# Patient Record
Sex: FEMALE | Race: WHITE | NOT HISPANIC OR LATINO | Employment: UNEMPLOYED | URBAN - METROPOLITAN AREA
[De-identification: names, ages, dates, MRNs, and addresses within clinical notes are randomized per-mention and may not be internally consistent; named-entity substitution may affect disease eponyms.]

---

## 2017-05-22 ENCOUNTER — APPOINTMENT (OUTPATIENT)
Dept: MEDICAL GROUP | Facility: PHYSICIAN GROUP | Age: 52
End: 2017-05-22
Payer: COMMERCIAL

## 2017-08-14 ENCOUNTER — HOSPITAL ENCOUNTER (OUTPATIENT)
Dept: LAB | Facility: MEDICAL CENTER | Age: 52
End: 2017-08-14
Attending: INTERNAL MEDICINE
Payer: COMMERCIAL

## 2017-08-14 DIAGNOSIS — E78.5 DYSLIPIDEMIA: ICD-10-CM

## 2017-08-14 PROCEDURE — 83695 ASSAY OF LIPOPROTEIN(A): CPT

## 2017-08-14 PROCEDURE — 36415 COLL VENOUS BLD VENIPUNCTURE: CPT

## 2017-08-15 LAB — LPA SERPL-MCNC: 78 MG/DL

## 2017-08-16 ENCOUNTER — OFFICE VISIT (OUTPATIENT)
Dept: MEDICAL GROUP | Facility: PHYSICIAN GROUP | Age: 52
End: 2017-08-16
Payer: COMMERCIAL

## 2017-08-16 VITALS
TEMPERATURE: 97.5 F | WEIGHT: 289 LBS | RESPIRATION RATE: 18 BRPM | HEART RATE: 101 BPM | SYSTOLIC BLOOD PRESSURE: 136 MMHG | BODY MASS INDEX: 49.34 KG/M2 | HEIGHT: 64 IN | DIASTOLIC BLOOD PRESSURE: 90 MMHG | OXYGEN SATURATION: 96 %

## 2017-08-16 DIAGNOSIS — E78.5 DYSLIPIDEMIA: ICD-10-CM

## 2017-08-16 DIAGNOSIS — E66.01 MORBID OBESITY WITH BMI OF 45.0-49.9, ADULT (HCC): ICD-10-CM

## 2017-08-16 DIAGNOSIS — E03.9 HYPOTHYROIDISM, UNSPECIFIED TYPE: ICD-10-CM

## 2017-08-16 DIAGNOSIS — E55.9 VITAMIN D DEFICIENCY: ICD-10-CM

## 2017-08-16 PROCEDURE — 99214 OFFICE O/P EST MOD 30 MIN: CPT | Performed by: INTERNAL MEDICINE

## 2017-08-16 NOTE — PROGRESS NOTES
"Subjective:  Antonella is a 52 y.o. female with the following   Past Medical History   Diagnosis Date   • Hypothyroidism    • Increased BMI    • Arthritis      FEET & knees   • Bronchitis 12-15-14     on antibiotic   • Pain 12-22-14     right leg, feet, 4/10      Family History   Problem Relation Age of Onset   • Cancer Mother      renal ca/ thyroid ca   • Heart Disease Father      The patient is on the following medications:   Current outpatient prescriptions:   •  levothyroxine (SYNTHROID) 175 MCG Tab, TAKE 1 TABLET BY MOUTH EVERY DAY, Disp: 90 Tab, Rfl: 3  •  Cholecalciferol (VITAMIN D3) 2000 UNIT CAPS, Take 4,000 Units by mouth every day., Disp: , Rfl:   •  cetirizine (ZYRTEC) 10 MG TABS, Take 10 mg by mouth every day., Disp: , Rfl:   •  Evening Primrose OIL, Take 1 Tab by mouth every morning., Disp: , Rfl:     HPI; patient is here today for follow-up visit, currently on levothyroxine for hypothyroidism denies having cold intolerance or constipation, on vitamin D supplements for vitamin D deficiency denies having back pain. Patient has history of morbid obesity, noncompliant with diet and exercise denies having chest pain or shortness of breath.  ROS:  See HPI    Blood pressure 136/90, pulse 101, temperature 36.4 °C (97.5 °F), resp. rate 18, height 1.626 m (5' 4.02\"), weight 131.09 kg (289 lb), SpO2 96 %.on RA  Objective:  Patient is well appearing and in no acute distress.  Pharynx is clear.  Neck is soft and supple with no cervical or supraclavicular lymphadenopathy, thyromegaly or masses, no JVD.  Lungs clear to auscultation bilaterally with normal respiratory effort. Abdomen soft, non-tender on palpation,not distended. Heart regular rate and rhythm without murmur. Extremities without any clubbing, cyanosis, or edema.    Assessment and Plan:  1. Hypothyroidism; status post thyroidectomy, on levothyroxine 175 mcg q. Daily.       2. increased BMI ; noncompliant with diet and exercise.      3. vitamin D deficiency ; " currently on vitamin D 3 -4000 units supplements.            4. Foot osteoarthritis; per patient responds to OTC nonsteroidal anti-inflammatory as needed.        5. Dyslipidemia;    Ref. Range 2/1/2014 07:57 10/7/2016 11:20 8/14/2017 08:06   Cholesterol,Tot Latest Ref Range: 100-199 mg/dL 125     Triglycerides Latest Ref Range: 0-149 mg/dL 106     HDL Latest Ref Range: >=40 mg/dL 32 (A)     LDL Latest Ref Range: <100 mg/dL 72     Lipoprotein A Latest Ref Range: <=29 mg/dL  88 (H) 78 (H)         Patient is advised on preventive and supportive care, diet and lifestyle modification, daily walking ,exercise and weight loss, alternative therapies for elevated lipoprotein A, monitor labs.                                  Please note that this dictation was created using voice recognition software. I have worked with consultants from the vendor as well as technical experts from [facility] [employer] to optimize the interface. I have made every reasonable attempt to correct obvious errors, but I expect that there are errors of grammar and possibly content that I did not discover before finalizing the note.

## 2017-08-16 NOTE — MR AVS SNAPSHOT
"        Antonella Frausto   2017 9:20 AM   Office Visit   MRN: [id number]    Department:  [facility]   Dept Phone:  [phone]    Description:  Female : 1965   Provider:  Alexa Torres M.D.           Reason for Visit     Follow-Up           Allergies as of 2017     Allergen Noted Reactions    Asa [Aspirin] 2010   Vomiting, Nausea    Codeine 2010   Vomiting, Nausea    Latex 2014       \"irritates my skin\"      You were diagnosed with     Morbid obesity with BMI of 45.0-49.9, adult (CMS-HCC)   [828827]       Hypothyroidism, unspecified type   [1143937]       Dyslipidemia   [068239]       Vitamin D deficiency   [0251167]         Vital Signs     Blood Pressure Pulse Temperature Respirations Height Weight    136/90 mmHg 101 36.4 °C (97.5 °F) 18 1.626 m (5' 4.02\") 131.09 kg (289 lb)    Body Mass Index Oxygen Saturation Smoking Status             49.58 kg/m2 96% Never Smoker          Basic Information     Date Of Birth Sex Race Ethnicity Preferred Language    1965 Female White Non- English      Problem List              ICD-10-CM Priority Class Noted - Resolved    Hypothyroidism E03.9   2012 - Present    Vitamin d deficiency    2012 - Present    Increased BMI R63.8   2012 - Present    Low HDL (under 40) E78.6   2012 - Present    Osteoarthritis of foot M19.079   7/10/2013 - Present    Other and unspecified derangement of medial meniscus M23.305   2014 - Present    Morbid obesity with BMI of 45.0-49.9, adult ([facility]) E66.01, Z68.42   2017 - Present    Vitamin D deficiency E55.9   2017 - Present      Health Maintenance        Date Due Completion Dates    IMM DTaP/Tdap/Td Vaccine (1 - Tdap) 1982 ---    COLONOSCOPY 2013 ---    MAMMOGRAM 2015, 9/10/2012, 2010, 2010    PAP SMEAR 2017    IMM INFLUENZA (1) 2017 ---            Current Immunizations     No immunizations on file.      Below and/or " attached are the medications your provider expects you to take. Review all of your home medications and newly ordered medications with your provider and/or pharmacist. Follow medication instructions as directed by your provider and/or pharmacist. Please keep your medication list with you and share with your provider. Update the information when medications are discontinued, doses are changed, or new medications (including over-the-counter products) are added; and carry medication information at all times in the event of emergency situations     Allergies:  ASA - Vomiting,Nausea     CODEINE - Vomiting,Nausea     LATEX - (reactions not documented)               Medications  Valid as of: August 16, 2017 -  9:54 AM    Generic Name Brand Name Tablet Size Instructions for use    Cetirizine HCl (Tab) ZYRTEC 10 MG Take 10 mg by mouth every day.        Cholecalciferol (Cap) Vitamin D3 2000 UNIT Take 4,000 Units by mouth every day.        Evening Primrose (Oil) Evening Primrose  Take 1 Tab by mouth every morning.        Levothyroxine Sodium (Tab) SYNTHROID 175 MCG TAKE 1 TABLET BY MOUTH EVERY DAY        .                 Medicines prescribed today were sent to:     [facility] [address] [place] [address] [address] AT [facility] [employer] On license of [facility]. & [address]    [address] [place] [address]    Phone: [phone] Fax: [phone]    Open 24 Hours?: No      Medication refill instructions:       If your prescription bottle indicates you have medication refills left, it is not necessary to call your provider’s office. Please contact your pharmacy and they will refill your medication.    If your prescription bottle indicates you do not have any refills left, you may request refills at any time through one of the following ways: The online hi5 system (except Urgent Care), by calling your provider’s office, or by asking your pharmacy to contact your provider’s office with a refill request. Medication refills are  processed only during regular business hours and may not be available until the next business day. Your provider may request additional information or to have a follow-up visit with you prior to refilling your medication.   *Please Note: Medication refills are assigned a new Rx number when refilled electronically. Your pharmacy may indicate that no refills were authorized even though a new prescription for the same medication is available at the pharmacy. Please request the medicine by name with the pharmacy before contacting your provider for a refill.        Your To Do List     Future Labs/Procedures Complete By Expires    LIPOPROTEIN A  As directed 8/16/2018    VITAMIN D,25 HYDROXY  As directed 8/16/2018         [employer] Access Code: Activation code not generated  Current [employer] Status: Active

## 2017-10-16 DIAGNOSIS — E03.9 HYPOTHYROIDISM: ICD-10-CM

## 2017-10-16 RX ORDER — LEVOTHYROXINE SODIUM 175 UG/1
TABLET ORAL
Qty: 90 TAB | Refills: 0 | Status: SHIPPED | OUTPATIENT
Start: 2017-10-16 | End: 2018-02-16

## 2017-10-16 NOTE — TELEPHONE ENCOUNTER
Was the patient seen in the last year in this department? Yes     Does patient have an active prescription for medications requested? NO     Received Request Via: Pharmacy

## 2018-02-12 ENCOUNTER — APPOINTMENT (OUTPATIENT)
Dept: MEDICAL GROUP | Facility: PHYSICIAN GROUP | Age: 53
End: 2018-02-12
Payer: COMMERCIAL

## 2018-02-16 ENCOUNTER — OFFICE VISIT (OUTPATIENT)
Dept: MEDICAL GROUP | Facility: PHYSICIAN GROUP | Age: 53
End: 2018-02-16
Payer: COMMERCIAL

## 2018-02-16 VITALS
HEIGHT: 64 IN | WEIGHT: 293 LBS | DIASTOLIC BLOOD PRESSURE: 90 MMHG | SYSTOLIC BLOOD PRESSURE: 116 MMHG | HEART RATE: 82 BPM | BODY MASS INDEX: 50.02 KG/M2 | RESPIRATION RATE: 16 BRPM | OXYGEN SATURATION: 96 % | TEMPERATURE: 97.7 F

## 2018-02-16 DIAGNOSIS — E66.01 MORBID OBESITY WITH BMI OF 50.0-59.9, ADULT (HCC): ICD-10-CM

## 2018-02-16 DIAGNOSIS — E89.0 POSTOPERATIVE HYPOTHYROIDISM: ICD-10-CM

## 2018-02-16 DIAGNOSIS — E55.9 VITAMIN D DEFICIENCY: ICD-10-CM

## 2018-02-16 PROCEDURE — 99214 OFFICE O/P EST MOD 30 MIN: CPT | Performed by: NURSE PRACTITIONER

## 2018-02-16 RX ORDER — LEVOTHYROXINE SODIUM 175 UG/1
175 TABLET ORAL
Qty: 90 TAB | Refills: 0 | Status: SHIPPED | OUTPATIENT
Start: 2018-02-16 | End: 2018-04-23 | Stop reason: SDUPTHER

## 2018-02-16 ASSESSMENT — PAIN SCALES - GENERAL: PAINLEVEL: NO PAIN

## 2018-02-16 ASSESSMENT — PATIENT HEALTH QUESTIONNAIRE - PHQ9: CLINICAL INTERPRETATION OF PHQ2 SCORE: 0

## 2018-02-16 NOTE — ASSESSMENT & PLAN NOTE
"Chronic in nature. Stable per patient. Patient states that her last TSH was \"recently\". Last recorded TSH was completed in 2016 repeat lab is ordered for patient at this time. Labwork due. Denies palpitations, skin changes, temperature intolerance, changes in bowel habits.    "

## 2018-02-16 NOTE — ASSESSMENT & PLAN NOTE
Chronic in nature. Stable. Discussed with patient that she should talk to her new provider regarding this issue, continue supplementation at this time.

## 2018-02-26 ENCOUNTER — HOSPITAL ENCOUNTER (OUTPATIENT)
Dept: RADIOLOGY | Facility: MEDICAL CENTER | Age: 53
End: 2018-02-26
Attending: FAMILY MEDICINE
Payer: COMMERCIAL

## 2018-02-26 DIAGNOSIS — Z12.31 VISIT FOR SCREENING MAMMOGRAM: ICD-10-CM

## 2018-02-26 PROCEDURE — 77067 SCR MAMMO BI INCL CAD: CPT

## 2018-04-19 ENCOUNTER — HOSPITAL ENCOUNTER (OUTPATIENT)
Dept: LAB | Facility: MEDICAL CENTER | Age: 53
End: 2018-04-19
Attending: NURSE PRACTITIONER
Payer: COMMERCIAL

## 2018-04-19 LAB
T4 FREE SERPL-MCNC: 0.97 NG/DL (ref 0.53–1.43)
TSH SERPL DL<=0.005 MIU/L-ACNC: 3.03 UIU/ML (ref 0.38–5.33)

## 2018-04-19 PROCEDURE — 84439 ASSAY OF FREE THYROXINE: CPT

## 2018-04-19 PROCEDURE — 36415 COLL VENOUS BLD VENIPUNCTURE: CPT

## 2018-04-19 PROCEDURE — 84443 ASSAY THYROID STIM HORMONE: CPT

## 2018-04-23 ENCOUNTER — OFFICE VISIT (OUTPATIENT)
Dept: MEDICAL GROUP | Facility: PHYSICIAN GROUP | Age: 53
End: 2018-04-23
Payer: COMMERCIAL

## 2018-04-23 VITALS
BODY MASS INDEX: 50.02 KG/M2 | OXYGEN SATURATION: 97 % | WEIGHT: 293 LBS | HEIGHT: 64 IN | TEMPERATURE: 97.5 F | DIASTOLIC BLOOD PRESSURE: 84 MMHG | HEART RATE: 85 BPM | SYSTOLIC BLOOD PRESSURE: 124 MMHG

## 2018-04-23 DIAGNOSIS — Z00.00 HEALTHCARE MAINTENANCE: ICD-10-CM

## 2018-04-23 DIAGNOSIS — E55.9 VITAMIN D DEFICIENCY: ICD-10-CM

## 2018-04-23 DIAGNOSIS — Z12.11 SCREEN FOR COLON CANCER: ICD-10-CM

## 2018-04-23 DIAGNOSIS — E89.0 POSTOPERATIVE HYPOTHYROIDISM: ICD-10-CM

## 2018-04-23 DIAGNOSIS — J30.89 CHRONIC NON-SEASONAL ALLERGIC RHINITIS, UNSPECIFIED TRIGGER: ICD-10-CM

## 2018-04-23 PROBLEM — J30.9 CHRONIC ALLERGIC RHINITIS: Status: ACTIVE | Noted: 2018-04-23

## 2018-04-23 PROCEDURE — 99214 OFFICE O/P EST MOD 30 MIN: CPT | Performed by: FAMILY MEDICINE

## 2018-04-23 RX ORDER — LEVOTHYROXINE SODIUM 175 UG/1
175 TABLET ORAL
Qty: 90 TAB | Refills: 3 | Status: SHIPPED | OUTPATIENT
Start: 2018-04-23 | End: 2019-07-30 | Stop reason: SDUPTHER

## 2018-04-23 ASSESSMENT — PAIN SCALES - GENERAL: PAINLEVEL: NO PAIN

## 2018-04-23 NOTE — ASSESSMENT & PLAN NOTE
Patient is s/p thyroidectomy several years back.She is currently on levothyroxine 175 mcg daily.Currently asymptomatic.Recent TSH was normal.

## 2018-04-23 NOTE — PROGRESS NOTES
"Subjective:   Antonella Frausto is a 52 y.o. female here today for establishing care and discuss chronic problems as below :    HPI :    Chronic allergic rhinitis  Patient manages by taking Zyrtec 10 mg daily.Symptoms are generally well controlled.    Hypothyroidism  Patient is s/p thyroidectomy several years back.She is currently on levothyroxine 175 mcg daily.Currently asymptomatic.Recent TSH was normal.    Vitamin D deficiency  Takes Vitamin D 4000 units daily.Last Vitamin D level on chart normal.       Current medicines (including changes today)  Current Outpatient Prescriptions   Medication Sig Dispense Refill   • levothyroxine (SYNTHROID) 175 MCG Tab Take 1 Tab by mouth every day. 90 Tab 3   • Cholecalciferol (VITAMIN D3) 2000 UNIT CAPS Take 4,000 Units by mouth every day.     • cetirizine (ZYRTEC) 10 MG TABS Take 10 mg by mouth every day.     • Evening Primrose OIL Take 1 Tab by mouth every morning.       No current facility-administered medications for this visit.      She  has a past medical history of Arthritis; Bronchitis (12-15-14); Hypothyroidism; Increased BMI; and Pain (12-22-14).    ROS   No chest pain, no shortness of breath, no abdominal pain       Objective:     Blood pressure 124/84, pulse 85, temperature 36.4 °C (97.5 °F), height 1.626 m (5' 4\"), weight (!) 135.2 kg (298 lb), SpO2 97 %. Body mass index is 51.15 kg/m².     PHYSICAL EXAM     GEN: Alert and oriented,well appearing, no acute distress  SKIN: warm, dry to touch, no rashes or lesions in visible areas  PSYCH: mood and affect normal, judgement normal  EYES: Conjunctiva clear, lids normal,pupils equal round and reactive  ENMT: Normal external nose and ears,EACs normal appearing, TM pearly gray with normal light reflex bilaterally,nasal mucosa and turbinates normal appearing without erythema or edema, lips without lesions,good dentition,oropharynx clear  Neck : Trachea midline, no masses or swelling  LYMPHATIC : No cervical or " supraclavicular lymphadenopathy  RESPIRATORY : Unlabored respiratory effort, no distress noted, clear to auscultation bilaterally, no wheeze, rhonchi, crackles  CARDIOVASCULAR: RRR, S1 S2 normal, no murmurs,no edema of the extremities  MUSCULOSKELETAL : Normal gait and stance, no obvious abnormalities   NEURO: No overt focal neurologic deficits,sensation intact        Assessment and Plan:   The following treatment plan was discussed    1. Postoperative hypothyroidism  New problem to me.Continue levothyroxine at current dose.Last TSH normal.Labs can be repeated in 1 year  - levothyroxine (SYNTHROID) 175 MCG Tab; Take 1 Tab by mouth every day.  Dispense: 90 Tab; Refill: 3    2. Chronic non-seasonal allergic rhinitis, unspecified trigger  Chronic.Well controlled on daily Zyrtec.    3. Vitamin D deficiency  Continue Vitamin D 4000 units daily.Lab ordered for checking Vitamin D level    4. Healthcare maintenance  - LIPID PROFILE; Future  - COMP METABOLIC PANEL; Future  - HEMOGLOBIN A1C; Future    5. Screen for colon cancer  - REFERRAL TO GI FOR COLONOSCOPY      Followup: Return in about 6 weeks (around 6/4/2018) for Pap, GYN Exam.         Please note that this dictation was created using voice recognition software. I have made every reasonable attempt to correct obvious errors, but I expect that there are errors of grammar and possibly content that I did not discover before finalizing the note.

## 2018-05-14 DIAGNOSIS — E89.0 POSTOPERATIVE HYPOTHYROIDISM: ICD-10-CM

## 2018-05-14 RX ORDER — LEVOTHYROXINE SODIUM 175 UG/1
175 TABLET ORAL
Qty: 90 TAB | Refills: 0 | OUTPATIENT
Start: 2018-05-14

## 2018-05-14 RX ORDER — LEVOTHYROXINE SODIUM 175 UG/1
TABLET ORAL
Refills: 0 | OUTPATIENT
Start: 2018-05-14

## 2018-05-23 ENCOUNTER — HOSPITAL ENCOUNTER (OUTPATIENT)
Dept: LAB | Facility: MEDICAL CENTER | Age: 53
End: 2018-05-23
Attending: FAMILY MEDICINE
Payer: COMMERCIAL

## 2018-05-23 DIAGNOSIS — E55.9 VITAMIN D DEFICIENCY: ICD-10-CM

## 2018-05-23 DIAGNOSIS — Z00.00 HEALTHCARE MAINTENANCE: ICD-10-CM

## 2018-05-23 LAB
25(OH)D3 SERPL-MCNC: 29 NG/ML (ref 30–100)
ALBUMIN SERPL BCP-MCNC: 3.7 G/DL (ref 3.2–4.9)
ALBUMIN/GLOB SERPL: 1.2 G/DL
ALP SERPL-CCNC: 73 U/L (ref 30–99)
ALT SERPL-CCNC: 16 U/L (ref 2–50)
ANION GAP SERPL CALC-SCNC: 9 MMOL/L (ref 0–11.9)
AST SERPL-CCNC: 14 U/L (ref 12–45)
BILIRUB SERPL-MCNC: 0.3 MG/DL (ref 0.1–1.5)
BUN SERPL-MCNC: 18 MG/DL (ref 8–22)
CALCIUM SERPL-MCNC: 9.1 MG/DL (ref 8.5–10.5)
CHLORIDE SERPL-SCNC: 106 MMOL/L (ref 96–112)
CHOLEST SERPL-MCNC: 148 MG/DL (ref 100–199)
CO2 SERPL-SCNC: 25 MMOL/L (ref 20–33)
CREAT SERPL-MCNC: 0.7 MG/DL (ref 0.5–1.4)
EST. AVERAGE GLUCOSE BLD GHB EST-MCNC: 123 MG/DL
GLOBULIN SER CALC-MCNC: 3 G/DL (ref 1.9–3.5)
GLUCOSE SERPL-MCNC: 106 MG/DL (ref 65–99)
HBA1C MFR BLD: 5.9 % (ref 0–5.6)
HDLC SERPL-MCNC: 37 MG/DL
LDLC SERPL CALC-MCNC: 93 MG/DL
POTASSIUM SERPL-SCNC: 4.3 MMOL/L (ref 3.6–5.5)
PROT SERPL-MCNC: 6.7 G/DL (ref 6–8.2)
SODIUM SERPL-SCNC: 140 MMOL/L (ref 135–145)
TRIGL SERPL-MCNC: 92 MG/DL (ref 0–149)

## 2018-05-23 PROCEDURE — 83036 HEMOGLOBIN GLYCOSYLATED A1C: CPT

## 2018-05-23 PROCEDURE — 80061 LIPID PANEL: CPT

## 2018-05-23 PROCEDURE — 80053 COMPREHEN METABOLIC PANEL: CPT

## 2018-05-23 PROCEDURE — 82306 VITAMIN D 25 HYDROXY: CPT

## 2018-05-23 PROCEDURE — 36415 COLL VENOUS BLD VENIPUNCTURE: CPT

## 2018-05-24 ENCOUNTER — OFFICE VISIT (OUTPATIENT)
Dept: MEDICAL GROUP | Facility: PHYSICIAN GROUP | Age: 53
End: 2018-05-24
Payer: COMMERCIAL

## 2018-05-24 ENCOUNTER — HOSPITAL ENCOUNTER (OUTPATIENT)
Facility: MEDICAL CENTER | Age: 53
End: 2018-05-24
Attending: FAMILY MEDICINE
Payer: COMMERCIAL

## 2018-05-24 VITALS
DIASTOLIC BLOOD PRESSURE: 76 MMHG | OXYGEN SATURATION: 98 % | SYSTOLIC BLOOD PRESSURE: 122 MMHG | BODY MASS INDEX: 48.32 KG/M2 | WEIGHT: 283 LBS | TEMPERATURE: 97.2 F | HEART RATE: 86 BPM | HEIGHT: 64 IN

## 2018-05-24 DIAGNOSIS — Z11.51 SCREENING FOR HPV (HUMAN PAPILLOMAVIRUS): ICD-10-CM

## 2018-05-24 DIAGNOSIS — Z01.419 ENCOUNTER FOR WELL WOMAN EXAM WITH ROUTINE GYNECOLOGICAL EXAM: ICD-10-CM

## 2018-05-24 DIAGNOSIS — Z12.4 CERVICAL CANCER SCREENING: ICD-10-CM

## 2018-05-24 PROCEDURE — 99396 PREV VISIT EST AGE 40-64: CPT | Performed by: FAMILY MEDICINE

## 2018-05-24 PROCEDURE — 88175 CYTOPATH C/V AUTO FLUID REDO: CPT

## 2018-05-24 PROCEDURE — 87624 HPV HI-RISK TYP POOLED RSLT: CPT

## 2018-05-24 ASSESSMENT — PAIN SCALES - GENERAL: PAINLEVEL: NO PAIN

## 2018-05-24 NOTE — PROGRESS NOTES
SUBJECTIVE: 52 y.o. female for annual routine gynecologic exam    Gynecological exam      Obstetric History       T3      L0     SAB0   TAB0   Ectopic0   Molar0   Multiple0   Live Births0       Last Pap: more than 3 years ago  History   Sexual Activity   • Sexual activity: Yes   • Partners: Male     Sexual history: currently sexually active   H/O Abnormal Pap no  She  reports that she has never smoked. She has never used smokeless tobacco.        Allergies: Asa [aspirin]; Codeine; and Latex     ROS:    Reports mild-moderate menopause symptoms of hot flashes (takes evening primrose oil), night sweats, sleep disruption, mood changes.Denies vaginal dryness.   No significant bloating/fluid retention, pelvic pain, or dyspareunia. No vaginal discharge   No breast tenderness, mass, nipple discharge, changes in size or contour, or abnormal cyclic discomfort.  No urinary tract symptoms, no incontinence, no polydipsia, polyuria,  No abdominal pain, change in bowel habits, black or bloody stools.    No unusual headaches, no visual changes, menstrual migraines   No prolonged cough. No dyspnea or chest pain on exertion.  No depression, labile mood, anxiety, libido changes, insomnia.  No temperature intolerance.  No new/concerning skin lesions, concerns.     Exercise: no regular exercise program  Preventive Care:mammogram completed, will schedule colonoscopy    Current medicines (including changes today)  Current Outpatient Prescriptions   Medication Sig Dispense Refill   • levothyroxine (SYNTHROID) 175 MCG Tab Take 1 Tab by mouth every day. 90 Tab 3   • Cholecalciferol (VITAMIN D3) 2000 UNIT CAPS Take 4,000 Units by mouth every day.     • cetirizine (ZYRTEC) 10 MG TABS Take 10 mg by mouth every day.     • Evening Primrose OIL Take 1 Tab by mouth every morning.       No current facility-administered medications for this visit.      She  has a past medical history of Arthritis; Bronchitis (12-15-14); Hypothyroidism;  "Increased BMI; and Pain (12-22-14).  She  has a past surgical history that includes cholecystectomy (1995); gyn surgery; thyroidectomy total (10/20/2010); node biopsy (10/20/2010); knee arthroscopy (12/29/2014); medial meniscectomy (12/29/2014); and chondroplasty (12/29/2014).     Family History:   Family History   Problem Relation Age of Onset   • Cancer Mother      Thyroid ca, lymphoma   • Heart Disease Father        OBJECTIVE:   /76   Pulse 86   Temp 36.2 °C (97.2 °F)   Ht 1.626 m (5' 4\")   Wt (!) 128.4 kg (283 lb)   SpO2 98%   BMI 48.58 kg/m²   Body mass index is 48.58 kg/m².    HEAD AND NECK:  Ears normal.  Throat, oral cavity and tongue normal.  Neck supple. No adenopathy or masses in the neck or supraclavicular regions.  No carotid bruits. No thyromegaly. NEURO: Cranial nerves are normal. DTR's normal and symmetric.  CHEST:  Clear, good air entry, no wheezes or rales. HEART:  Regular rate and rhythm.  S1 and S2 normal.   No edema or JVD. ABDOMEN:  Soft without tenderness, guarding, mass or organomegaly.  No CVA tenderness or inguinal adenopathy. EXTREMITIES:  Extremities, reflexes and peripheral pulses are normal. SKIN: color normal, vascularity normal, no edema, temperature normal   No rashes or suspicious skin lesions noted.     Breast Exam: Performed with instruction during examination. No axillary lymphadenopathy, no skin changes, no dominant masses. No nipple retraction  Pelvic Exam -  Normal external genitalia with no lesions. Vaginal Mucosa:  normal vaginal mucosa . Cervix with no visible lesions. No cervical motion tenderness. Uterus is normal sized with no masses. No adnexal tenderness or enlargement appreciated. Thin Prep Pap is obtained and specimen(s) sent to lab      <ASSESSMENT and PLAN>  1. Encounter for well woman exam with routine gynecological exam  THINPREP PAP WITH HPV   2. Cervical cancer screening  THINPREP PAP WITH HPV   3. Screening for HPV (human papillomavirus)  THINPREP " PAP WITH HPV       Discussed  mammography screening, adequate intake of calcium and vitamin D, diet and exercise   Follow-up in 1 years for next Gyn exam and 5 years for next Pap.   Next office visit for recheck of chronic medical conditions is due in  1 year      Please note that this dictation was created using voice recognition software. I have made every reasonable attempt to correct obvious errors, but I expect that there are errors of grammar and possibly content that I did not discover before finalizing the note.

## 2018-05-25 DIAGNOSIS — Z01.419 ENCOUNTER FOR WELL WOMAN EXAM WITH ROUTINE GYNECOLOGICAL EXAM: ICD-10-CM

## 2018-05-25 DIAGNOSIS — Z11.51 SCREENING FOR HPV (HUMAN PAPILLOMAVIRUS): ICD-10-CM

## 2018-05-25 DIAGNOSIS — Z12.4 CERVICAL CANCER SCREENING: ICD-10-CM

## 2018-05-26 LAB
CYTOLOGY REG CYTOL: NORMAL
HPV HR 12 DNA CVX QL NAA+PROBE: NEGATIVE
HPV16 DNA SPEC QL NAA+PROBE: NEGATIVE
HPV18 DNA SPEC QL NAA+PROBE: NEGATIVE
SPECIMEN SOURCE: NORMAL

## 2018-07-30 ENCOUNTER — HOSPITAL ENCOUNTER (OUTPATIENT)
Dept: RADIOLOGY | Facility: MEDICAL CENTER | Age: 53
End: 2018-07-30
Attending: FAMILY MEDICINE
Payer: COMMERCIAL

## 2018-07-30 ENCOUNTER — OFFICE VISIT (OUTPATIENT)
Dept: URGENT CARE | Facility: PHYSICIAN GROUP | Age: 53
End: 2018-07-30
Payer: COMMERCIAL

## 2018-07-30 VITALS
RESPIRATION RATE: 16 BRPM | BODY MASS INDEX: 50.02 KG/M2 | HEIGHT: 64 IN | TEMPERATURE: 96.9 F | HEART RATE: 108 BPM | WEIGHT: 293 LBS | DIASTOLIC BLOOD PRESSURE: 100 MMHG | OXYGEN SATURATION: 94 % | SYSTOLIC BLOOD PRESSURE: 136 MMHG

## 2018-07-30 DIAGNOSIS — I83.90 VARICOSE VEIN OF LEG: ICD-10-CM

## 2018-07-30 DIAGNOSIS — I83.819 VARICOSE VEINS WITH PAIN: ICD-10-CM

## 2018-07-30 DIAGNOSIS — I82.890 SUPERFICIAL VEIN THROMBOSIS: ICD-10-CM

## 2018-07-30 PROCEDURE — 93971 EXTREMITY STUDY: CPT | Mod: RT

## 2018-07-30 PROCEDURE — 99214 OFFICE O/P EST MOD 30 MIN: CPT | Performed by: FAMILY MEDICINE

## 2018-07-30 ASSESSMENT — ENCOUNTER SYMPTOMS
NUMBNESS: 0
WEAKNESS: 0
LEG PAIN: 1
FEVER: 0
JOINT SWELLING: 0

## 2018-07-30 ASSESSMENT — PAIN SCALES - GENERAL: PAINLEVEL: 2=MINIMAL-SLIGHT

## 2018-07-30 NOTE — PROGRESS NOTES
"Subjective:   Antonella Frausto is a 53 y.o. female who presents for Leg Pain (R leg x1day )        Leg Pain   This is a new problem. The current episode started today. The problem occurs constantly. The problem has been unchanged. Pertinent negatives include no fever, joint swelling, numbness or weakness.     Review of Systems   Constitutional: Negative for fever.   Musculoskeletal: Negative for joint swelling.   Neurological: Negative for weakness and numbness.     Allergies   Allergen Reactions   • Asa [Aspirin] Vomiting and Nausea   • Codeine Vomiting and Nausea   • Latex      \"irritates my skin\"      Objective:   /100   Pulse (!) 108   Temp 36.1 °C (96.9 °F)   Resp 16   Ht 1.626 m (5' 4\")   Wt (!) 135.6 kg (299 lb)   SpO2 94%   BMI 51.32 kg/m²   Physical Exam   Constitutional: She is oriented to person, place, and time. She appears well-developed and well-nourished. No distress.   HENT:   Head: Normocephalic and atraumatic.   Eyes: Pupils are equal, round, and reactive to light. Conjunctivae and EOM are normal.   Cardiovascular: Normal rate and regular rhythm.    No murmur heard.  Pulmonary/Chest: Effort normal and breath sounds normal. No respiratory distress.   Abdominal: Soft. She exhibits no distension. There is no tenderness.   Neurological: She is alert and oriented to person, place, and time. She has normal reflexes. No sensory deficit.   Skin: Skin is warm and dry.        Psychiatric: She has a normal mood and affect.         Assessment/Plan:   Assessment    1. Superficial vein thrombosis  - US-EXTREMITY VENOUS UNILATERAL-LOWER RIGHT; Future  - REFERRAL TO VASCULAR MEDICINE Reason for Referral? Established Vascular Disease    2. Varicose vein of leg  ASA daily  Differential diagnosis, natural history, supportive care, and indications for immediate follow-up discussed.  "

## 2018-08-02 ENCOUNTER — OFFICE VISIT (OUTPATIENT)
Dept: URGENT CARE | Facility: PHYSICIAN GROUP | Age: 53
End: 2018-08-02
Payer: COMMERCIAL

## 2018-08-02 VITALS
RESPIRATION RATE: 14 BRPM | HEIGHT: 64 IN | HEART RATE: 94 BPM | WEIGHT: 293 LBS | BODY MASS INDEX: 50.02 KG/M2 | TEMPERATURE: 97.7 F | SYSTOLIC BLOOD PRESSURE: 140 MMHG | OXYGEN SATURATION: 97 % | DIASTOLIC BLOOD PRESSURE: 100 MMHG

## 2018-08-02 DIAGNOSIS — E66.01 MORBID OBESITY WITH BMI OF 50.0-59.9, ADULT (HCC): ICD-10-CM

## 2018-08-02 DIAGNOSIS — I83.90 VARICOSE VEIN OF LEG: ICD-10-CM

## 2018-08-02 DIAGNOSIS — I82.890 SUPERFICIAL VEIN THROMBOSIS: ICD-10-CM

## 2018-08-02 PROCEDURE — 99213 OFFICE O/P EST LOW 20 MIN: CPT | Performed by: NURSE PRACTITIONER

## 2018-08-02 ASSESSMENT — ENCOUNTER SYMPTOMS
MYALGIAS: 0
JOINT SWELLING: 0
SHORTNESS OF BREATH: 0
NUMBNESS: 0
NAUSEA: 0
ARTHRALGIAS: 0
LEG SWELLING: 1
DIZZINESS: 0
WEAKNESS: 0
VOMITING: 0
CHILLS: 0
ANOREXIA: 0
DIAPHORESIS: 0
EYE PAIN: 0
FEVER: 0
SORE THROAT: 0

## 2018-08-02 NOTE — PROGRESS NOTES
"Subjective:   Antonella Frausto is a 53 y.o. female who presents for Leg Swelling (r leg clots increasing )  Patient returns to clinic today with concerns of her right leg swelling more since her visit on 7/30. Patient was seen and evaluated an ultrasound was completed indicating no DVT however superficial thrombosis was noted in the greater saphenous vein proximal to which was less than 5 cm. Patient does have a scheduled appointment August 14 with vascular however was concerned because she was developing more superficial thrombosis on her right leg. Patient has been taking a baby aspirin and is looking for reassurance that this is adequate treatment. Patient denies any leg cramping., Chest pain, shortness of breath.       Leg Swelling   This is a recurrent problem. The current episode started in the past 7 days. The problem occurs constantly. The problem has been unchanged. Pertinent negatives include no anorexia, arthralgias, chest pain, chills, diaphoresis, fever, joint swelling, myalgias, nausea, numbness, rash, sore throat, vomiting or weakness. Nothing aggravates the symptoms. Treatments tried: ASA. The treatment provided mild relief.     Review of Systems   Constitutional: Negative for chills, diaphoresis and fever.   HENT: Negative for sore throat.    Eyes: Negative for pain.   Respiratory: Negative for shortness of breath.    Cardiovascular: Negative for chest pain.   Gastrointestinal: Negative for anorexia, nausea and vomiting.   Genitourinary: Negative for hematuria.   Musculoskeletal: Negative for arthralgias, joint swelling and myalgias.   Skin: Negative for rash.   Neurological: Negative for dizziness, weakness and numbness.     Allergies   Allergen Reactions   • Asa [Aspirin] Vomiting and Nausea   • Codeine Vomiting and Nausea   • Latex      \"irritates my skin\"      Objective:   /100   Pulse 94   Temp 36.5 °C (97.7 °F)   Resp 14   Ht 1.626 m (5' 4\")   Wt (!) 135.6 kg (299 lb)   SpO2 97%  "  BMI 51.32 kg/m²   Physical Exam   Constitutional: She is oriented to person, place, and time. She appears well-developed and well-nourished. No distress.   HENT:   Head: Normocephalic and atraumatic.   Eyes: Pupils are equal, round, and reactive to light. Conjunctivae and EOM are normal.   Cardiovascular: Normal rate and regular rhythm.    No murmur heard.  Pulses:       Femoral pulses are 2+ on the right side, and 2+ on the left side.       Posterior tibial pulses are 2+ on the right side, and 2+ on the left side.   Pulmonary/Chest: Effort normal and breath sounds normal. No respiratory distress.   Abdominal: Soft. She exhibits no distension. There is no tenderness.   Musculoskeletal:        Right lower leg: She exhibits swelling.        Legs:  Neurological: She is alert and oriented to person, place, and time. She has normal reflexes. No sensory deficit.   Skin: Skin is warm and dry.   Psychiatric: She has a normal mood and affect.         Assessment/Plan:   Assessment    1. Superficial vein thrombosis  2. Varicose vein of leg  3. Morbid obesity with BMI of 50.0-59.9, adult (HCC)        No acute findings on physical exam. Reassured patient of superficial thrombosis of the right lower extremity. Advised patient to follow-up vascular surgery as scheduled. Continue taking baby aspirin. Did recommend wearing compression stockings on bilateral lower extremities and elevating lower extremities when at rest.    Patient's body mass index is 51.32 kg/m². Exercise and nutrition counseling were performed at this visit.      Patient given precautionary s/sx that mandate immediate follow up and evaluation in the ED. Advised of risks of not doing so.    DDX, Supportive care, and indications for immediate follow-up discussed with patient.    Instructed to return to clinic or nearest emergency department if we are not available for any change in condition, further concerns, or worsening of symptoms.    The patient demonstrated  a good understanding and agreed with the treatment plan.

## 2018-08-14 ENCOUNTER — OFFICE VISIT (OUTPATIENT)
Dept: VASCULAR LAB | Facility: MEDICAL CENTER | Age: 53
End: 2018-08-14
Attending: INTERNAL MEDICINE
Payer: COMMERCIAL

## 2018-08-14 ENCOUNTER — HOSPITAL ENCOUNTER (OUTPATIENT)
Dept: RADIOLOGY | Facility: MEDICAL CENTER | Age: 53
End: 2018-08-14
Attending: NURSE PRACTITIONER
Payer: COMMERCIAL

## 2018-08-14 VITALS
WEIGHT: 293 LBS | SYSTOLIC BLOOD PRESSURE: 125 MMHG | DIASTOLIC BLOOD PRESSURE: 73 MMHG | HEIGHT: 64 IN | HEART RATE: 103 BPM | BODY MASS INDEX: 50.02 KG/M2

## 2018-08-14 DIAGNOSIS — M79.604 PAIN OF RIGHT LOWER EXTREMITY: ICD-10-CM

## 2018-08-14 PROCEDURE — 99202 OFFICE O/P NEW SF 15 MIN: CPT | Performed by: NURSE PRACTITIONER

## 2018-08-14 PROCEDURE — 93971 EXTREMITY STUDY: CPT

## 2018-08-14 PROCEDURE — 99212 OFFICE O/P EST SF 10 MIN: CPT | Performed by: NURSE PRACTITIONER

## 2018-08-14 RX ORDER — ASPIRIN 81 MG/1
81 TABLET, CHEWABLE ORAL DAILY
COMMUNITY
End: 2018-08-20

## 2018-08-14 ASSESSMENT — ENCOUNTER SYMPTOMS
DIZZINESS: 0
CONSTIPATION: 1
MEMORY LOSS: 0
CHILLS: 0
COUGH: 0
SINUS PAIN: 0
SHORTNESS OF BREATH: 1
HEADACHES: 0
HALLUCINATIONS: 0
BLOOD IN STOOL: 0
SEIZURES: 0
TINGLING: 0
NECK PAIN: 0
ABDOMINAL PAIN: 0
DIARRHEA: 0
BLURRED VISION: 0
BACK PAIN: 0
DOUBLE VISION: 0
FEVER: 0
BRUISES/BLEEDS EASILY: 1
WEAKNESS: 0
PALPITATIONS: 0
FALLS: 0
DEPRESSION: 0
LOSS OF CONSCIOUSNESS: 0
TREMORS: 0

## 2018-08-14 NOTE — PROGRESS NOTES
VASCULAR ANTI-COAGULATION VISIT  Subjective:   Antonella Frausto is a 53 y.o. female who presents today 8/14/2018 for Rt leg superficial thrombosis  Chief Complaint   Patient presents with   • New Patient       HPI: Pt was seen by her PCP on 8/2 after a diagnosis of superficial vein thrombosis. Pt is taking ASA 81 mg. The clot is located in the right greater saphenous vein in the proximal calf region and is less than 5 CM in length. Last radiology report was on 7/31. Since that duplex, the pt has experienced increased swelling and pain and in a region proximal to the original site.  Family history is positive for thrombosis brother with unknown area of clot and mother with PE after chemo.      Social History   Substance Use Topics   • Smoking status: Never Smoker   • Smokeless tobacco: Never Used   • Alcohol use No     DIET AND EXERCISE:  Weight Change:None   Diet: common adult  Exercise: no regular exercise program     Review of Systems   Constitutional: Negative for chills, fever and malaise/fatigue.   HENT: Positive for tinnitus. Negative for hearing loss, nosebleeds and sinus pain.    Eyes: Negative for blurred vision and double vision.   Respiratory: Positive for shortness of breath. Negative for cough.         Long term SOB   Cardiovascular: Positive for leg swelling. Negative for chest pain and palpitations.   Gastrointestinal: Positive for constipation. Negative for abdominal pain, blood in stool, diarrhea and melena.   Genitourinary: Positive for frequency and urgency. Negative for hematuria.   Musculoskeletal: Negative for back pain, falls and neck pain.   Neurological: Negative for dizziness, tingling, tremors, seizures, loss of consciousness, weakness and headaches.   Endo/Heme/Allergies: Bruises/bleeds easily.   Psychiatric/Behavioral: Negative for depression, hallucinations and memory loss.      Objective:     Vitals:    08/14/18 1439 08/14/18 1446   BP: 136/80 125/73   Pulse: 100 (!) 103   Weight: (!)  "134.3 kg (296 lb)    Height: 1.626 m (5' 4\")      Body mass index is 50.81 kg/m².     Physical Exam   Constitutional: She is oriented to person, place, and time. She appears well-developed and well-nourished. No distress.   Cardiovascular: Normal rate, regular rhythm and normal heart sounds.  Exam reveals no gallop and no friction rub.    No murmur heard.  Pulmonary/Chest: No respiratory distress. She has no wheezes. She has no rales.   Musculoskeletal: She exhibits edema and tenderness.   Neurological: She is alert and oriented to person, place, and time. Coordination normal.   Skin: Skin is warm and dry. She is not diaphoretic.     Lab Results   Component Value Date    CHOLSTRLTOT 148 05/23/2018    LDL 93 05/23/2018    HDL 37 (A) 05/23/2018    TRIGLYCERIDE 92 05/23/2018         Lab Results   Component Value Date    HBA1C 5.9 (H) 05/23/2018      Lab Results   Component Value Date    SODIUM 140 05/23/2018    POTASSIUM 4.3 05/23/2018    CHLORIDE 106 05/23/2018    CO2 25 05/23/2018    GLUCOSE 106 (H) 05/23/2018    BUN 18 05/23/2018    CREATININE 0.70 05/23/2018     Venous duplex 7/30/2018    1.  No evidence of Right  lower extremity deep venous thrombosis.    2.  There is incidental superficial thrombus in a short segment of the right greater saphenous vein in the proximal calf.     Venous duplex 8/14/18   Thrombus is seen throughout the greater saphenou vein in the RT LE.  At the    groin the thrombus comes to 1 cm of the common femoral vein.  No deep vein    thrombus seen in the rt le.       Medical Decision Making:  Today's Assessment / Status / Plan:       1. Pain of right lower extremity  LE Venous Duplex    D-DIMER    BASIC METABOLIC PANEL     Indication for anticoagulation: Since the clot has grown and is now higher in location and could be near the Deep vein system I will treat with Xarelto 15 mg bid until we have confirmed the clot location.  Clot location is within 1 cm of the deep vein system. "     Anti-Platelet/Anti-Coagulant Tx: Hold the asa while taking Xarelto     Anti-Coagulation Plan:Discussed with the pt the chest guidelines indicate no anticoagulant therapy for superficial clots but due to  increase in symptoms and that the clot is now within 1 cm of the deep vein system the pt is in agreement to treat with Xarelto 15 mg bid, and to be seen in the clinic next week.      Smoking:None    Physical Activity: frequency : goal is  3-4 times a week    Weight Management and Nutrition: Regular diet but limiting portions.     Instructed to follow-up with PCP for remainder of adult medical needs: yes  We will partner with other provider in the management of established vascular disease and cardiometabolic risk factors    Studies to Be Obtained: Venous duplex rt leg  Labs to Be Obtained: D-dimer, BMP.     Follow up in: in ac clinic for education on Xarelto next week.      Joslyn Wright, A.P.N.     Agree with above   Agree with starting anticoagulation   Would continue for 3 months then rescan and bring patient back to vascular medicine clinic to discuss whether or not to continue with extended anticoagulation    Michael J. Bloch, MD  [facility]    CC:   DANTE Nobles Jason A, M.D.

## 2018-08-15 ENCOUNTER — HOSPITAL ENCOUNTER (OUTPATIENT)
Dept: LAB | Facility: MEDICAL CENTER | Age: 53
End: 2018-08-15
Attending: NURSE PRACTITIONER
Payer: COMMERCIAL

## 2018-08-15 DIAGNOSIS — M79.604 PAIN OF RIGHT LOWER EXTREMITY: ICD-10-CM

## 2018-08-15 LAB
ANION GAP SERPL CALC-SCNC: 5 MMOL/L (ref 0–11.9)
BUN SERPL-MCNC: 13 MG/DL (ref 8–22)
CALCIUM SERPL-MCNC: 9 MG/DL (ref 8.5–10.5)
CHLORIDE SERPL-SCNC: 106 MMOL/L (ref 96–112)
CO2 SERPL-SCNC: 28 MMOL/L (ref 20–33)
CREAT SERPL-MCNC: 0.81 MG/DL (ref 0.5–1.4)
DEPRECATED D DIMER PPP IA-ACNC: 608 NG/ML(D-DU)
GLUCOSE SERPL-MCNC: 102 MG/DL (ref 65–99)
POTASSIUM SERPL-SCNC: 4.2 MMOL/L (ref 3.6–5.5)
SODIUM SERPL-SCNC: 139 MMOL/L (ref 135–145)

## 2018-08-15 PROCEDURE — 36415 COLL VENOUS BLD VENIPUNCTURE: CPT

## 2018-08-15 PROCEDURE — 80048 BASIC METABOLIC PNL TOTAL CA: CPT

## 2018-08-15 PROCEDURE — 85379 FIBRIN DEGRADATION QUANT: CPT

## 2018-08-16 ENCOUNTER — TELEPHONE (OUTPATIENT)
Dept: VASCULAR LAB | Facility: MEDICAL CENTER | Age: 53
End: 2018-08-16

## 2018-08-16 DIAGNOSIS — I82.90 VENOUS THROMBOSIS: ICD-10-CM

## 2018-08-16 NOTE — TELEPHONE ENCOUNTER
LM on VM that She will need to do a venous duplex again in 3 months and then will need to see us in the vascular office for an LOT. CLOVER Alvarado.

## 2018-08-20 ENCOUNTER — TELEPHONE (OUTPATIENT)
Dept: VASCULAR LAB | Facility: MEDICAL CENTER | Age: 53
End: 2018-08-20

## 2018-08-20 ENCOUNTER — ANTICOAGULATION VISIT (OUTPATIENT)
Dept: VASCULAR LAB | Facility: MEDICAL CENTER | Age: 53
End: 2018-08-20
Attending: INTERNAL MEDICINE
Payer: COMMERCIAL

## 2018-08-20 VITALS — DIASTOLIC BLOOD PRESSURE: 91 MMHG | SYSTOLIC BLOOD PRESSURE: 141 MMHG | HEART RATE: 72 BPM

## 2018-08-20 DIAGNOSIS — I82.811 SUPERFICIAL THROMBOSIS OF LEG, RIGHT: ICD-10-CM

## 2018-08-20 DIAGNOSIS — I82.90 VENOUS THROMBOSIS: ICD-10-CM

## 2018-08-20 LAB — INR PPP: 1.4 (ref 2–3.5)

## 2018-08-20 PROCEDURE — 85610 PROTHROMBIN TIME: CPT

## 2018-08-20 PROCEDURE — 99213 OFFICE O/P EST LOW 20 MIN: CPT | Performed by: NURSE PRACTITIONER

## 2018-08-20 NOTE — PROGRESS NOTES
Diagnosis: RLE superficial vein thrombosis on 7/30/18, repeat scan on 8/14/18 shows clot extension  Drug: Xarelto 15 mg BID then 20 mg daily  LOT: 3 months, 11/14/18    Health Status Since Last Assessment  Pt dx with superficial clot to RLE on 8/2/18. Started on ASA 81 mg. Leg became more painful and swollen. Seen in vascular last week. Started on Xarelto 15 mg BID x 3 weeks.    Tolerating well. No problems with bleeding. Using samples so unsure of copay.    US 7/30/18   There is incidental superficial thrombus in a short segment of the right greater saphenous vein in the proximal calf    US 8/14/18  Thrombus is seen throughout the greater saphenous vein in the right lower extremity. At the groin, the thrombus comes to 1 cm of the common femoral  vein. Thrombus within varicosities at the knee.    Adherence with DOAC Therapy  None    Bleeding Risk Assessment  None of the following:  Severe epistaxis   Hemoptysis    Excessive or unusual bruising / hematomas   GIB / melena / BRBPR / hematemesis    Hematuria? Abnormal vaginal bleeding    Concerning daily headache or subdural hematoma symptoms    Decreasing hemoglobin or new anemia    Latest hemoglobin:     Lab Results   Component Value Date/Time    WBC 10.3 12/22/2014 01:57 PM    RBC 5.05 12/22/2014 01:57 PM    HEMOGLOBIN 13.9 12/22/2014 01:57 PM    HEMATOCRIT 42.9 12/22/2014 01:57 PM    MCV 85.0 12/22/2014 01:57 PM    MCH 27.5 12/22/2014 01:57 PM    MCHC 32.4 (L) 12/22/2014 01:57 PM    MPV 9.4 12/22/2014 01:57 PM    NEUTSPOLYS 68.8 10/12/2010 04:50 PM    LYMPHOCYTES 20.4 (L) 10/12/2010 04:50 PM    MONOCYTES 6.3 10/12/2010 04:50 PM    EOSINOPHILS 3.9 10/12/2010 04:50 PM    BASOPHILS 0.6 10/12/2010 04:50 PM    HYPOCHROMIA 1+ 10/12/2010 04:50 PM        EtOH overuse - denies  Falls, presyncope, syncope, or seizures - no  Uncontrolled hypertension - no  CREATININE CLEARANCE /    Creatinine Clearance/Renal Function  Latest eGFR / creatinine:  Lab Results   Component Value  Date/Time    SODIUM 139 08/15/2018 09:30 AM    POTASSIUM 4.2 08/15/2018 09:30 AM    CHLORIDE 106 08/15/2018 09:30 AM    CO2 28 08/15/2018 09:30 AM    GLUCOSE 102 (H) 08/15/2018 09:30 AM    BUN 13 08/15/2018 09:30 AM    CREATININE 0.81 08/15/2018 09:30 AM      • Is eGFR less than 50ml/min  - no  If YES, calculate CrCl (see back)  Any recent dehydrating illness or medications added/changed? i.e. diuretics    Drug Interactions  ASA / other antiplatelets - no  NSAID - takes 400-600 mg in AM and PM for arthritic pain as needed. Acetaminophen doesn't help. Explained the NSAIDs increase her risk of bleeding.  Other drug interactions   (Review med list / OTCs;)  Current Outpatient Prescriptions on File Prior to Visit   Medication Sig Dispense Refill   • levothyroxine (SYNTHROID) 175 MCG Tab Take 1 Tab by mouth every day. 90 Tab 3   • Cholecalciferol (VITAMIN D3) 2000 UNIT CAPS Take 4,000 Units by mouth every day.     • cetirizine (ZYRTEC) 10 MG TABS Take 10 mg by mouth every day.     • Evening Primrose OIL Take 1 Tab by mouth every morning.       No current facility-administered medications on file prior to visit.        Examination  Blood pressure:  141/91  • Elevated BP  no (sBP greater than 160 mmHg)  • Symptomatic hypotension  no  Significant gait impairment / imbalance / high risk for falls  - no    Final Assessment and Recommendations:  Patient appears stable from the anticoagulation standpoint  Benefits of continued DOAC therapy outweigh risks for this patient  Recommend continue current DOAC at same dose    Rx sent for 20 mg. She will finish 15 mg BID then start 20 mg daily. Instructed to elevate leg as much as possible. Seek medical attention for any worsening swelling, pain, redness or for SOB or prolonged bleeding lasting > 20 min without stop. She will not stop this medication without permission from your doctor or our clinic.    Other Actions:   The rationale for continued DOAC therapy  The potential for minor,  major or life-threatening bleeding  Dosing instructions, adherence, risks of non-adherence, handling missed doses  Avoiding OTC ASA & NSAIDs & minimizing EtOH to reduce bleeding risks  Dosing around upcoming procedure / surgery if applicable (see back)    Follow up:  Will follow up with patient in 2 weeks     Jeannine LABOY    Cc:  Bloch

## 2018-08-21 LAB — INR BLD: 1.4 (ref 0.9–1.2)

## 2018-09-04 ENCOUNTER — ANTICOAGULATION VISIT (OUTPATIENT)
Dept: VASCULAR LAB | Facility: MEDICAL CENTER | Age: 53
End: 2018-09-04
Attending: INTERNAL MEDICINE
Payer: COMMERCIAL

## 2018-09-04 VITALS — DIASTOLIC BLOOD PRESSURE: 80 MMHG | SYSTOLIC BLOOD PRESSURE: 122 MMHG | HEART RATE: 75 BPM

## 2018-09-04 DIAGNOSIS — I82.90 VENOUS THROMBOSIS: ICD-10-CM

## 2018-09-04 LAB
INR BLD: 1.3 (ref 0.9–1.2)
INR PPP: 1.3 (ref 2–3.5)

## 2018-09-04 PROCEDURE — 85610 PROTHROMBIN TIME: CPT

## 2018-09-04 PROCEDURE — 99211 OFF/OP EST MAY X REQ PHY/QHP: CPT

## 2018-09-04 NOTE — PROGRESS NOTES
Target end date:11/14/18     Indication: RLE superficial richard thrombosis     Drug: Xarelto 20mg QD     LOT: 3 months (11/14/18)      Health Status Since Last Assessment   Patient denies any new relevant medical problems, ED visits or hospitalizations   Patient denies any embolic events (stroke/tia/systemic embolism)    Adherence with DOAC Therapy   Pt has NO missed any doses in the average week    Bleeding Risk Assessment     NO Epistaxis   Pt denies any excessive or unusual bleeding/hematomas.  Pt denies any GI bleeds or hematemesis.  Pt denies any concerning daily headache or sub dural hematoma symptoms.     Pt denies any hematuria or abnormal vaginal bleeding.   Latest Hemoglobin 13.9   ETOH overuse no     Creatinine Clearance/Renal Function     Latest ClCr 67.8 ml/min     Drug Interactions   Platelets: 347   ASA/other antiplatelets none   NSAID yes- she had bad arthritis and understands that the use does increase her risk of bleeding, but it is the only thing that helps with her arthritis pain   Other drug interactions none    Verified no anticonvulsant or azole therapy, education provided for future use.     Examination   Blood Pressure 122/80   Symptomatic hypotension no   Significant gait impairment/imbalance/high risk for falls? no    Final Assessment and Recommendations:   Patient appears stable from the anticoagulation staindpoint.     Benefits of continued DOAC therapy outweigh risks for this patient   Recommend pt continue with current DOAC therapy Xarelto 20mg QD (with dose adjustment)     Other Actions: Follow up:   Patient will schedule follow up with Vascular for EOT appt.      Carline AlbrightD

## 2018-11-08 ENCOUNTER — TELEPHONE (OUTPATIENT)
Dept: VASCULAR LAB | Facility: MEDICAL CENTER | Age: 53
End: 2018-11-08

## 2018-11-09 NOTE — TELEPHONE ENCOUNTER
Called pt to remind that the  Venous duplex for LOT appt   is due for surveillance. Scheduling office and our office phone numbers provided. CLOVER Alvarado.

## 2018-11-25 ENCOUNTER — OFFICE VISIT (OUTPATIENT)
Dept: URGENT CARE | Facility: PHYSICIAN GROUP | Age: 53
End: 2018-11-25
Payer: COMMERCIAL

## 2018-11-25 VITALS
OXYGEN SATURATION: 95 % | HEART RATE: 107 BPM | SYSTOLIC BLOOD PRESSURE: 142 MMHG | WEIGHT: 290 LBS | HEIGHT: 64 IN | BODY MASS INDEX: 49.51 KG/M2 | RESPIRATION RATE: 18 BRPM | TEMPERATURE: 97.9 F | DIASTOLIC BLOOD PRESSURE: 86 MMHG

## 2018-11-25 DIAGNOSIS — M70.31 BURSITIS OF RIGHT ELBOW, UNSPECIFIED BURSA: ICD-10-CM

## 2018-11-25 PROCEDURE — 99214 OFFICE O/P EST MOD 30 MIN: CPT | Performed by: PHYSICIAN ASSISTANT

## 2018-11-25 RX ORDER — PREDNISONE 20 MG/1
20 TABLET ORAL DAILY
Qty: 5 TAB | Refills: 0 | Status: SHIPPED | OUTPATIENT
Start: 2018-11-25 | End: 2018-11-30

## 2018-11-25 ASSESSMENT — ENCOUNTER SYMPTOMS
MUSCLE WEAKNESS: 0
CHILLS: 0
NUMBNESS: 0
TINGLING: 0
SENSORY CHANGE: 0
FOCAL WEAKNESS: 0
FEVER: 0

## 2018-11-25 NOTE — PATIENT INSTRUCTIONS
Elbow Bursitis  Introduction  Elbow bursitis is inflammation of the fluid-filled sac (bursa) between the tip of your elbow bone (olecranon) and your skin. Elbow bursitis may also be called olecranon bursitis.  Normally, the olecranon bursa has only a small amount of fluid in it to cushion and protect your elbow bone. Elbow bursitis causes fluid to build up inside the bursa. Over time, this swelling and inflammation can cause pain when you bend or lean on your elbow.  What are the causes?  Elbow bursitis may be caused by:  · Elbow injury (acute trauma).  · Leaning on hard surfaces for long periods of time.  · Infection from an injury that breaks the skin near your elbow.  · A bone growth (spur) that forms at the tip of your elbow.  · A medical condition that causes inflammation in your body, such as gout or rheumatoid arthritis.  The cause may also be unknown.  What are the signs or symptoms?  The first sign of elbow bursitis is usually swelling over the tip of your elbow. This can grow to be the size of a golf ball. This may start suddenly or develop gradually. You may also have:  · Pain when bending or leaning on your elbow.  · Restricted movement of your elbow.  If your bursitis is caused by an infection, symptoms may also include:  · Redness, warmth, and tenderness of the elbow.  · Drainage of pus from the swollen area over your elbow, if the skin breaks open.  How is this diagnosed?  Your health care provider may be able to diagnose elbow bursitis based on your signs and symptoms, especially if you have recently been injured. Your health care provider will also do a physical exam. This may include:  · X-rays to look for a bone spur or a bone fracture.  · Draining fluid from the bursa to test it for infection.  · Blood tests to rule out gout or rheumatoid arthritis.  How is this treated?  Treatment for elbow bursitis depends on the cause. Treatment may include:  · Medicines. These may include:  ¨ Over-the-counter  medicines to relieve pain and inflammation.  ¨ Antibiotic medicines to fight infection.  ¨ Injections of anti-inflammatory medicines (steroids).  · Wrapping your elbow with a bandage.  · Draining fluid from the bursa.  · Wearing elbow pads.  If your bursitis does not get better with treatment, surgery may be needed to remove the bursa.  Follow these instructions at home:  · Take medicines only as directed by your health care provider.  · If you were prescribed an antibiotic medicine, finish all of it even if you start to feel better.  · If your bursitis is caused by an injury, rest your elbow and wear your bandage as directed by your health care provider. You may also apply ice to the injured area as directed by your health care provider:  ¨ Put ice in a plastic bag.  ¨ Place a towel between your skin and the bag.  ¨ Leave the ice on for 20 minutes, 2-3 times per day.  · Avoid any activities that cause elbow pain.  · Use elbow pads or elbow wraps to cushion your elbow.  Contact a health care provider if:  · You have a fever.  · Your symptoms do not get better with treatment.  · Your pain or swelling gets worse.  · Your elbow pain or swelling goes away and then returns.  · You have drainage of pus from the swollen area over your elbow.  This information is not intended to replace advice given to you by your health care provider. Make sure you discuss any questions you have with your health care provider.  Document Released: 01/17/2008 Document Revised: 05/25/2017 Document Reviewed: 08/26/2015  © 2017 [employer]

## 2018-11-25 NOTE — PROGRESS NOTES
"Subjective:      Antonella Frausto is a 53 y.o. female who presents with Elbow Pain (red and swollen since friday night)    PMH:  has a past medical history of Arthritis; Bronchitis (12-15-14); Hypothyroidism; Increased BMI; and Pain (12-22-14).  MEDS:   Current Outpatient Prescriptions:   •  rivaroxaban (XARELTO) 20 MG Tab tablet, Take 1 Tab by mouth with dinner., Disp: 30 Tab, Rfl: 3  •  levothyroxine (SYNTHROID) 175 MCG Tab, Take 1 Tab by mouth every day., Disp: 90 Tab, Rfl: 3  •  Cholecalciferol (VITAMIN D3) 2000 UNIT CAPS, Take 4,000 Units by mouth every day., Disp: , Rfl:   •  rivaroxaban (XARELTO) 15 MG Tab tablet, Take  by mouth with dinner., Disp: , Rfl:   •  cetirizine (ZYRTEC) 10 MG TABS, Take 10 mg by mouth every day., Disp: , Rfl:   •  Evening Primrose OIL, Take 1 Tab by mouth every morning., Disp: , Rfl:   ALLERGIES:   Allergies   Allergen Reactions   • Asa [Aspirin] Vomiting and Nausea   • Codeine Vomiting and Nausea   • Latex      \"irritates my skin\"     SURGHX:   Past Surgical History:   Procedure Laterality Date   • KNEE ARTHROSCOPY  12/29/2014    Performed by Chas Titus M.D. at SURGERY SAME DAY [facility]   • MEDIAL MENISCECTOMY  12/29/2014    Performed by Chas Titus M.D. at SURGERY SAME DAY [facility]   • CHONDROPLASTY  12/29/2014    Performed by Chas Titus M.D. at SURGERY SAME DAY [facility]   • THYROIDECTOMY TOTAL  10/20/2010    Performed by SHABBIR BROOKE at SURGERY SAME DAY [facility]   • NODE BIOPSY  10/20/2010    Performed by SHABBIR BROOKE at SURGERY SAME DAY [facility]   • CHOLECYSTECTOMY  1995   • GYN SURGERY      D & C with miscarrage      SOCHX:  reports that she has never smoked. She has never used smokeless tobacco. She reports that she does not drink alcohol or use drugs.  FH: family history includes Cancer in her mother; Heart Disease in her father.          Patient presents with:  Elbow Pain: red and swollen since friday night.  Patient denies any " "recent trauma to her elbow, no falls, no history of eczema, no fever or swelling to her hand.  Patient states she has been doing some quilting recently and has been doing some repetitive movements.  Patient states she has a history of arthritis but this feels a little bit different than that.  Patient has not tried any over-the-counter medications for her symptoms.  Patient is on Xarelto so she does not take ibuprofen and Tylenol does not work for her.  Pt is left handed.     Arm Pain    The incident occurred 2 days ago. The incident occurred at home. There was no injury mechanism. The pain is present in the right elbow. The quality of the pain is described as aching. The pain does not radiate. The pain is mild. The pain has been constant since the incident. Pertinent negatives include no muscle weakness, numbness or tingling. The symptoms are aggravated by palpation and movement. She has tried ice and rest for the symptoms. The treatment provided mild relief.       Review of Systems   Constitutional: Negative for chills and fever.   Musculoskeletal: Positive for joint pain.   Neurological: Negative for tingling, sensory change, focal weakness and numbness.   All other systems reviewed and are negative.         Objective:     /86   Pulse (!) 107   Temp 36.6 °C (97.9 °F)   Resp 18   Ht 1.626 m (5' 4\")   Wt (!) 131.5 kg (290 lb)   SpO2 95%   BMI 49.78 kg/m²      Physical Exam   Constitutional: She is oriented to person, place, and time. She appears well-developed and well-nourished. No distress.   HENT:   Head: Normocephalic and atraumatic.   Nose: Nose normal.   Eyes: Pupils are equal, round, and reactive to light. Conjunctivae and EOM are normal.   Neck: Normal range of motion. Neck supple.   Cardiovascular: Normal rate and intact distal pulses.    Pulmonary/Chest: Effort normal.   Musculoskeletal:        Right elbow: She exhibits normal range of motion, no swelling and no effusion. Tenderness found.    "     Arms:  Quarter size area of erythema to the olecranon process of right elbow.  No warmth at this point.  This appears to be an early bursitis, as her skin is intact , no lesion, abrasion, blister, or evidence of injury to this elbow.  Patient has full range of motion of arm.  Distal neurovascular is intact.   Neurological: She is alert and oriented to person, place, and time.   Skin: Skin is warm and dry. Capillary refill takes less than 2 seconds.   Psychiatric: She has a normal mood and affect.   Nursing note and vitals reviewed.              Assessment/Plan:      1. Bursitis of right elbow, unspecified bursa  predniSONE (DELTASONE) 20 MG Tab     Patient denied x-rays at this point which I think is reasonable as she has no history of injury to this elbow.  There is no significant swelling to the elbow at this point.  Will try a course of steroids for symptoms.  Patient was instructed to return to the clinic with any worsening of condition.    Patient was reminded not to take ibuprofen as she is on Xarelto.  Patient verbalized understanding.    PT should follow up with PCP in 1-2 days for re-evaluation if symptoms have not improved.  Discussed red flags and reasons to return to UC or ED.  Pt and/or family verbalized understanding of diagnosis and follow up instructions and was offered informational handout on diagnosis.  PT discharged.

## 2018-12-04 DIAGNOSIS — M25.521 RIGHT ELBOW PAIN: ICD-10-CM

## 2018-12-05 ENCOUNTER — TELEPHONE (OUTPATIENT)
Dept: VASCULAR LAB | Facility: MEDICAL CENTER | Age: 53
End: 2018-12-05

## 2018-12-05 NOTE — TELEPHONE ENCOUNTER
Called pt to remind that the  Venous duplex for LOT appt   is due for surveillance. Scheduling office and our office phone numbers provided. RGEGORY AlvaradoN

## 2019-01-03 ENCOUNTER — TELEPHONE (OUTPATIENT)
Dept: VASCULAR LAB | Facility: MEDICAL CENTER | Age: 54
End: 2019-01-03

## 2019-01-03 NOTE — TELEPHONE ENCOUNTER
[facility]    Received request to refill Xarelto. Left VM with pt requesting she calls the clinic to make a LOT appointment.     Floyd Rodriguez, PharmD

## 2019-01-10 ENCOUNTER — TELEPHONE (OUTPATIENT)
Dept: VASCULAR LAB | Facility: MEDICAL CENTER | Age: 54
End: 2019-01-10

## 2019-01-10 NOTE — TELEPHONE ENCOUNTER
Called pt to remind that the  Venous duplex for LOT appt   is due for surveillance. Scheduling office and our office phone numbers provided. GREGORY AlvaradoN

## 2019-02-14 ENCOUNTER — TELEPHONE (OUTPATIENT)
Dept: VASCULAR LAB | Facility: MEDICAL CENTER | Age: 54
End: 2019-02-14

## 2019-02-21 ENCOUNTER — TELEPHONE (OUTPATIENT)
Dept: VASCULAR LAB | Facility: MEDICAL CENTER | Age: 54
End: 2019-02-21

## 2019-02-21 DIAGNOSIS — I82.90 VENOUS THROMBOSIS: ICD-10-CM

## 2019-03-02 ENCOUNTER — OFFICE VISIT (OUTPATIENT)
Dept: URGENT CARE | Facility: PHYSICIAN GROUP | Age: 54
End: 2019-03-02
Payer: COMMERCIAL

## 2019-03-02 VITALS
OXYGEN SATURATION: 95 % | RESPIRATION RATE: 16 BRPM | HEART RATE: 83 BPM | DIASTOLIC BLOOD PRESSURE: 90 MMHG | BODY MASS INDEX: 49.51 KG/M2 | WEIGHT: 290 LBS | TEMPERATURE: 97.3 F | SYSTOLIC BLOOD PRESSURE: 142 MMHG | HEIGHT: 64 IN

## 2019-03-02 DIAGNOSIS — L50.9 HIVES: ICD-10-CM

## 2019-03-02 PROCEDURE — 99214 OFFICE O/P EST MOD 30 MIN: CPT | Performed by: PHYSICIAN ASSISTANT

## 2019-03-02 RX ORDER — HYDROXYZINE 50 MG/1
50 TABLET, FILM COATED ORAL 3 TIMES DAILY PRN
Qty: 30 TAB | Refills: 0 | Status: SHIPPED | OUTPATIENT
Start: 2019-03-02 | End: 2019-03-12

## 2019-03-02 RX ORDER — METHYLPREDNISOLONE 4 MG/1
4 TABLET ORAL DAILY
Qty: 1 KIT | Refills: 0 | Status: SHIPPED | OUTPATIENT
Start: 2019-03-02 | End: 2019-03-18

## 2019-03-02 ASSESSMENT — ENCOUNTER SYMPTOMS
FEVER: 0
PALPITATIONS: 0
SHORTNESS OF BREATH: 0
COUGH: 0

## 2019-03-03 NOTE — PROGRESS NOTES
"Subjective:      Antonella Frausto is a 53 y.o. female who presents with Rash (onset last night// chest// ichy )            Rash   This is a new problem. The current episode started yesterday. The problem is unchanged. The affected locations include the torso and chest. The rash is characterized by swelling, redness and itchiness. It is unknown if there was an exposure to a precipitant. Pertinent negatives include no cough, fever or shortness of breath. Past treatments include nothing.       Review of Systems   Constitutional: Negative for fever and malaise/fatigue.   Respiratory: Negative for cough and shortness of breath.    Cardiovascular: Negative for chest pain and palpitations.   Skin: Positive for itching and rash.   All other systems reviewed and are negative.    PMH:  has a past medical history of Arthritis; Bronchitis (12-15-14); Hypothyroidism; Increased BMI; and Pain (12-22-14).  MEDS:   Current Outpatient Prescriptions:   •  MethylPREDNISolone (MEDROL DOSEPAK) 4 MG Tablet Therapy Pack, Take 1 Tab by mouth every day., Disp: 1 Kit, Rfl: 0  •  hydrOXYzine HCl (ATARAX) 50 MG Tab, Take 1 Tab by mouth 3 times a day as needed for Itching for up to 10 days., Disp: 30 Tab, Rfl: 0  •  levothyroxine (SYNTHROID) 175 MCG Tab, Take 1 Tab by mouth every day., Disp: 90 Tab, Rfl: 3  •  Cholecalciferol (VITAMIN D3) 2000 UNIT CAPS, Take 4,000 Units by mouth every day., Disp: , Rfl:   •  cetirizine (ZYRTEC) 10 MG TABS, Take 10 mg by mouth every day., Disp: , Rfl:   •  Evening Primrose OIL, Take 1 Tab by mouth every morning., Disp: , Rfl:   •  rivaroxaban (XARELTO) 15 MG Tab tablet, Take  by mouth with dinner., Disp: , Rfl:   •  rivaroxaban (XARELTO) 20 MG Tab tablet, Take 1 Tab by mouth with dinner. (Patient not taking: Reported on 3/2/2019), Disp: 30 Tab, Rfl: 3  ALLERGIES:   Allergies   Allergen Reactions   • Asa [Aspirin] Vomiting and Nausea   • Codeine Vomiting and Nausea   • Latex      \"irritates my skin\"     SURGHX: " "  Past Surgical History:   Procedure Laterality Date   • KNEE ARTHROSCOPY  12/29/2014    Performed by Chas Titus M.D. at SURGERY SAME DAY [facility]   • MEDIAL MENISCECTOMY  12/29/2014    Performed by Chas Titus M.D. at SURGERY SAME DAY [facility]   • CHONDROPLASTY  12/29/2014    Performed by Chas Titus M.D. at SURGERY SAME DAY [facility]   • THYROIDECTOMY TOTAL  10/20/2010    Performed by SHABBIR BROOKE at SURGERY SAME DAY [facility]   • NODE BIOPSY  10/20/2010    Performed by SHABBIR BROOKE at SURGERY SAME DAY [facility]   • CHOLECYSTECTOMY  1995   • GYN SURGERY      D & C with miscarraggisel      SOCHX:  reports that she has never smoked. She has never used smokeless tobacco. She reports that she does not drink alcohol or use drugs.  FH: Family history was reviewed, no pertinent findings to report  Medications, Allergies, and current problem list reviewed today in Epic       Objective:     /90   Pulse 83   Temp 36.3 °C (97.3 °F) (Temporal)   Resp 16   Ht 1.626 m (5' 4\")   Wt (!) 131.5 kg (290 lb)   SpO2 95%   BMI 49.78 kg/m²      Physical Exam   Constitutional: She appears well-developed and well-nourished.   Cardiovascular: Normal rate, regular rhythm and normal heart sounds.    Pulmonary/Chest: Effort normal and breath sounds normal.   Skin: Skin is warm and dry. Rash noted. Rash is urticarial. There is erythema.   Psychiatric: She has a normal mood and affect. Her behavior is normal. Judgment and thought content normal.   Vitals reviewed.              Assessment/Plan:     1. Hives    - MethylPREDNISolone (MEDROL DOSEPAK) 4 MG Tablet Therapy Pack; Take 1 Tab by mouth every day.  Dispense: 1 Kit; Refill: 0  - hydrOXYzine HCl (ATARAX) 50 MG Tab; Take 1 Tab by mouth 3 times a day as needed for Itching for up to 10 days.  Dispense: 30 Tab; Refill: 0    Differential diagnosis, natural history, supportive care discussed. Follow-up with primary care provider within 7-10 days, " emergency room precautions discussed.  Patient and/or family appears understanding of information.  Handout and review of patients diagnosis and treatment was discussed extensively.

## 2019-03-05 ENCOUNTER — HOSPITAL ENCOUNTER (OUTPATIENT)
Dept: RADIOLOGY | Facility: MEDICAL CENTER | Age: 54
End: 2019-03-05
Attending: NURSE PRACTITIONER
Payer: COMMERCIAL

## 2019-03-05 DIAGNOSIS — I82.90 VENOUS THROMBOSIS: ICD-10-CM

## 2019-03-05 PROCEDURE — 93971 EXTREMITY STUDY: CPT | Mod: RT

## 2019-03-18 ENCOUNTER — ANTICOAGULATION VISIT (OUTPATIENT)
Dept: VASCULAR LAB | Facility: MEDICAL CENTER | Age: 54
End: 2019-03-18
Attending: INTERNAL MEDICINE
Payer: COMMERCIAL

## 2019-03-18 VITALS — SYSTOLIC BLOOD PRESSURE: 134 MMHG | DIASTOLIC BLOOD PRESSURE: 90 MMHG | HEART RATE: 86 BPM

## 2019-03-18 DIAGNOSIS — I82.90 VENOUS THROMBOSIS: ICD-10-CM

## 2019-03-18 PROCEDURE — 99212 OFFICE O/P EST SF 10 MIN: CPT | Performed by: NURSE PRACTITIONER

## 2019-03-18 PROCEDURE — 99213 OFFICE O/P EST LOW 20 MIN: CPT | Performed by: NURSE PRACTITIONER

## 2019-03-18 NOTE — PROGRESS NOTES
"VASCULAR ANTI-COAGULATION VISIT  Subjective:   Antonella Frausto is a 53 y.o. female who presents today 3/18/2019 for   No chief complaint on file.      HPI: Patient presents today for vascular follow up  Had 1st time VTE in Aug 2018 - \" Thrombus is seen throughout the greater saphenou vein in the right lower extremity.  At the groin, the thrombus comes to 1 cm of the common femoral vein.\"  Initially started on ASA 81 mg but switched to Xarelto due to increased pain/swelling  Denied any prior surgeries, injuries or extended travel  She likes to knit and often sits for long periods of time  She completed 3 months of Xarelto - stopped taking it in Dec 2018  Had repeat scan done earlier this month - \"There is no evidence of luminal thrombus. There is normal augmentation to flow and respiratory variation.\"  No further leg swelling or pain  No SOB   One brother with hx of blood clot - circumstances unclear  Mother had PE after chemotherapy  No other FH  No hx of cancer but she is not up to date on age appropriate cancer screening. Never had a colonoscopy, last mammogram > 1 year ago      Social History   Substance Use Topics   • Smoking status: Never Smoker   • Smokeless tobacco: Never Used   • Alcohol use No     DIET AND EXERCISE:  Weight Change: none  Diet: common adult  Exercise: no regular exercise program     ROS   Objective:     Vitals:    03/18/19 0917   BP: 134/90   Pulse: 86     There is no height or weight on file to calculate BMI.  Physical Exam  Lab Results   Component Value Date    CHOLSTRLTOT 148 05/23/2018    LDL 93 05/23/2018    HDL 37 (A) 05/23/2018    TRIGLYCERIDE 92 05/23/2018      Lab Results   Component Value Date    INR 1.3 09/04/2018       Lab Results   Component Value Date    HBA1C 5.9 (H) 05/23/2018      Lab Results   Component Value Date    SODIUM 139 08/15/2018    POTASSIUM 4.2 08/15/2018    CHLORIDE 106 08/15/2018    CO2 28 08/15/2018    GLUCOSE 102 (H) 08/15/2018    BUN 13 08/15/2018    " CREATININE 0.81 08/15/2018          Medical Decision Making:  Today's Assessment / Status / Plan:     1. Venous thrombosis       Indication for anticoagulation: Superficial clot in the RLE    Anti-Platelet/Anti-Coagulant Tx: Completed 3 months of Xarelto in Dec 2018    Anti-Coagulation Plan: Patient has completed 3 months of therapy and symptoms have resolved. Imaging from 3/5/19 shows no clots. We talked about the need for close surveillance for future symptoms as there is a 5-10% increase risk of recurrence after any VTE.    Start taking aspirin 81 mg daily  Let all your providers know you have a history of a blood clots  Seek medical attention immediately for any prolonged swelling, pain or redness to any extremity or for shortness of breath or pain with breathing  Avoid all hormone replacement therapy  Keep up with age appropriate cancer screenings as a small percentage of blood clots can be caused by an underlying cancer  Get up and walk around every 1 hour if sitting for prolonged periods of time  Elevate legs when possible  Establish with PCP    Smoking: continued abstinence    Physical Activity: frequency : goal is walking 3-4 times a week even for as little as 10 minutes per day    Weight Management and Nutrition:exercise counseling and nutrition counseling    Instructed to follow-up with PCP for remainder of adult medical needs: yes  We will partner with other provider in the management of established vascular disease and cardiometabolic risk factors    Studies to Be Obtained: none  Labs to Be Obtained: none    Follow up in vascular as needed. Establish with PCP.    CLOVER Mehta.    CC:   Bloch, Michael J, M.D.

## 2019-03-18 NOTE — PATIENT INSTRUCTIONS
Start taking aspirin 81 mg daily  Let all your providers know you have a history of a blood clots  Seek medical attention immediately for any prolonged swelling, pain or redness to any extremity or for shortness of breath or pain with breathing  Avoid all hormone replacement therapy  Keep up with age appropriate cancer screenings as a small percentage of blood clots can be caused by an underlying cancer  Get up and walk around every 1 hour if sitting for prolonged periods of time  Elevate legs when possible  Establish with PCP

## 2019-07-29 ENCOUNTER — OFFICE VISIT (OUTPATIENT)
Dept: MEDICAL GROUP | Facility: PHYSICIAN GROUP | Age: 54
End: 2019-07-29
Payer: COMMERCIAL

## 2019-07-29 ENCOUNTER — HOSPITAL ENCOUNTER (OUTPATIENT)
Dept: LAB | Facility: MEDICAL CENTER | Age: 54
End: 2019-07-29
Attending: NURSE PRACTITIONER
Payer: COMMERCIAL

## 2019-07-29 VITALS
DIASTOLIC BLOOD PRESSURE: 70 MMHG | BODY MASS INDEX: 50.02 KG/M2 | RESPIRATION RATE: 18 BRPM | HEART RATE: 98 BPM | HEIGHT: 64 IN | OXYGEN SATURATION: 92 % | SYSTOLIC BLOOD PRESSURE: 132 MMHG | WEIGHT: 293 LBS | TEMPERATURE: 96.8 F

## 2019-07-29 DIAGNOSIS — E55.9 VITAMIN D DEFICIENCY: ICD-10-CM

## 2019-07-29 DIAGNOSIS — Z11.59 NEED FOR HEPATITIS C SCREENING TEST: ICD-10-CM

## 2019-07-29 DIAGNOSIS — E78.5 DYSLIPIDEMIA: ICD-10-CM

## 2019-07-29 DIAGNOSIS — E78.6 LOW HDL (UNDER 40): ICD-10-CM

## 2019-07-29 DIAGNOSIS — E89.0 POSTOPERATIVE HYPOTHYROIDISM: ICD-10-CM

## 2019-07-29 LAB
ALBUMIN SERPL BCP-MCNC: 4.2 G/DL (ref 3.2–4.9)
ALBUMIN/GLOB SERPL: 1.4 G/DL
ALP SERPL-CCNC: 72 U/L (ref 30–99)
ALT SERPL-CCNC: 18 U/L (ref 2–50)
ANION GAP SERPL CALC-SCNC: 8 MMOL/L (ref 0–11.9)
AST SERPL-CCNC: 21 U/L (ref 12–45)
BILIRUB SERPL-MCNC: 0.5 MG/DL (ref 0.1–1.5)
BUN SERPL-MCNC: 17 MG/DL (ref 8–22)
CALCIUM SERPL-MCNC: 9.3 MG/DL (ref 8.5–10.5)
CHLORIDE SERPL-SCNC: 104 MMOL/L (ref 96–112)
CHOLEST SERPL-MCNC: 148 MG/DL (ref 100–199)
CO2 SERPL-SCNC: 27 MMOL/L (ref 20–33)
CREAT SERPL-MCNC: 0.95 MG/DL (ref 0.5–1.4)
GLOBULIN SER CALC-MCNC: 3 G/DL (ref 1.9–3.5)
GLUCOSE SERPL-MCNC: 88 MG/DL (ref 65–99)
HDLC SERPL-MCNC: 37 MG/DL
LDLC SERPL CALC-MCNC: 90 MG/DL
POTASSIUM SERPL-SCNC: 4.3 MMOL/L (ref 3.6–5.5)
PROT SERPL-MCNC: 7.2 G/DL (ref 6–8.2)
SODIUM SERPL-SCNC: 139 MMOL/L (ref 135–145)
TRIGL SERPL-MCNC: 104 MG/DL (ref 0–149)

## 2019-07-29 PROCEDURE — 82306 VITAMIN D 25 HYDROXY: CPT

## 2019-07-29 PROCEDURE — 84443 ASSAY THYROID STIM HORMONE: CPT

## 2019-07-29 PROCEDURE — 80053 COMPREHEN METABOLIC PANEL: CPT

## 2019-07-29 PROCEDURE — 36415 COLL VENOUS BLD VENIPUNCTURE: CPT

## 2019-07-29 PROCEDURE — 99214 OFFICE O/P EST MOD 30 MIN: CPT | Performed by: NURSE PRACTITIONER

## 2019-07-29 PROCEDURE — 80061 LIPID PANEL: CPT

## 2019-07-29 PROCEDURE — 86803 HEPATITIS C AB TEST: CPT

## 2019-07-29 PROCEDURE — 84439 ASSAY OF FREE THYROXINE: CPT

## 2019-07-29 PROCEDURE — 84481 FREE ASSAY (FT-3): CPT

## 2019-07-29 ASSESSMENT — PATIENT HEALTH QUESTIONNAIRE - PHQ9: CLINICAL INTERPRETATION OF PHQ2 SCORE: 0

## 2019-07-29 NOTE — ASSESSMENT & PLAN NOTE
This is a chronic problem for the patient that is ongoing.  Patient continues to take over-the-counter vitamin D3 4000 units/day.  She is due for updated lab work.   9/7/2012 08:57 1/29/2013 09:21 2/1/2014 07:57 10/7/2016 11:20 5/23/2018 09:29   25-Hydroxy   Vitamin D 25 18 (L) 39 44 37 29 (L)

## 2019-07-29 NOTE — PROGRESS NOTES
CC:   Chief Complaint   Patient presents with   • Medication Refill     90 day supply levothyroid     HISTORY OF THE PRESENT ILLNESS: Patient is a 54 y.o. female. This pleasant patient is here today to request a refill of levothyroxine and update labs.    Health Maintenance: Reviewed.    Postoperative hypothyroidism  This is a chronic problem for the patient that is ongoing.  Patient states she had a thyroidectomy in 2010.  Patient has been taking levothyroxine 175 mcg/day since surgery.  Patient reports that she feels okay at her current dose, but continues to struggle with weight loss.  She is due for updated lab work. Denies symptoms including palpitations, fatigue, heat/cold intolerance, diarrhea/constipation, abdominal pain, skin changes, hair changes, hair loss, brittle nails, or edema.   9/7/2012 08:57 2/1/2014 07:57 4/28/2014 10:14 9/28/2016 10:10 10/7/2016 11:20 4/19/2018 12:11   TSH 1.180 0.100 (L) 0.620 3.840 (H) 2.520 3.030   Thyroxine -T4 10.4        Free T-4 1.22 1.25 1.01  1.14 0.97       Dyslipidemia  This is a chronic problem for the patient that is ongoing.  The patient is not taking any medications for this issue.  She is due for updated lab work.   7/16/2010 09:11 8/17/2010 08:45 9/7/2012 08:57 1/29/2013 09:21 2/1/2014 07:57 5/23/2018 09:29   Cholesterol,Tot 134 133 127 147 125 148   Triglycerides 94 113 73 84 106 92   HDL 29 (L) 26 (L) 37 (A) 33 (A) 32 (A) 37 (A)   LDL 86 84 75 97 72 93       Vitamin D deficiency  This is a chronic problem for the patient that is ongoing.  Patient continues to take over-the-counter vitamin D3 4000 units/day.  She is due for updated lab work.   9/7/2012 08:57 1/29/2013 09:21 2/1/2014 07:57 10/7/2016 11:20 5/23/2018 09:29   25-Hydroxy   Vitamin D 25 18 (L) 39 44 37 29 (L)     Allergies: Asa [aspirin]; Codeine; and Latex  Current Outpatient Prescriptions Ordered in [facility]   Medication Sig Dispense Refill   • levothyroxine (SYNTHROID) 175 MCG Tab Take 1 Tab by mouth  every day. 90 Tab 3   • Cholecalciferol (VITAMIN D3) 2000 UNIT CAPS Take 4,000 Units by mouth every day.     • cetirizine (ZYRTEC) 10 MG TABS Take 10 mg by mouth every day.     • Evening Primrose OIL Take 1 Tab by mouth every morning.       No current [facility]-ordered facility-administered medications on file.      Past Medical History:   Diagnosis Date   • Arthritis     FEET & knees   • Bronchitis 12-15-14    on antibiotic   • Hypothyroidism    • Increased BMI    • Pain 12-22-14    right leg, feet, 4/10     Past Surgical History:   Procedure Laterality Date   • KNEE ARTHROSCOPY  12/29/2014    Performed by Chas Titus M.D. at SURGERY SAME DAY [facility]   • MEDIAL MENISCECTOMY  12/29/2014    Performed by Chas Titus M.D. at SURGERY SAME DAY [facility]   • CHONDROPLASTY  12/29/2014    Performed by Chas Titus M.D. at SURGERY SAME DAY [facility]   • THYROIDECTOMY TOTAL  10/20/2010    Performed by SHABBIR BROOKE at SURGERY SAME DAY [facility]   • NODE BIOPSY  10/20/2010    Performed by SHABBIR BROOKE at SURGERY SAME DAY [facility]   • CHOLECYSTECTOMY  1995   • GYN SURGERY      D & C with miscarrage      Social History   Substance Use Topics   • Smoking status: Never Smoker   • Smokeless tobacco: Never Used   • Alcohol use No     Social History     Social History Narrative   • No narrative on file     Family History   Problem Relation Age of Onset   • Cancer Mother         Thyroid ca, lymphoma   • Heart Disease Father      ROS:   Constitutional: Positive for weight gain.  Negative for fever, chills, unexpected weight change, night sweats, body aches, sleep issues, and fatigue/generalized weakness.   HEENT:  Negative for headaches, vision changes, hearing changes, ear pain, tinnitus, ear discharge, rhinorrhea, sinus congestion, sneezing, sore throat, and neck pain.    Respiratory:  Negative for cough, shortness of breath, sputum production, hemoptysis, chest congestion, dyspnea, wheezing, and  "crackles.    Cardiovascular:  Negative for chest pain, palpitations, LOONEY, paroxsymal nocturnal dyspnea, orthopnea, and bilateral lower extremity edema.   Skin:  Negative for rash, sores, lumps, itching, cyanotic skin color change.   Psychiatric/Behavioral: Negative for depression, suicidal/homicidal ideation and memory loss.        Exam: /70 (BP Location: Left arm, Patient Position: Sitting, BP Cuff Size: Adult)   Pulse 98   Temp 36 °C (96.8 °F) (Temporal)   Resp 18   Ht 1.626 m (5' 4\")   Wt (!) 133.8 kg (295 lb)   SpO2 92%  Body mass index is 50.64 kg/m².    GENERAL: No acute distress  HENT: Atraumatic, normocephalic  EYES: Extraocular movements intact, pupils equal and reactive to light  NECK: Supple, FROM  CHEST: No deformities, Equal chest expansion  RESP: Unlabored, no stridor or audible wheeze  ABD: Non-Distended  Extremities: No Clubbing, Cyanosis, or Edema  Skin: Warm/dry, without rashes  Neuro: A/O x 4, CN 2-12 Grossly intact, Motor/sensory grossly intact  Psych: Normal behavior, normal affect    Assessment/Plan:  1. Postoperative hypothyroidism  The patient has enough levothyroxine 175 mcg tablets to last her until Saturday, 8/3/2019.  The patient will be getting her labs drawn after this visit.  I will send in refill of her levothyroxine once I receive the results, patient understands and agrees.  - T3 FREE; Future  - FREE THYROXINE; Future  - TSH; Future    2. Dyslipidemia  3. Low HDL (under 40)  - Comp Metabolic Panel; Future  - Lipid Profile; Future    4. Vitamin D deficiency  Continue over-the-counter vitamin D3 supplement 4000 units/day.  - VITAMIN D,25 HYDROXY; Future    5. Need for hepatitis C screening test  - HEP C VIRUS ANTIBODY; Future    Educated in proper administration of medication(s) ordered today including safety, possible SE, risks, benefits, rationale and alternatives to therapy.   Supportive care, differential diagnoses, and indications for immediate follow-up discussed " with patient.    Pathogenesis of diagnosis discussed including typical length and natural progression.    Instructed to return to clinic or nearest emergency department for any change in condition, further concerns, or worsening of symptoms.  Patient states understanding of the plan of care and discharge instructions.    Return in 3 months (on 11/12/2019) for New patient establishing appointment.    Please note that this dictation was created using voice recognition software. I have made every reasonable attempt to correct obvious errors, but I expect that there are errors of grammar and possibly content that I did not discover before finalizing the note.

## 2019-07-29 NOTE — ASSESSMENT & PLAN NOTE
This is a chronic problem for the patient that is ongoing.  Patient states she had a thyroidectomy in 2010.  Patient has been taking levothyroxine 175 mcg/day since surgery.  Patient reports that she feels okay at her current dose, but continues to struggle with weight loss.  She is due for updated lab work. Denies symptoms including palpitations, fatigue, heat/cold intolerance, diarrhea/constipation, abdominal pain, skin changes, hair changes, hair loss, brittle nails, or edema.   9/7/2012 08:57 2/1/2014 07:57 4/28/2014 10:14 9/28/2016 10:10 10/7/2016 11:20 4/19/2018 12:11   TSH 1.180 0.100 (L) 0.620 3.840 (H) 2.520 3.030   Thyroxine -T4 10.4        Free T-4 1.22 1.25 1.01  1.14 0.97

## 2019-07-29 NOTE — ASSESSMENT & PLAN NOTE
This is a chronic problem for the patient that is ongoing.  The patient is not taking any medications for this issue.  She is due for updated lab work.   7/16/2010 09:11 8/17/2010 08:45 9/7/2012 08:57 1/29/2013 09:21 2/1/2014 07:57 5/23/2018 09:29   Cholesterol,Tot 134 133 127 147 125 148   Triglycerides 94 113 73 84 106 92   HDL 29 (L) 26 (L) 37 (A) 33 (A) 32 (A) 37 (A)   LDL 86 84 75 97 72 93

## 2019-07-30 DIAGNOSIS — E89.0 POSTOPERATIVE HYPOTHYROIDISM: ICD-10-CM

## 2019-07-30 LAB
25(OH)D3 SERPL-MCNC: 31 NG/ML (ref 30–100)
HCV AB SER QL: NEGATIVE
T3FREE SERPL-MCNC: 3.16 PG/ML (ref 2.4–4.2)
T4 FREE SERPL-MCNC: 1.26 NG/DL (ref 0.53–1.43)
TSH SERPL DL<=0.005 MIU/L-ACNC: 5.87 UIU/ML (ref 0.38–5.33)

## 2019-07-30 RX ORDER — LEVOTHYROXINE SODIUM 175 UG/1
175 TABLET ORAL
Qty: 90 TAB | Refills: 3 | Status: SHIPPED | OUTPATIENT
Start: 2019-07-30 | End: 2020-02-19

## 2019-07-31 NOTE — PROGRESS NOTES
Requested Prescriptions     Signed Prescriptions Disp Refills   • levothyroxine (SYNTHROID) 175 MCG Tab 90 Tab 3     Sig: Take 1 Tab by mouth every day.       TOVA Nieto.

## 2019-08-15 ENCOUNTER — ANTICOAGULATION MONITORING (OUTPATIENT)
Dept: VASCULAR LAB | Facility: MEDICAL CENTER | Age: 54
End: 2019-08-15

## 2019-08-15 DIAGNOSIS — I82.90 VENOUS THROMBOSIS: ICD-10-CM

## 2019-08-15 NOTE — PROGRESS NOTES
Discharged from [facility].  Anticoagulation completed 03/18/2019 by LAURO Bonilla, Clinical Pharmacist, CDE, CACP

## 2019-11-05 ENCOUNTER — OFFICE VISIT (OUTPATIENT)
Dept: URGENT CARE | Facility: PHYSICIAN GROUP | Age: 54
End: 2019-11-05
Payer: COMMERCIAL

## 2019-11-05 VITALS
HEART RATE: 90 BPM | BODY MASS INDEX: 50.02 KG/M2 | SYSTOLIC BLOOD PRESSURE: 122 MMHG | HEIGHT: 64 IN | DIASTOLIC BLOOD PRESSURE: 78 MMHG | OXYGEN SATURATION: 96 % | WEIGHT: 293 LBS | TEMPERATURE: 98.5 F | RESPIRATION RATE: 20 BRPM

## 2019-11-05 DIAGNOSIS — J06.9 VIRAL URI WITH COUGH: ICD-10-CM

## 2019-11-05 DIAGNOSIS — J32.9 RHINOSINUSITIS: ICD-10-CM

## 2019-11-05 PROCEDURE — 99214 OFFICE O/P EST MOD 30 MIN: CPT | Performed by: PHYSICIAN ASSISTANT

## 2019-11-05 RX ORDER — ALBUTEROL SULFATE 90 UG/1
2 AEROSOL, METERED RESPIRATORY (INHALATION) EVERY 6 HOURS PRN
Qty: 8.5 G | Refills: 0 | Status: SHIPPED | OUTPATIENT
Start: 2019-11-05 | End: 2021-07-22

## 2019-11-05 RX ORDER — BENZONATATE 100 MG/1
100 CAPSULE ORAL 3 TIMES DAILY PRN
Qty: 60 CAP | Refills: 0 | Status: SHIPPED | OUTPATIENT
Start: 2019-11-05 | End: 2020-02-19

## 2019-11-05 ASSESSMENT — ENCOUNTER SYMPTOMS
WHEEZING: 0
SPUTUM PRODUCTION: 0
COUGH: 1
FEVER: 0
CHILLS: 0
SHORTNESS OF BREATH: 1
SORE THROAT: 0

## 2019-11-05 NOTE — PROGRESS NOTES
"  Subjective:   Antonella Frausto is a 54 y.o. female who presents today with   Chief Complaint   Patient presents with   • Cough     congestion x 7 days       Cough   This is a new problem. The current episode started in the past 7 days. The problem has been unchanged. The problem occurs every few minutes. The cough is non-productive. Associated symptoms include shortness of breath. Pertinent negatives include no chills, fever, sore throat or wheezing. Treatments tried: cough drops. The treatment provided mild relief. Her past medical history is significant for bronchitis.     PMH:  has a past medical history of Arthritis, Bronchitis (12-15-14), Hypothyroidism, Increased BMI, and Pain (12-22-14).  MEDS:   Current Outpatient Medications:   •  albuterol 108 (90 Base) MCG/ACT Aero Soln inhalation aerosol, Inhale 2 Puffs by mouth every 6 hours as needed for Shortness of Breath., Disp: 8.5 g, Rfl: 0  •  benzonatate (TESSALON) 100 MG Cap, Take 1 Cap by mouth 3 times a day as needed for Cough., Disp: 60 Cap, Rfl: 0  •  levothyroxine (SYNTHROID) 175 MCG Tab, Take 1 Tab by mouth every day., Disp: 90 Tab, Rfl: 3  •  Cholecalciferol (VITAMIN D3) 2000 UNIT CAPS, Take 4,000 Units by mouth every day., Disp: , Rfl:   •  cetirizine (ZYRTEC) 10 MG TABS, Take 10 mg by mouth every day., Disp: , Rfl:   •  Evening Primrose OIL, Take 1 Tab by mouth every morning., Disp: , Rfl:   ALLERGIES:   Allergies   Allergen Reactions   • Asa [Aspirin] Vomiting and Nausea   • Codeine Vomiting and Nausea   • Latex      \"irritates my skin\"     SURGHX:   Past Surgical History:   Procedure Laterality Date   • KNEE ARTHROSCOPY  12/29/2014    Performed by Chas Titus M.D. at SURGERY SAME DAY [facility]   • MEDIAL MENISCECTOMY  12/29/2014    Performed by Chas Titus M.D. at SURGERY SAME DAY [facility]   • CHONDROPLASTY  12/29/2014    Performed by Chas Titus M.D. at SURGERY SAME DAY [facility]   • THYROIDECTOMY TOTAL  10/20/2010    Performed " "by SHABBIR BROOKE at SURGERY SAME DAY [facility]   • NODE BIOPSY  10/20/2010    Performed by SHABBIR BROOKE at SURGERY SAME DAY [facility]   • CHOLECYSTECTOMY  1995   • GYN SURGERY      D & C with miscarrage      SOCHX:  reports that she has never smoked. She has never used smokeless tobacco. She reports that she does not drink alcohol or use drugs.  FH: Reviewed with patient, not pertinent to this visit.       Review of Systems   Constitutional: Negative for chills and fever.   HENT: Positive for congestion. Negative for sore throat.    Respiratory: Positive for cough and shortness of breath. Negative for sputum production and wheezing.    All other systems reviewed and are negative.       Objective:   /78 (BP Location: Left arm, Patient Position: Sitting, BP Cuff Size: Adult)   Pulse 90   Temp 36.9 °C (98.5 °F) (Temporal)   Resp 20   Ht 1.626 m (5' 4\")   Wt (!) 134.7 kg (297 lb)   SpO2 96%   BMI 50.98 kg/m²   Physical Exam  Vitals signs and nursing note reviewed.   Constitutional:       General: She is not in acute distress.     Appearance: Normal appearance. She is well-developed. She is not ill-appearing.   HENT:      Head: Normocephalic and atraumatic.      Right Ear: Hearing, tympanic membrane and ear canal normal.      Left Ear: Hearing, tympanic membrane and ear canal normal.      Nose: Rhinorrhea present.      Mouth/Throat:      Mouth: Mucous membranes are moist.   Eyes:      Pupils: Pupils are equal, round, and reactive to light.   Cardiovascular:      Rate and Rhythm: Normal rate and regular rhythm.      Heart sounds: Normal heart sounds.   Pulmonary:      Effort: Pulmonary effort is normal. No respiratory distress.      Breath sounds: Normal breath sounds. No wheezing, rhonchi or rales.   Musculoskeletal:      Comments: Normal movement in all 4 extremities   Skin:     General: Skin is warm and dry.      Capillary Refill: Capillary refill takes less than 2 seconds.   Neurological: "      Mental Status: She is alert.      Coordination: Coordination normal.   Psychiatric:         Mood and Affect: Mood normal.       Assessment/Plan:   Assessment    1. Viral URI with cough  - albuterol 108 (90 Base) MCG/ACT Aero Soln inhalation aerosol; Inhale 2 Puffs by mouth every 6 hours as needed for Shortness of Breath.  Dispense: 8.5 g; Refill: 0  - benzonatate (TESSALON) 100 MG Cap; Take 1 Cap by mouth 3 times a day as needed for Cough.  Dispense: 60 Cap; Refill: 0    2. Rhinosinusitis  Antibiotics are not indicated at this time. Discussed likely viral in nature.   Differential diagnosis, natural history, supportive care, and indications for immediate follow-up discussed.   Patient given instructions and understanding of medications and treatment.    If not improving in 3-5 days, F/U with PCP or return to  if symptoms worsen.    Patient agreeable to plan.      Please note that this dictation was created using voice recognition software. I have made every reasonable attempt to correct obvious errors, but I expect that there are errors of grammar and possibly content that I did not discover before finalizing the note.    Carloz Perez PA-C

## 2019-11-09 ENCOUNTER — OFFICE VISIT (OUTPATIENT)
Dept: URGENT CARE | Facility: PHYSICIAN GROUP | Age: 54
End: 2019-11-09
Payer: COMMERCIAL

## 2019-11-09 VITALS
WEIGHT: 293 LBS | HEIGHT: 64 IN | TEMPERATURE: 98.5 F | OXYGEN SATURATION: 96 % | DIASTOLIC BLOOD PRESSURE: 74 MMHG | RESPIRATION RATE: 20 BRPM | HEART RATE: 98 BPM | SYSTOLIC BLOOD PRESSURE: 110 MMHG | BODY MASS INDEX: 50.02 KG/M2

## 2019-11-09 DIAGNOSIS — J06.9 UPPER RESPIRATORY TRACT INFECTION, UNSPECIFIED TYPE: ICD-10-CM

## 2019-11-09 DIAGNOSIS — R05.9 COUGH: ICD-10-CM

## 2019-11-09 DIAGNOSIS — J98.01 BRONCHOSPASM: ICD-10-CM

## 2019-11-09 PROCEDURE — 99214 OFFICE O/P EST MOD 30 MIN: CPT | Performed by: PHYSICIAN ASSISTANT

## 2019-11-09 RX ORDER — METHYLPREDNISOLONE 4 MG/1
TABLET ORAL
Qty: 21 TAB | Refills: 0 | Status: SHIPPED | OUTPATIENT
Start: 2019-11-09 | End: 2019-11-26

## 2019-11-09 ASSESSMENT — ENCOUNTER SYMPTOMS
SPUTUM PRODUCTION: 0
VOMITING: 0
WHEEZING: 0
COUGH: 1
NAUSEA: 0
ABDOMINAL PAIN: 0
DIARRHEA: 0
SORE THROAT: 1
SHORTNESS OF BREATH: 0
FEVER: 0
CHILLS: 0

## 2019-11-09 NOTE — PROGRESS NOTES
"Subjective:   Antonella Frausto  is a 54 y.o. female who presents for Cough (Pt report sore throat, fatigue, post nasal drip, head aches. onset 2 weeks. )        Cough   This is a new problem. The current episode started in the past 7 days. Associated symptoms include a sore throat. Pertinent negatives include no chills, ear pain, fever, rash, shortness of breath or wheezing.     Patient returns to clinic 4 days status post urgent care evaluation during which she was diagnosed with a viral upper respiratory infection.  She notes cough has become more persistent since last evaluation.  She notes some mild bronchospasm and pain with coughing as well.  Had a near posttussive emesis episode secondary to coughing.  Denies fevers chills.  Denies ear pain.  Notes some mild irritation of throat secondary to persistent coughing.  Denies nausea vomiting outside of coughing episodes.  Denies past medical history of asthma but has needed MDI nebulizers with past episodes of lower respiratory tract infection.  She notes past medical history of bronchitis but states this does not feel that bad currently. Tried OTC cough suppressant but ineffective.    Review of Systems   Constitutional: Negative for chills and fever.   HENT: Positive for congestion and sore throat. Negative for ear pain.    Respiratory: Positive for cough. Negative for sputum production, shortness of breath and wheezing.    Gastrointestinal: Negative for abdominal pain, diarrhea, nausea and vomiting.   Skin: Negative for rash.     Allergies   Allergen Reactions   • Asa [Aspirin] Vomiting and Nausea   • Codeine Vomiting and Nausea   • Latex      \"irritates my skin\"      Objective:   /74 (BP Location: Left arm, Patient Position: Sitting, BP Cuff Size: Large adult)   Pulse 98   Temp 36.9 °C (98.5 °F) (Temporal)   Resp 20   Ht 1.626 m (5' 4\")   Wt (!) 134.3 kg (296 lb)   SpO2 96%   BMI 50.81 kg/m²   Physical Exam  Vitals signs and nursing note reviewed. "   Constitutional:       General: She is not in acute distress.     Appearance: She is well-developed. She is not diaphoretic.   HENT:      Head: Normocephalic and atraumatic.      Right Ear: Tympanic membrane, ear canal and external ear normal.      Left Ear: Tympanic membrane, ear canal and external ear normal.      Nose: Nose normal.      Mouth/Throat:      Pharynx: Uvula midline. Posterior oropharyngeal erythema ( mild PND) present. No oropharyngeal exudate.      Tonsils: No tonsillar abscesses.   Eyes:      General: Lids are normal. No scleral icterus.        Right eye: No discharge.         Left eye: No discharge.      Conjunctiva/sclera: Conjunctivae normal.   Neck:      Musculoskeletal: Neck supple.   Pulmonary:      Effort: Pulmonary effort is normal. No accessory muscle usage or respiratory distress.      Breath sounds: Rhonchi (mild) present. No decreased breath sounds, wheezing or rales.   Musculoskeletal: Normal range of motion.   Lymphadenopathy:      Cervical: Cervical adenopathy ( mild bilat) present.   Skin:     General: Skin is warm and dry.      Coloration: Skin is not pale.      Findings: No erythema.   Neurological:      Mental Status: She is alert and oriented to person, place, and time. She is not disoriented.   Psychiatric:         Speech: Speech normal.         Behavior: Behavior normal.           Assessment/Plan:   1. Cough  - Hydrocod Polst-CPM Polst ER (TUSSIONEX) 10-8 MG/5ML Suspension Extended Release; Take 5 mL by mouth every 12 hours for 7 days.  Dispense: 70 mL; Refill: 0    2. Bronchospasm  - methylPREDNISolone (MEDROL DOSEPAK) 4 MG Tablet Therapy Pack; Follow schedule on package instructions.  Dispense: 21 Tab; Refill: 0    3. Upper respiratory tract infection, unspecified type  Supportive care is reviewed with patient/caregiver - recommend to push PO fluids and electrolytes, OTC tylenol, netti pot/saline irrig, humidifier in home, flonase, ponaris, antihistamine, will start  corticosteroids secondary to bronchospasm, Cautioned regarding potential side effects of steroid, avoid nsaids while using  Cautioned regarding potential for sedation with medication.    I discuss w/ patient if she is worsening over next 1wk to come to clinic - ok to message me first and will call back - Return to clinic with lack of resolution or progression of symptoms.        Differential diagnosis, natural history, supportive care, and indications for immediate follow-up discussed.

## 2019-11-12 ENCOUNTER — OFFICE VISIT (OUTPATIENT)
Dept: MEDICAL GROUP | Facility: PHYSICIAN GROUP | Age: 54
End: 2019-11-12
Payer: COMMERCIAL

## 2019-11-12 VITALS
TEMPERATURE: 97.9 F | DIASTOLIC BLOOD PRESSURE: 84 MMHG | BODY MASS INDEX: 50.02 KG/M2 | HEIGHT: 64 IN | SYSTOLIC BLOOD PRESSURE: 134 MMHG | OXYGEN SATURATION: 94 % | HEART RATE: 84 BPM | WEIGHT: 293 LBS

## 2019-11-12 DIAGNOSIS — R06.02 SHORTNESS OF BREATH: ICD-10-CM

## 2019-11-12 DIAGNOSIS — I82.90 VENOUS THROMBOSIS: ICD-10-CM

## 2019-11-12 DIAGNOSIS — M19.079 OSTEOARTHRITIS OF FOOT, UNSPECIFIED LATERALITY, UNSPECIFIED OSTEOARTHRITIS TYPE: ICD-10-CM

## 2019-11-12 DIAGNOSIS — E78.5 DYSLIPIDEMIA: ICD-10-CM

## 2019-11-12 DIAGNOSIS — E55.9 VITAMIN D DEFICIENCY: ICD-10-CM

## 2019-11-12 DIAGNOSIS — E89.0 POSTOPERATIVE HYPOTHYROIDISM: ICD-10-CM

## 2019-11-12 DIAGNOSIS — E66.01 MORBID OBESITY WITH BMI OF 50.0-59.9, ADULT (HCC): ICD-10-CM

## 2019-11-12 DIAGNOSIS — Z12.31 ENCOUNTER FOR SCREENING MAMMOGRAM FOR BREAST CANCER: ICD-10-CM

## 2019-11-12 DIAGNOSIS — J30.9 CHRONIC ALLERGIC RHINITIS: ICD-10-CM

## 2019-11-12 DIAGNOSIS — Z12.12 SCREENING FOR COLORECTAL CANCER: ICD-10-CM

## 2019-11-12 DIAGNOSIS — Z12.11 SCREENING FOR COLORECTAL CANCER: ICD-10-CM

## 2019-11-12 PROBLEM — M79.604 PAIN OF RIGHT LOWER EXTREMITY: Status: RESOLVED | Noted: 2018-08-14 | Resolved: 2019-11-12

## 2019-11-12 PROCEDURE — 99214 OFFICE O/P EST MOD 30 MIN: CPT | Performed by: NURSE PRACTITIONER

## 2019-11-12 NOTE — ASSESSMENT & PLAN NOTE
Chronic, ongoing.    Is taking vitamin d supplement daily.   Due for updated lab work in February 2020.

## 2019-11-12 NOTE — ASSESSMENT & PLAN NOTE
This is a new problem to the examiner.  Chronic problem for the patient that is ongoing.  Patient reports that his shortness of breath has become noticeable and is worse with activity.  Patient has not been diagnosed with asthma in the past.  Recently prescribed albuterol inhaler for upper respiratory infection.  Patient does not want further work-up at this time.

## 2019-11-12 NOTE — ASSESSMENT & PLAN NOTE
Chronic, ongoing.  Currently taking levothyroxine 175 Mcg daily as prescribed.   Due for updated lab work in February 2020.  Denies symptoms including mood changes, anxiety/depression, chest pain/pressure, palpitations, fatigue, heat/cold intolerance, polydipsia, polyuria, diarrhea/constipation, abdominal pain, unexpected weight change, skin changes, hair thickening/coarsening/falling out/thinning, brittle nails, or edema.   4/28/2014 10:14 9/28/2016 10:10 10/7/2016 11:20 4/19/2018 12:11 7/29/2019 14:34   TSH 0.620 3.840 (H) 2.520 3.030 5.870 (H)   Free T-4 1.01  1.14 0.97 1.26   T3,Free     3.16

## 2019-11-12 NOTE — ASSESSMENT & PLAN NOTE
This is a new problem to the examiner.  Chronic problem for the patient that is ongoing.  Patient reports that she has bilateral foot pain related to arthritis that interferes with her ability to exercise.

## 2019-11-12 NOTE — ASSESSMENT & PLAN NOTE
This is a new problem to the examiner.  Resolved per patient.  Patient states that she was diagnosed with a DVT in her right lower extremity for which she took Xarelto for 6 months.  Patient is no longer taking Xarelto, but does continue aspirin 81 mg/day.  Denies any right lower extremity pain, swelling, redness.

## 2019-11-12 NOTE — ASSESSMENT & PLAN NOTE
"This is a new problem to the examiner.  Chronic problem for the patient that is uncontrolled.  The patient's weight today is 296 pounds with a BMI of 50.81%.  Patient states that chronic right knee pain and bilateral foot pain interferes with her ability to exercise.  Reports that her diet \"could be better\", but it is difficult because her has been \"fights healthy food\".  "

## 2019-11-12 NOTE — ASSESSMENT & PLAN NOTE
This is a new problem to the examiner.  Chronic problem for the patient that is ongoing and managed with over-the-counter Zyrtec 10 mg daily.  Patient reports that symptoms are well controlled with medication.

## 2019-11-12 NOTE — ASSESSMENT & PLAN NOTE
"Chronic, stable.   Not taking any cholesterol lowering medications.  Reports diet is \" good\".  She is not exercising regularly.  She is taking ASA daily.  She denies dizziness, claudication, or chest pain.  Due for updated lab work in February 2020.   1/29/2013 09:21 2/1/2014 07:57 5/23/2018 09:29 7/29/2019 14:34   Cholesterol,Tot 147 125 148 148   Triglycerides 84 106 92 104   HDL 33 (A) 32 (A) 37 (A) 37 (A)   LDL 97 72 93 90     "

## 2019-11-13 NOTE — PROGRESS NOTES
"CC:   Chief Complaint   Patient presents with   • Establish Care     HISTORY OF THE PRESENT ILLNESS: Patient is a 54 y.o. female. This pleasant patient is here today to establish care and discuss multiple issues as listed below.    Health Maintenance: Completed    Dyslipidemia  Chronic, stable.   Not taking any cholesterol lowering medications.  Reports diet is \" good\".  She is not exercising regularly.  She is taking ASA daily.  She denies dizziness, claudication, or chest pain.  Due for updated lab work in February 2020.   1/29/2013 09:21 2/1/2014 07:57 5/23/2018 09:29 7/29/2019 14:34   Cholesterol,Tot 147 125 148 148   Triglycerides 84 106 92 104   HDL 33 (A) 32 (A) 37 (A) 37 (A)   LDL 97 72 93 90       Morbid obesity with BMI of 50.0-59.9, adult (HCC)  This is a new problem to the examiner.  Chronic problem for the patient that is uncontrolled.  The patient's weight today is 296 pounds with a BMI of 50.81%.  Patient states that chronic right knee pain and bilateral foot pain interferes with her ability to exercise.  Reports that her diet \"could be better\", but it is difficult because her has been \"fights healthy food\".    Postoperative hypothyroidism  Chronic, ongoing.  Currently taking levothyroxine 175 Mcg daily as prescribed.   Due for updated lab work in February 2020.  Denies symptoms including mood changes, anxiety/depression, chest pain/pressure, palpitations, fatigue, heat/cold intolerance, polydipsia, polyuria, diarrhea/constipation, abdominal pain, unexpected weight change, skin changes, hair thickening/coarsening/falling out/thinning, brittle nails, or edema.   4/28/2014 10:14 9/28/2016 10:10 10/7/2016 11:20 4/19/2018 12:11 7/29/2019 14:34   TSH 0.620 3.840 (H) 2.520 3.030 5.870 (H)   Free T-4 1.01  1.14 0.97 1.26   T3,Free     3.16       Vitamin D deficiency  Chronic, ongoing.    Is taking vitamin d supplement daily.   Due for updated lab work in February 2020.    Chronic allergic rhinitis  This is a " new problem to the examiner.  Chronic problem for the patient that is ongoing and managed with over-the-counter Zyrtec 10 mg daily.  Patient reports that symptoms are well controlled with medication.    Osteoarthritis of foot  This is a new problem to the examiner.  Chronic problem for the patient that is ongoing.  Patient reports that she has bilateral foot pain related to arthritis that interferes with her ability to exercise.    Venous thrombosis  This is a new problem to the examiner.  Resolved per patient.  Patient states that she was diagnosed with a DVT in her right lower extremity for which she took Xarelto for 6 months.  Patient is no longer taking Xarelto, but does continue aspirin 81 mg/day.  Denies any right lower extremity pain, swelling, redness.    Shortness of breath  This is a new problem to the examiner.  Chronic problem for the patient that is ongoing.  Patient reports that his shortness of breath has become noticeable and is worse with activity.  Patient has not been diagnosed with asthma in the past.  Recently prescribed albuterol inhaler for upper respiratory infection.  Patient does not want further work-up at this time.    Allergies: Asa [aspirin]; Codeine; and Latex  Current Outpatient Medications Ordered in Epic   Medication Sig Dispense Refill   • aspirin EC (ECOTRIN) 81 MG Tablet Delayed Response Take 81 mg by mouth every day.     • Hydrocod Polst-CPM Polst ER (TUSSIONEX) 10-8 MG/5ML Suspension Extended Release Take 5 mL by mouth every 12 hours for 7 days. 70 mL 0   • methylPREDNISolone (MEDROL DOSEPAK) 4 MG Tablet Therapy Pack Follow schedule on package instructions. 21 Tab 0   • albuterol 108 (90 Base) MCG/ACT Aero Soln inhalation aerosol Inhale 2 Puffs by mouth every 6 hours as needed for Shortness of Breath. 8.5 g 0   • benzonatate (TESSALON) 100 MG Cap Take 1 Cap by mouth 3 times a day as needed for Cough. 60 Cap 0   • levothyroxine (SYNTHROID) 175 MCG Tab Take 1 Tab by mouth every  day. 90 Tab 3   • Cholecalciferol (VITAMIN D3) 2000 UNIT CAPS Take 4,000 Units by mouth every day.     • cetirizine (ZYRTEC) 10 MG TABS Take 10 mg by mouth every day.     • Evening Primrose OIL Take 1 Tab by mouth every morning.       No current [facility]-ordered facility-administered medications on file.      Past Medical History:   Diagnosis Date   • Arthritis     FEET & knees   • Bronchitis 12-15-14    on antibiotic   • Hypothyroidism    • Increased BMI    • Pain 12-22-14    right leg, feet, 4/10   • Venous thrombosis 8/16/2018     Past Surgical History:   Procedure Laterality Date   • KNEE ARTHROSCOPY  12/29/2014    Performed by Chas Titus M.D. at SURGERY SAME DAY [facility] ORS   • MEDIAL MENISCECTOMY  12/29/2014    Performed by Chas Titus M.D. at SURGERY SAME DAY [facility] ORS   • CHONDROPLASTY  12/29/2014    Performed by Chas Titus M.D. at SURGERY SAME DAY [facility] ORS   • THYROIDECTOMY TOTAL  10/20/2010    Performed by SHABBIR BROOKE at SURGERY SAME DAY [facility] ORS   • NODE BIOPSY  10/20/2010    Performed by SHABBIR BROOKE at SURGERY SAME DAY [facility] ORS   • CHOLECYSTECTOMY  1995   • GYN SURGERY      D & C with miscarrage      Social History     Tobacco Use   • Smoking status: Never Smoker   • Smokeless tobacco: Never Used   Substance Use Topics   • Alcohol use: No   • Drug use: No     Social History     Patient does not qualify to have social determinant information on file (likely too young).   Social History Narrative   • Not on file     Family History   Problem Relation Age of Onset   • Cancer Mother         Thyroid ca, lymphoma   • Thyroid Mother    • Heart Disease Father    • Heart Failure Father    • No Known Problems Sister    • Heart Failure Brother    • Cancer Maternal Grandmother         Cervical Cancer   • No Known Problems Maternal Grandfather    • Dementia Paternal Grandmother    • Parkinson's Disease Paternal Grandmother    • Cancer Paternal Grandfather         Throat and liver   •  "Arthritis Brother         psoriatic   • DVT Brother    • Psoriasis Brother    • Heart Disease Brother      ROS:   Constitutional:  Negative for fever, chills, unexpected weight change, night sweats, body aches, sleep issues, and fatigue/generalized weakness.   HEENT: Positive for chronic allergic rhinitis.  Negative for headaches, vision changes, hearing changes, ear pain, tinnitus, ear discharge, sinus congestion, sneezing, sore throat, and neck pain.    Respiratory: Positive for dyspnea on exertion/shortness of breath.  Negative for cough, sputum production, hemoptysis, chest congestion, wheezing, and crackles.    Cardiovascular: Positive for dyspnea on exertion/shortness of breath.  Negative for chest pain, palpitations, paroxsymal nocturnal dyspnea, orthopnea, and bilateral lower extremity edema.   Gastrointestinal: Negative for heartburn, nausea, vomiting, abdominal pain, hematochezia, melena, diarrhea, constipation, and greasy/foul-smelling stools.   Genitourinary:  Negative for dysuria, nocturia, polyuria, hematuria, pyuria, urinary urgency, urinary frequency, and urinary incontinence.   Musculoskeletal: Positive for bilateral foot pain related to arthritis and right knee pain.  Negative for myalgias, back pain.   Skin:  Negative for rash, sores, lumps, itching, cyanotic skin color change.   Neurological:  Negative for dizziness, tingling, tremors, focal sensory deficit, focal weakness and headaches.   Endo/Heme/Allergies: Positive for allergies.  Does not bruise/bleed easily. Denies cold/heat intolerance.   Psychiatric/Behavioral: Negative for depression, suicidal/homicidal ideation and memory loss.        Exam: /84 (BP Location: Left arm, Patient Position: Sitting, BP Cuff Size: Large adult)   Pulse 84   Temp 36.6 °C (97.9 °F) (Temporal)   Ht 1.626 m (5' 4\")   Wt (!) 134.3 kg (296 lb)   SpO2 94%  Body mass index is 50.81 kg/m².    General:  Normal appearing. No distress.  HEENT:  Normocephalic. " Eyes conjunctiva clear lids without ptosis, pupils equal and reactive to light accommodation, ears normal shape and contour, canals are clear bilaterally, tympanic membranes are benign, nasal mucosa benign, oropharynx is without erythema, edema or exudates.  Neck:  Supple without JVD or bruit.  Pulmonary: Diminished, clear to ausculation.  Normal effort. No rales, rhonchi, or wheezing.  Cardiovascular:  Regular rate and rhythm without murmur. Carotid and radial pulses are intact and equal bilaterally.  Abdomen:  Soft, non tender, non distended. Normal bowel sounds.  Neurologic:  Grossly non focal.  Lymph:  No cervical, supraclavicular lymph nodes are palpable.  Skin:  Warm and dry.  No obvious lesions.  Musculoskeletal:  Normal gait. No extremity cyanosis, clubbing, or edema.  Psych:  Normal mood and affect. Alert and oriented x3. Judgment and insight is normal.    Assessment/Plan:  1. Morbid obesity with BMI of 50.0-59.9, adult ([facility])  Recommended healthy diet, exercise, weight loss.  Patient declines referral to [facility].  Due for updated labs in February 2020.  - HEMOGLOBIN A1C; Future  - Comp Metabolic Panel; Future    2. Postoperative hypothyroidism  Continue levothyroxine's 175 mcg/day, does not need a refill at this time.  Due for updated labs in February 2020.  - TSH WITH REFLEX TO FT4; Future    3. Dyslipidemia  Chronic, stable.  Due for updated lipid profile in February 2020.  - Lipid Profile; Future    4. Vitamin D deficiency  Continue over-the-counter vitamin D supplement daily.  Due for updated labs in February 2020.  - VITAMIN D,25 HYDROXY; Future    5. Chronic allergic rhinitis  Recommend over-the-counter allergy medication such as Flonase nasal spray.  Continue over-the-counter Zyrtec daily.  - CBC WITHOUT DIFFERENTIAL; Future    6. Shortness of breath  Declines further work-up at this time.  Recommend using albuterol inhaler as needed.    7. Osteoarthritis of foot,  unspecified laterality, unspecified osteoarthritis type  Chronic, ongoing.  Patient declines referral to [facility] or physical therapy.    8. Venous thrombosis  Resolved.  Monitor for signs and symptoms of DVT as they appeared in the past.  Continue aspirin 81 mg/day.    9. Encounter for screening mammogram for breast cancer  - MA-SCREENING MAMMO BILAT W/TOMOSYNTHESIS W/CAD; Future    10. Screening for colorectal cancer  - REFERRAL TO GI FOR COLONOSCOPY    Educated in proper administration of medication(s) ordered today including safety, possible SE, risks, benefits, rationale and alternatives to therapy.   Supportive care, differential diagnoses, and indications for immediate follow-up discussed with patient.    Pathogenesis of diagnosis discussed including typical length and natural progression.    Instructed to return to clinic or nearest emergency department for any change in condition, further concerns, or worsening of symptoms.  Patient states understanding of the plan of care and discharge instructions.    Return in about 3 months (around 2/3/2020) for Follow up Labs.    Please note that this dictation was created using voice recognition software. I have made every reasonable attempt to correct obvious errors, but I expect that there are errors of grammar and possibly content that I did not discover before finalizing the note.

## 2019-11-26 ENCOUNTER — HOSPITAL ENCOUNTER (OUTPATIENT)
Dept: LAB | Facility: MEDICAL CENTER | Age: 54
End: 2019-11-26
Attending: NURSE PRACTITIONER
Payer: COMMERCIAL

## 2019-11-26 ENCOUNTER — OFFICE VISIT (OUTPATIENT)
Dept: URGENT CARE | Facility: PHYSICIAN GROUP | Age: 54
End: 2019-11-26
Payer: COMMERCIAL

## 2019-11-26 ENCOUNTER — HOSPITAL ENCOUNTER (OUTPATIENT)
Dept: RADIOLOGY | Facility: MEDICAL CENTER | Age: 54
End: 2019-11-26
Attending: NURSE PRACTITIONER
Payer: COMMERCIAL

## 2019-11-26 VITALS
HEART RATE: 72 BPM | SYSTOLIC BLOOD PRESSURE: 128 MMHG | OXYGEN SATURATION: 92 % | BODY MASS INDEX: 50.02 KG/M2 | HEIGHT: 64 IN | RESPIRATION RATE: 14 BRPM | WEIGHT: 293 LBS | TEMPERATURE: 96.7 F | DIASTOLIC BLOOD PRESSURE: 82 MMHG

## 2019-11-26 DIAGNOSIS — R05.9 COUGH: ICD-10-CM

## 2019-11-26 DIAGNOSIS — J22 LOWER RESP. TRACT INFECTION: ICD-10-CM

## 2019-11-26 LAB
ANION GAP SERPL CALC-SCNC: 7 MMOL/L (ref 0–11.9)
BASOPHILS # BLD AUTO: 0.9 % (ref 0–1.8)
BASOPHILS # BLD: 0.07 K/UL (ref 0–0.12)
BUN SERPL-MCNC: 15 MG/DL (ref 8–22)
CALCIUM SERPL-MCNC: 9.5 MG/DL (ref 8.5–10.5)
CHLORIDE SERPL-SCNC: 108 MMOL/L (ref 96–112)
CO2 SERPL-SCNC: 28 MMOL/L (ref 20–33)
CREAT SERPL-MCNC: 0.79 MG/DL (ref 0.5–1.4)
D DIMER PPP IA.FEU-MCNC: 0.44 UG/ML (FEU) (ref 0–0.5)
EOSINOPHIL # BLD AUTO: 0.33 K/UL (ref 0–0.51)
EOSINOPHIL NFR BLD: 4 % (ref 0–6.9)
ERYTHROCYTE [DISTWIDTH] IN BLOOD BY AUTOMATED COUNT: 44.8 FL (ref 35.9–50)
GLUCOSE SERPL-MCNC: 64 MG/DL (ref 65–99)
HCT VFR BLD AUTO: 43.3 % (ref 37–47)
HGB BLD-MCNC: 13.8 G/DL (ref 12–16)
IMM GRANULOCYTES # BLD AUTO: 0.03 K/UL (ref 0–0.11)
IMM GRANULOCYTES NFR BLD AUTO: 0.4 % (ref 0–0.9)
LYMPHOCYTES # BLD AUTO: 2 K/UL (ref 1–4.8)
LYMPHOCYTES NFR BLD: 24.4 % (ref 22–41)
MCH RBC QN AUTO: 27.9 PG (ref 27–33)
MCHC RBC AUTO-ENTMCNC: 31.9 G/DL (ref 33.6–35)
MCV RBC AUTO: 87.5 FL (ref 81.4–97.8)
MONOCYTES # BLD AUTO: 0.76 K/UL (ref 0–0.85)
MONOCYTES NFR BLD AUTO: 9.3 % (ref 0–13.4)
NEUTROPHILS # BLD AUTO: 5 K/UL (ref 2–7.15)
NEUTROPHILS NFR BLD: 61 % (ref 44–72)
NRBC # BLD AUTO: 0 K/UL
NRBC BLD-RTO: 0 /100 WBC
PLATELET # BLD AUTO: 349 K/UL (ref 164–446)
PMV BLD AUTO: 9.5 FL (ref 9–12.9)
POTASSIUM SERPL-SCNC: 4.3 MMOL/L (ref 3.6–5.5)
RBC # BLD AUTO: 4.95 M/UL (ref 4.2–5.4)
SODIUM SERPL-SCNC: 143 MMOL/L (ref 135–145)
WBC # BLD AUTO: 8.2 K/UL (ref 4.8–10.8)

## 2019-11-26 PROCEDURE — 99214 OFFICE O/P EST MOD 30 MIN: CPT | Performed by: NURSE PRACTITIONER

## 2019-11-26 PROCEDURE — 85379 FIBRIN DEGRADATION QUANT: CPT

## 2019-11-26 PROCEDURE — 80048 BASIC METABOLIC PNL TOTAL CA: CPT

## 2019-11-26 PROCEDURE — 85025 COMPLETE CBC W/AUTO DIFF WBC: CPT

## 2019-11-26 PROCEDURE — 36415 COLL VENOUS BLD VENIPUNCTURE: CPT

## 2019-11-26 PROCEDURE — 71046 X-RAY EXAM CHEST 2 VIEWS: CPT

## 2019-11-26 RX ORDER — DOXYCYCLINE HYCLATE 100 MG
100 TABLET ORAL 2 TIMES DAILY
Qty: 14 TAB | Refills: 0 | Status: SHIPPED | OUTPATIENT
Start: 2019-11-26 | End: 2019-12-03

## 2019-11-26 ASSESSMENT — ENCOUNTER SYMPTOMS
STRIDOR: 0
SORE THROAT: 0
COUGH: 1
CHILLS: 0
PALPITATIONS: 0
BLURRED VISION: 0
VOMITING: 0
HEADACHES: 0
NAUSEA: 0
DIZZINESS: 0
CONSTIPATION: 0
MUSCULOSKELETAL NEGATIVE: 1
DOUBLE VISION: 0
DIARRHEA: 0
WHEEZING: 0
ABDOMINAL PAIN: 0
SHORTNESS OF BREATH: 1
FEVER: 0

## 2019-11-26 NOTE — PROGRESS NOTES
"Subjective:   Antonella Frausto is a 54 y.o. female who presents for Cough ([id number] )        Cough   This is a recurrent problem. The current episode started more than 1 month ago. The problem has been unchanged. The problem occurs every few minutes. The cough is non-productive. Associated symptoms include nasal congestion, postnasal drip and shortness of breath (States this is normal for her). Pertinent negatives include no chest pain, chills, ear pain, fever, headaches, rash, sore throat or wheezing. She has tried OTC cough suppressant for the symptoms. The treatment provided mild relief.   She was told viral a month ago and then returned to the clinic and prescribed Medrol dose pack. No relief of symptoms.    Hx of DVT several months ago. No longer taking blood thinners.    Review of Systems   Constitutional: Negative for chills and fever.   HENT: Positive for congestion and postnasal drip. Negative for ear discharge, ear pain and sore throat.    Eyes: Negative for blurred vision and double vision.   Respiratory: Positive for cough and shortness of breath (States this is normal for her). Negative for wheezing and stridor.    Cardiovascular: Negative for chest pain and palpitations.   Gastrointestinal: Negative for abdominal pain, constipation, diarrhea, nausea and vomiting.   Musculoskeletal: Negative.    Skin: Negative.  Negative for itching and rash.   Neurological: Negative for dizziness and headaches.   All other systems reviewed and are negative.    Patient's PMH, SocHx, SurgHx, FamHx, Drug allergies and medications reviewed.     Objective:   /82   Pulse 72   Temp 35.9 °C (96.7 °F) (Temporal)   Resp 14   Ht 1.626 m (5' 4\")   Wt (!) 136.1 kg (300 lb)   SpO2 92%   BMI 51.49 kg/m²   Physical Exam  Vitals signs reviewed.   Constitutional:       Appearance: She is well-developed.   HENT:      Head: Normocephalic.      Right Ear: Tympanic membrane and ear canal normal. No middle ear effusion. Tympanic " membrane is not perforated or erythematous.      Left Ear: Tympanic membrane and ear canal normal.  No middle ear effusion. Tympanic membrane is not perforated or erythematous.      Nose: Nose normal. No rhinorrhea.      Right Sinus: No maxillary sinus tenderness or frontal sinus tenderness.      Left Sinus: No maxillary sinus tenderness or frontal sinus tenderness.      Mouth/Throat:      Lips: Pink.      Mouth: Mucous membranes are moist.      Pharynx: Uvula midline. Oropharyngeal exudate present. No posterior oropharyngeal erythema or uvula swelling.      Tonsils: No tonsillar exudate.   Eyes:      General: Lids are normal.      Extraocular Movements: Extraocular movements intact.      Conjunctiva/sclera: Conjunctivae normal.      Pupils: Pupils are equal, round, and reactive to light.   Neck:      Musculoskeletal: Normal range of motion.      Thyroid: No thyromegaly.   Cardiovascular:      Rate and Rhythm: Normal rate and regular rhythm.      Heart sounds: Normal heart sounds.   Pulmonary:      Effort: Pulmonary effort is normal. No tachypnea, bradypnea, accessory muscle usage, prolonged expiration or respiratory distress.      Breath sounds: No stridor or decreased air movement. Examination of the right-lower field reveals decreased breath sounds. Decreased breath sounds present. No wheezing.   Lymphadenopathy:      Head:      Right side of head: No submandibular or tonsillar adenopathy.      Left side of head: No submandibular or tonsillar adenopathy.   Skin:     General: Skin is warm and dry.      Findings: No rash.   Neurological:      Mental Status: She is alert and oriented to person, place, and time.   Psychiatric:         Mood and Affect: Mood normal.         Speech: Speech normal.         Behavior: Behavior normal. Behavior is cooperative.         Thought Content: Thought content normal.         Judgment: Judgment normal.           Assessment/Plan:   Assessment    1. Cough  - DX-CHEST-2 VIEWS; Future  -  CBC WITH DIFFERENTIAL; Future  - Basic Metabolic Panel; Future  - D-DIMER; Future    2. Lower resp. tract infection  - doxycycline (VIBRAMYCIN) 100 MG Tab; Take 1 Tab by mouth 2 times a day for 7 days.  Dispense: 14 Tab; Refill: 0    Xray negative  Called and spoke to patient regarding CBC, CMP, and D Dimer results mostly unremarkable. We discussed glucose was 64, which is on the lower side of normal and patient mentioned that she did have toast this morning. Advised to follow up with PCP regarding this. Encouraged to eat smaller, more frequent meals.    Differential diagnosis, natural history, supportive care, and indications for immediate follow-up discussed.     **Please note that all invasive procedures during this visit were performed by myself and/or the Medical Assistant under the supervision of the PA or MD in office**

## 2020-02-11 ENCOUNTER — HOSPITAL ENCOUNTER (OUTPATIENT)
Dept: LAB | Facility: MEDICAL CENTER | Age: 55
End: 2020-02-11
Attending: NURSE PRACTITIONER
Payer: COMMERCIAL

## 2020-02-11 DIAGNOSIS — J30.9 CHRONIC ALLERGIC RHINITIS: ICD-10-CM

## 2020-02-11 DIAGNOSIS — E55.9 VITAMIN D DEFICIENCY: ICD-10-CM

## 2020-02-11 DIAGNOSIS — E78.5 DYSLIPIDEMIA: ICD-10-CM

## 2020-02-11 DIAGNOSIS — E66.01 MORBID OBESITY WITH BMI OF 50.0-59.9, ADULT (HCC): ICD-10-CM

## 2020-02-11 DIAGNOSIS — E89.0 POSTOPERATIVE HYPOTHYROIDISM: ICD-10-CM

## 2020-02-11 LAB
ERYTHROCYTE [DISTWIDTH] IN BLOOD BY AUTOMATED COUNT: 43.8 FL (ref 35.9–50)
HCT VFR BLD AUTO: 42.4 % (ref 37–47)
HGB BLD-MCNC: 13.2 G/DL (ref 12–16)
MCH RBC QN AUTO: 27.2 PG (ref 27–33)
MCHC RBC AUTO-ENTMCNC: 31.1 G/DL (ref 33.6–35)
MCV RBC AUTO: 87.2 FL (ref 81.4–97.8)
PLATELET # BLD AUTO: 333 K/UL (ref 164–446)
PMV BLD AUTO: 9.7 FL (ref 9–12.9)
RBC # BLD AUTO: 4.86 M/UL (ref 4.2–5.4)
WBC # BLD AUTO: 8.1 K/UL (ref 4.8–10.8)

## 2020-02-11 PROCEDURE — 82306 VITAMIN D 25 HYDROXY: CPT

## 2020-02-11 PROCEDURE — 84443 ASSAY THYROID STIM HORMONE: CPT

## 2020-02-11 PROCEDURE — 80061 LIPID PANEL: CPT

## 2020-02-11 PROCEDURE — 84439 ASSAY OF FREE THYROXINE: CPT

## 2020-02-11 PROCEDURE — 85027 COMPLETE CBC AUTOMATED: CPT

## 2020-02-11 PROCEDURE — 80053 COMPREHEN METABOLIC PANEL: CPT

## 2020-02-11 PROCEDURE — 36415 COLL VENOUS BLD VENIPUNCTURE: CPT

## 2020-02-11 PROCEDURE — 83036 HEMOGLOBIN GLYCOSYLATED A1C: CPT

## 2020-02-12 LAB
25(OH)D3 SERPL-MCNC: 48 NG/ML (ref 30–100)
ALBUMIN SERPL BCP-MCNC: 3.9 G/DL (ref 3.2–4.9)
ALBUMIN/GLOB SERPL: 1.4 G/DL
ALP SERPL-CCNC: 79 U/L (ref 30–99)
ALT SERPL-CCNC: 17 U/L (ref 2–50)
ANION GAP SERPL CALC-SCNC: 7 MMOL/L (ref 0–11.9)
AST SERPL-CCNC: 19 U/L (ref 12–45)
BILIRUB SERPL-MCNC: 0.5 MG/DL (ref 0.1–1.5)
BUN SERPL-MCNC: 19 MG/DL (ref 8–22)
CALCIUM SERPL-MCNC: 9.3 MG/DL (ref 8.5–10.5)
CHLORIDE SERPL-SCNC: 105 MMOL/L (ref 96–112)
CHOLEST SERPL-MCNC: 134 MG/DL (ref 100–199)
CO2 SERPL-SCNC: 28 MMOL/L (ref 20–33)
CREAT SERPL-MCNC: 0.78 MG/DL (ref 0.5–1.4)
EST. AVERAGE GLUCOSE BLD GHB EST-MCNC: 123 MG/DL
FASTING STATUS PATIENT QL REPORTED: NORMAL
GLOBULIN SER CALC-MCNC: 2.8 G/DL (ref 1.9–3.5)
GLUCOSE SERPL-MCNC: 99 MG/DL (ref 65–99)
HBA1C MFR BLD: 5.9 % (ref 0–5.6)
HDLC SERPL-MCNC: 35 MG/DL
LDLC SERPL CALC-MCNC: 72 MG/DL
POTASSIUM SERPL-SCNC: 4.4 MMOL/L (ref 3.6–5.5)
PROT SERPL-MCNC: 6.7 G/DL (ref 6–8.2)
SODIUM SERPL-SCNC: 140 MMOL/L (ref 135–145)
T4 FREE SERPL-MCNC: 1.4 NG/DL (ref 0.53–1.43)
TRIGL SERPL-MCNC: 135 MG/DL (ref 0–149)
TSH SERPL DL<=0.005 MIU/L-ACNC: 0.31 UIU/ML (ref 0.38–5.33)

## 2020-02-13 PROBLEM — R73.03 PREDIABETES: Status: ACTIVE | Noted: 2020-02-13

## 2020-02-19 ENCOUNTER — OFFICE VISIT (OUTPATIENT)
Dept: MEDICAL GROUP | Facility: PHYSICIAN GROUP | Age: 55
End: 2020-02-19
Payer: COMMERCIAL

## 2020-02-19 VITALS
DIASTOLIC BLOOD PRESSURE: 90 MMHG | HEART RATE: 96 BPM | BODY MASS INDEX: 50.02 KG/M2 | SYSTOLIC BLOOD PRESSURE: 132 MMHG | HEIGHT: 64 IN | TEMPERATURE: 98 F | OXYGEN SATURATION: 95 % | WEIGHT: 293 LBS

## 2020-02-19 DIAGNOSIS — E66.01 MORBID OBESITY WITH BMI OF 50.0-59.9, ADULT (HCC): ICD-10-CM

## 2020-02-19 DIAGNOSIS — R06.02 SHORTNESS OF BREATH: ICD-10-CM

## 2020-02-19 DIAGNOSIS — R73.03 PREDIABETES: ICD-10-CM

## 2020-02-19 DIAGNOSIS — E78.5 DYSLIPIDEMIA: ICD-10-CM

## 2020-02-19 DIAGNOSIS — Z23 NEED FOR VACCINATION: ICD-10-CM

## 2020-02-19 DIAGNOSIS — E55.9 VITAMIN D DEFICIENCY: ICD-10-CM

## 2020-02-19 DIAGNOSIS — E89.0 POSTOPERATIVE HYPOTHYROIDISM: ICD-10-CM

## 2020-02-19 PROCEDURE — 99214 OFFICE O/P EST MOD 30 MIN: CPT | Performed by: NURSE PRACTITIONER

## 2020-02-19 RX ORDER — LEVOTHYROXINE SODIUM 175 MCG
175 TABLET ORAL
Qty: 90 TAB | Refills: 0 | Status: SHIPPED | OUTPATIENT
Start: 2020-02-19 | End: 2020-05-27 | Stop reason: SDUPTHER

## 2020-02-19 ASSESSMENT — PATIENT HEALTH QUESTIONNAIRE - PHQ9: CLINICAL INTERPRETATION OF PHQ2 SCORE: 0

## 2020-02-19 NOTE — ASSESSMENT & PLAN NOTE
This is a new problem to the examiner.  Chronic problem for the patient that is ongoing.  Not on glucose lowering medications.  Reports that her diet is poor and she is not participating in any exercise.   5/23/2018 09:29 2/11/2020 09:54   Glycohemoglobin 5.9 (H) 5.9 (H)   Estim. Avg Glu 123 123

## 2020-02-19 NOTE — ASSESSMENT & PLAN NOTE
Chronic, ongoing.  Continues over-the-counter vitamin D 4000 units daily.  Due to repeat labs in February 2021.   10/7/2016 11:20 5/23/2018 09:29 7/29/2019 14:34 2/11/2020 09:54   25-Hydroxy   Vitamin D 25 37 29 (L) 20 48

## 2020-02-19 NOTE — ASSESSMENT & PLAN NOTE
Chronic, uncontrolled.    Currently taking levothyroxine 175 mcg daily as prescribed.  Patient is hesitant to decrease dose of levothyroxine.  States that she would like to try brand-name Synthroid at the same dose for 3 months and then recheck labs.  Due for updated lab work in 3 months.  Denies symptoms including mood changes, anxiety/depression, chest pain/pressure, palpitations, fatigue, heat/cold intolerance, polydipsia, polyuria, diarrhea/constipation, abdominal pain, unexpected weight change, skin changes, hair thickening/coarsening/falling out/thinning, brittle nails, or edema.   10/7/2016 11:20 4/19/2018 12:11 7/29/2019 14:34 2/11/2020 09:54   TSH 2.520 3.030 5.870 (H) 0.310 (L)   Free T-4 1.14 0.97 1.26 1.40

## 2020-02-19 NOTE — ASSESSMENT & PLAN NOTE
"Chronic, ongoing.  Not taking any cholesterol lowering medications.  10-year cardiac risk ASCVD score: 4.49%  Reports diet is \" poor\".   She is not following a low-cholesterol diet.  She is not exercising regularly.  She is taking ASA daily.  She denies dizziness, claudication, or chest pain.  Due for updated lab work in February 2021.   5/23/2018 09:29 7/29/2019 14:34 2/11/2020 09:54   Cholesterol,Tot 148 148 134   Triglycerides 92 104 135   HDL 37 (A) 37 (A) 35 (A)   LDL 93 90 72     "

## 2020-02-19 NOTE — PROGRESS NOTES
"CC:   Chief Complaint   Patient presents with   • Lab Results     review     HISTORY OF THE PRESENT ILLNESS: Patient is a 54 y.o. female. This pleasant patient is here today to review lab results.    Health Maintenance: Completed    Prediabetes  This is a new problem to the examiner.  Chronic problem for the patient that is ongoing.  Not on glucose lowering medications.  Reports that her diet is poor and she is not participating in any exercise.   5/23/2018 09:29 2/11/2020 09:54   Glycohemoglobin 5.9 (H) 5.9 (H)   Estim. Avg Glu 123 123       Postoperative hypothyroidism  Chronic, uncontrolled.    Currently taking levothyroxine 175 mcg daily as prescribed.  Patient is hesitant to decrease dose of levothyroxine.  States that she would like to try brand-name Synthroid at the same dose for 3 months and then recheck labs.  Due for updated lab work in 3 months.  Denies symptoms including mood changes, anxiety/depression, chest pain/pressure, palpitations, fatigue, heat/cold intolerance, polydipsia, polyuria, diarrhea/constipation, abdominal pain, unexpected weight change, skin changes, hair thickening/coarsening/falling out/thinning, brittle nails, or edema.   10/7/2016 11:20 4/19/2018 12:11 7/29/2019 14:34 2/11/2020 09:54   TSH 2.520 3.030 5.870 (H) 0.310 (L)   Free T-4 1.14 0.97 1.26 1.40       Dyslipidemia  Chronic, ongoing.  Not taking any cholesterol lowering medications.  10-year cardiac risk ASCVD score: 4.49%  Reports diet is \" poor\".   She is not following a low-cholesterol diet.  She is not exercising regularly.  She is taking ASA daily.  She denies dizziness, claudication, or chest pain.  Due for updated lab work in February 2021.   5/23/2018 09:29 7/29/2019 14:34 2/11/2020 09:54   Cholesterol,Tot 148 148 134   Triglycerides 92 104 135   HDL 37 (A) 37 (A) 35 (A)   LDL 93 90 72       Vitamin D deficiency  Chronic, ongoing.  Continues over-the-counter vitamin D 4000 units daily.  Due to repeat labs in February " "2021.   10/7/2016 11:20 5/23/2018 09:29 7/29/2019 14:34 2/11/2020 09:54   25-Hydroxy   Vitamin D 25 37 29 (L) 31 48       Morbid obesity with BMI of 50.0-59.9, adult (HCC)  Chronic, ongoing and uncontrolled.  Patient reports that her diet is poor and she is not participating in regular physical activity.  States that she tends to eat sweets when she is stressed.  Vitals 11/9/2019 11/12/2019 11/26/2019 2/19/2020   WEIGHT 296 296 300 302   HEIGHT 5' 4\" 5' 4\" 5' 4\" 5' 4\"   BMI 50.81 kg/m2 50.81 kg/m2 51.49 kg/m2 51.84 kg/m2       Shortness of breath  Chronic, ongoing.  Patient continues to report that shortness of breath is more noticeable and does worsen with activity.  Previously declined further work-up, but is now requesting orders for pulmonary function tests.  Does have albuterol rescue inhaler to be used as needed.  Chest x-ray completed in November 2019 was negative.    Allergies: Asa [aspirin]; Codeine; and Latex  Current Outpatient Medications Ordered in Epic   Medication Sig Dispense Refill   • Zoster Vac Recomb Adjuvanted (SHINGRIX) 50 MCG/0.5ML Recon Susp 0.5 mL by Intramuscular route Once for 1 dose. 0.5 mL 0   • SYNTHROID 175 MCG Tab Take 1 Tab by mouth Every morning on an empty stomach. 90 Tab 0   • aspirin EC (ECOTRIN) 81 MG Tablet Delayed Response Take 81 mg by mouth every day.     • albuterol 108 (90 Base) MCG/ACT Aero Soln inhalation aerosol Inhale 2 Puffs by mouth every 6 hours as needed for Shortness of Breath. 8.5 g 0   • Cholecalciferol (VITAMIN D3) 2000 UNIT CAPS Take 4,000 Units by mouth every day.     • cetirizine (ZYRTEC) 10 MG TABS Take 10 mg by mouth every day.     • Evening Primrose OIL Take 1 Tab by mouth every morning.       No current [facility]-ordered facility-administered medications on file.      Past Medical History:   Diagnosis Date   • Arthritis     FEET & knees   • Bronchitis 12-15-14    on antibiotic   • Hypothyroidism    • Increased BMI    • Pain 12-22-14    right leg, feet, 4/10 "   • Venous thrombosis 8/16/2018     Past Surgical History:   Procedure Laterality Date   • KNEE ARTHROSCOPY  12/29/2014    Performed by Chas Titus M.D. at SURGERY SAME DAY [facility]   • MEDIAL MENISCECTOMY  12/29/2014    Performed by Chas Titus M.D. at SURGERY SAME DAY [facility]   • CHONDROPLASTY  12/29/2014    Performed by Chas Titus M.D. at SURGERY SAME DAY [facility]   • THYROIDECTOMY TOTAL  10/20/2010    Performed by SHABBIR BROOKE at SURGERY SAME DAY [facility]   • NODE BIOPSY  10/20/2010    Performed by SHABBIR BROOKE at SURGERY SAME DAY [facility]   • CHOLECYSTECTOMY  1995   • GYN SURGERY      D & C with miscarrage      Social History     Tobacco Use   • Smoking status: Never Smoker   • Smokeless tobacco: Never Used   Substance Use Topics   • Alcohol use: No   • Drug use: No     Social History     Social History Narrative   • Not on file     Family History   Problem Relation Age of Onset   • Cancer Mother         Thyroid ca, lymphoma   • Thyroid Mother    • Heart Disease Father    • Heart Failure Father    • No Known Problems Sister    • Heart Failure Brother    • Cancer Maternal Grandmother         Cervical Cancer   • No Known Problems Maternal Grandfather    • Dementia Paternal Grandmother    • Parkinson's Disease Paternal Grandmother    • Cancer Paternal Grandfather         Throat and liver   • Arthritis Brother         psoriatic   • DVT Brother    • Psoriasis Brother    • Heart Disease Brother      ROS:   Constitutional:  Negative for fever, chills, unexpected weight change, night sweats, body aches, sleep issues, and fatigue/generalized weakness.   HEENT: Positive for chronic allergic rhinitis.  Negative for headaches, vision changes, hearing changes, ear pain, tinnitus, ear discharge, sinus congestion, sneezing, sore throat, and neck pain.    Respiratory: Positive for shortness of breath.  Negative for cough, sputum production, hemoptysis, chest congestion, wheezing, and  "crackles.    Cardiovascular: Positive for dyspnea on exertion/shortness of breath.  Negative for chest pain, palpitations, paroxsymal nocturnal dyspnea, orthopnea, and bilateral lower extremity edema.   Gastrointestinal: Negative for heartburn, nausea, vomiting, abdominal pain, hematochezia, melena, diarrhea, constipation, and greasy/foul-smelling stools.   Genitourinary:  Negative for dysuria, nocturia, polyuria, hematuria, pyuria, urinary urgency, urinary frequency, and urinary incontinence.   Musculoskeletal: Positive for bilateral foot pain related to arthritis and right knee pain.  Negative for myalgias, back pain.   Skin:  Negative for rash, sores, lumps, itching, cyanotic skin color change.   Neurological:  Negative for dizziness, tingling, tremors, focal sensory deficit, focal weakness and headaches.   Endo/Heme/Allergies: Positive for allergies.  Does not bruise/bleed easily. Denies cold/heat intolerance.   Psychiatric/Behavioral: Negative for depression, suicidal/homicidal ideation and memory loss.        Exam: /90 (BP Location: Left arm, Patient Position: Sitting, BP Cuff Size: Large adult)   Pulse 96   Temp 36.7 °C (98 °F) (Temporal)   Ht 1.626 m (5' 4\")   Wt (!) 137 kg (302 lb)   SpO2 95%  Body mass index is 51.84 kg/m².    General: Well nourished, well developed female in NAD, awake and conversant.  Eyes: Normal conjunctiva, anicteric.  Round symmetrical pupils.  ENT: Hearing grossly intact.  No nasal discharge.  Neck: Neck is supple.  No masses or thyromegaly.  CV: No lower extremity edema.  Respiratory: Respirations are nonlabored.  No wheezing.  Abdomen: Non-Distended.  Skin: Warm.  No rashes or ulcers.  MSK: Normal ambulation.  No clubbing or cyanosis.  Neuro: Sensation and CN II-XII grossly normal.  Psych: Alert and oriented.  Cooperative, appropriate mood and affect, normal judgment.    Assessment/Plan:  1. Morbid obesity with BMI of 50.0-59.9, adult (HCC)  Chronic, " uncontrolled.  Encourage healthy diet, exercise, weight loss.  Declines referral to [facility].  Patient's body mass index is 51.84 kg/m². Exercise and nutrition counseling were performed at this visit.    2. Prediabetes  Chronic, ongoing.  Encourage healthy diet, exercise, weight loss.  Declines referral to [facility].  Due to repeat labs in February 2021.    3. Postoperative hypothyroidism  Chronic, uncontrolled.  Patient declines changing dose of levothyroxine, would like to try brand name Synthroid (states that her insurance will cover) at the same dose and recheck TSH in 3 months.  Patient agrees that if repeat TSH in 3 months continues to be too low, we will plan to decrease dose at that time.  - SYNTHROID 175 MCG Tab; Take 1 Tab by mouth Every morning on an empty stomach.  Dispense: 90 Tab; Refill: 0  - TSH WITH REFLEX TO FT4; Future    4. Dyslipidemia  Chronic, ongoing.  Encouraged healthy diet, exercise, weight loss.  Due to repeat labs in February 2021.    5. Vitamin D deficiency  Chronic, ongoing and stable.  Continue over-the-counter vitamin D 4000 units/day.  Due for updated labs in February 2021.    6. Shortness of breath  Chronic, ongoing.  Patient is now requesting pulmonary function tests as her shortness of breath, especially with exertion, is not improving.  - PULMONARY FUNCTION TESTS -Test requested: Complete Pulmonary Function Test; Include MIPS/MEPS? Yes; Future    7. Need for vaccination  - Zoster Vac Recomb Adjuvanted (SHINGRIX) 50 MCG/0.5ML Recon Susp; 0.5 mL by Intramuscular route Once for 1 dose.  Dispense: 0.5 mL; Refill: 0  Patient provided with paper prescription for Shingrix vaccine.  Advised patient to take prescription to pharmacy to check for availability and/or to be put on a waiting list.  Advised the patient that Shingrix is a series of two consecutive vaccines.    Educated in proper administration of medication(s) ordered today  including safety, possible SE, risks, benefits, rationale and alternatives to therapy.   Supportive care, differential diagnoses, and indications for immediate follow-up discussed with patient.    Pathogenesis of diagnosis discussed including typical length and natural progression.    Instructed to return to clinic or nearest emergency department for any change in condition, further concerns, or worsening of symptoms.  Patient states understanding of the plan of care and discharge instructions.    Return in about 3 months (around 5/19/2020) for Thyroid, Follow up Labs.    Please note that this dictation was created using voice recognition software. I have made every reasonable attempt to correct obvious errors, but I expect that there are errors of grammar and possibly content that I did not discover before finalizing the note.

## 2020-02-19 NOTE — ASSESSMENT & PLAN NOTE
Chronic, ongoing.  Patient continues to report that shortness of breath is more noticeable and does worsen with activity.  Previously declined further work-up, but is now requesting orders for pulmonary function tests.  Does have albuterol rescue inhaler to be used as needed.  Chest x-ray completed in November 2019 was negative.

## 2020-02-19 NOTE — ASSESSMENT & PLAN NOTE
"Chronic, ongoing and uncontrolled.  Patient reports that her diet is poor and she is not participating in regular physical activity.  States that she tends to eat sweets when she is stressed.  Vitals 11/9/2019 11/12/2019 11/26/2019 2/19/2020   WEIGHT 296 296 300 302   HEIGHT 5' 4\" 5' 4\" 5' 4\" 5' 4\"   BMI 50.81 kg/m2 50.81 kg/m2 51.49 kg/m2 51.84 kg/m2     "

## 2020-05-26 ENCOUNTER — HOSPITAL ENCOUNTER (OUTPATIENT)
Dept: LAB | Facility: MEDICAL CENTER | Age: 55
End: 2020-05-26
Attending: NURSE PRACTITIONER
Payer: COMMERCIAL

## 2020-05-26 DIAGNOSIS — E89.0 POSTOPERATIVE HYPOTHYROIDISM: ICD-10-CM

## 2020-05-26 LAB — TSH SERPL DL<=0.005 MIU/L-ACNC: 2.15 UIU/ML (ref 0.38–5.33)

## 2020-05-26 PROCEDURE — 36415 COLL VENOUS BLD VENIPUNCTURE: CPT

## 2020-05-26 PROCEDURE — 84443 ASSAY THYROID STIM HORMONE: CPT

## 2020-05-27 DIAGNOSIS — E89.0 POSTOPERATIVE HYPOTHYROIDISM: ICD-10-CM

## 2020-05-27 RX ORDER — LEVOTHYROXINE SODIUM 175 MCG
175 TABLET ORAL
Qty: 90 TAB | Refills: 3 | Status: SHIPPED | OUTPATIENT
Start: 2020-05-27 | End: 2021-04-30

## 2020-05-27 NOTE — TELEPHONE ENCOUNTER
Requested Prescriptions     Pending Prescriptions Disp Refills   • SYNTHROID 175 MCG Tab 90 Tab 3     Sig: Take 1 Tab by mouth Every morning on an empty stomach.       Lynn Rodriguez A.P.R.N.

## 2020-05-27 NOTE — TELEPHONE ENCOUNTER
Received request via: Patient    Was the patient seen in the last year in this department? Yes 2/19/20    Does the patient have an active prescription (recently filled or refills available) for medication(s) requested? No

## 2020-11-10 ENCOUNTER — APPOINTMENT (OUTPATIENT)
Dept: MEDICAL GROUP | Facility: PHYSICIAN GROUP | Age: 55
End: 2020-11-10
Payer: COMMERCIAL

## 2020-11-10 ENCOUNTER — HOSPITAL ENCOUNTER (OUTPATIENT)
Dept: LAB | Facility: MEDICAL CENTER | Age: 55
End: 2020-11-10
Attending: NURSE PRACTITIONER
Payer: COMMERCIAL

## 2020-11-10 DIAGNOSIS — Z11.59 ENCOUNTER FOR SCREENING FOR OTHER VIRAL DISEASES: ICD-10-CM

## 2020-11-10 PROCEDURE — C9803 HOPD COVID-19 SPEC COLLECT: HCPCS

## 2020-11-10 PROCEDURE — U0003 INFECTIOUS AGENT DETECTION BY NUCLEIC ACID (DNA OR RNA); SEVERE ACUTE RESPIRATORY SYNDROME CORONAVIRUS 2 (SARS-COV-2) (CORONAVIRUS DISEASE [COVID-19]), AMPLIFIED PROBE TECHNIQUE, MAKING USE OF HIGH THROUGHPUT TECHNOLOGIES AS DESCRIBED BY CMS-2020-01-R: HCPCS

## 2020-11-10 NOTE — PROGRESS NOTES
1. Encounter for screening for other viral diseases  Was spent the day with her son one week ago who recently tested positive for COVID, requesting testing.  - COVID/SARS CoV-2 PCR; Future

## 2020-11-11 LAB
COVID ORDER STATUS COVID19: NORMAL
SARS-COV-2 RNA RESP QL NAA+PROBE: NOTDETECTED
SPECIMEN SOURCE: NORMAL

## 2021-03-15 DIAGNOSIS — Z23 NEED FOR VACCINATION: ICD-10-CM

## 2021-04-13 ENCOUNTER — IMMUNIZATION (OUTPATIENT)
Dept: FAMILY PLANNING/WOMEN'S HEALTH CLINIC | Facility: IMMUNIZATION CENTER | Age: 56
End: 2021-04-13
Payer: COMMERCIAL

## 2021-04-13 DIAGNOSIS — Z23 ENCOUNTER FOR VACCINATION: Primary | ICD-10-CM

## 2021-04-13 PROCEDURE — 91300 PFIZER SARS-COV-2 VACCINE: CPT

## 2021-04-13 PROCEDURE — 0001A PFIZER SARS-COV-2 VACCINE: CPT

## 2021-04-30 DIAGNOSIS — E89.0 POSTOPERATIVE HYPOTHYROIDISM: ICD-10-CM

## 2021-04-30 RX ORDER — LEVOTHYROXINE SODIUM 175 MCG
TABLET ORAL
Qty: 30 TABLET | Refills: 0 | Status: SHIPPED | OUTPATIENT
Start: 2021-04-30 | End: 2021-07-22 | Stop reason: SDUPTHER

## 2021-04-30 NOTE — TELEPHONE ENCOUNTER
Received request via: Pharmacy    Was the patient seen in the last year in this department? No  LOV 02/19/2020    Does the patient have an active prescription (recently filled or refills available) for medication(s) requested? No

## 2021-05-01 NOTE — TELEPHONE ENCOUNTER
Requested Prescriptions     Pending Prescriptions Disp Refills   • SYNTHROID 175 MCG Tab [Pharmacy Med Name: SYNTHROID 0.175MG (175MCG) TABLETS] 30 tablet 0     Sig: TAKE 1 TABLET BY MOUTH EVERY MORNING ON AN EMPTY STOMACH       TOVA Nieto.

## 2021-05-07 ENCOUNTER — IMMUNIZATION (OUTPATIENT)
Dept: FAMILY PLANNING/WOMEN'S HEALTH CLINIC | Facility: IMMUNIZATION CENTER | Age: 56
End: 2021-05-07
Attending: INTERNAL MEDICINE
Payer: COMMERCIAL

## 2021-05-07 DIAGNOSIS — Z23 ENCOUNTER FOR VACCINATION: Primary | ICD-10-CM

## 2021-05-07 PROCEDURE — 91300 PFIZER SARS-COV-2 VACCINE: CPT | Performed by: INTERNAL MEDICINE

## 2021-05-07 PROCEDURE — 0002A PFIZER SARS-COV-2 VACCINE: CPT | Performed by: INTERNAL MEDICINE

## 2021-06-07 DIAGNOSIS — E89.0 POSTOPERATIVE HYPOTHYROIDISM: ICD-10-CM

## 2021-06-07 RX ORDER — LEVOTHYROXINE SODIUM 175 MCG
TABLET ORAL
Qty: 30 TABLET | Refills: 0 | OUTPATIENT
Start: 2021-06-07

## 2021-06-07 NOTE — TELEPHONE ENCOUNTER
Received request via: Pharmacy    Was the patient seen in the last year in this department? No 2/19/20    Does the patient have an active prescription (recently filled or refills available) for medication(s) requested? No

## 2021-06-10 NOTE — TELEPHONE ENCOUNTER
Phone Number Called: [phone] (home)       Call outcome:Left a detailed voice message     Message: No further refills. Needs updated labs and appointment

## 2021-07-19 DIAGNOSIS — E66.01 MORBID OBESITY WITH BMI OF 50.0-59.9, ADULT (HCC): ICD-10-CM

## 2021-07-19 DIAGNOSIS — E78.5 DYSLIPIDEMIA: ICD-10-CM

## 2021-07-19 DIAGNOSIS — E89.0 POSTOPERATIVE HYPOTHYROIDISM: ICD-10-CM

## 2021-07-19 DIAGNOSIS — Z00.00 ROUTINE HEALTH MAINTENANCE: ICD-10-CM

## 2021-07-19 DIAGNOSIS — E55.9 VITAMIN D DEFICIENCY: ICD-10-CM

## 2021-07-19 DIAGNOSIS — R73.03 PREDIABETES: ICD-10-CM

## 2021-07-19 NOTE — PROGRESS NOTES
1. Routine health maintenance  - HEMOGLOBIN A1C; Future  - CBC WITH DIFFERENTIAL; Future  - Comp Metabolic Panel; Future  - Lipid Profile; Future  - TSH WITH REFLEX TO FT4; Future  - VITAMIN D,25 HYDROXY; Future    2. Dyslipidemia  - Comp Metabolic Panel; Future  - Lipid Profile; Future    3. Morbid obesity with BMI of 50.0-59.9, adult (HCC)  - HEMOGLOBIN A1C; Future  - CBC WITH DIFFERENTIAL; Future  - Comp Metabolic Panel; Future  - Lipid Profile; Future  - TSH WITH REFLEX TO FT4; Future    4. Postoperative hypothyroidism  - TSH WITH REFLEX TO FT4; Future    5. Prediabetes  - HEMOGLOBIN A1C; Future  - Comp Metabolic Panel; Future  - Lipid Profile; Future    6. Vitamin D deficiency  - VITAMIN D,25 HYDROXY; Future

## 2021-07-20 ENCOUNTER — HOSPITAL ENCOUNTER (OUTPATIENT)
Dept: LAB | Facility: MEDICAL CENTER | Age: 56
End: 2021-07-20
Attending: NURSE PRACTITIONER
Payer: COMMERCIAL

## 2021-07-20 DIAGNOSIS — Z00.00 ROUTINE HEALTH MAINTENANCE: ICD-10-CM

## 2021-07-20 DIAGNOSIS — E66.01 MORBID OBESITY WITH BMI OF 50.0-59.9, ADULT (HCC): ICD-10-CM

## 2021-07-20 DIAGNOSIS — E89.0 POSTOPERATIVE HYPOTHYROIDISM: ICD-10-CM

## 2021-07-20 DIAGNOSIS — R73.03 PREDIABETES: ICD-10-CM

## 2021-07-20 DIAGNOSIS — E78.5 DYSLIPIDEMIA: ICD-10-CM

## 2021-07-20 DIAGNOSIS — E55.9 VITAMIN D DEFICIENCY: ICD-10-CM

## 2021-07-20 LAB
25(OH)D3 SERPL-MCNC: 40 NG/ML (ref 30–100)
ALBUMIN SERPL BCP-MCNC: 3.9 G/DL (ref 3.2–4.9)
ALBUMIN/GLOB SERPL: 1.4 G/DL
ALP SERPL-CCNC: 88 U/L (ref 30–99)
ALT SERPL-CCNC: 14 U/L (ref 2–50)
ANION GAP SERPL CALC-SCNC: 7 MMOL/L (ref 7–16)
AST SERPL-CCNC: 15 U/L (ref 12–45)
BASOPHILS # BLD AUTO: 0.8 % (ref 0–1.8)
BASOPHILS # BLD: 0.07 K/UL (ref 0–0.12)
BILIRUB SERPL-MCNC: 0.4 MG/DL (ref 0.1–1.5)
BUN SERPL-MCNC: 16 MG/DL (ref 8–22)
CALCIUM SERPL-MCNC: 9.3 MG/DL (ref 8.5–10.5)
CHLORIDE SERPL-SCNC: 106 MMOL/L (ref 96–112)
CHOLEST SERPL-MCNC: 133 MG/DL (ref 100–199)
CO2 SERPL-SCNC: 27 MMOL/L (ref 20–33)
CREAT SERPL-MCNC: 0.75 MG/DL (ref 0.5–1.4)
EOSINOPHIL # BLD AUTO: 0.35 K/UL (ref 0–0.51)
EOSINOPHIL NFR BLD: 4.2 % (ref 0–6.9)
ERYTHROCYTE [DISTWIDTH] IN BLOOD BY AUTOMATED COUNT: 45.2 FL (ref 35.9–50)
EST. AVERAGE GLUCOSE BLD GHB EST-MCNC: 120 MG/DL
FASTING STATUS PATIENT QL REPORTED: NORMAL
GLOBULIN SER CALC-MCNC: 2.7 G/DL (ref 1.9–3.5)
GLUCOSE SERPL-MCNC: 105 MG/DL (ref 65–99)
HBA1C MFR BLD: 5.8 % (ref 4–5.6)
HCT VFR BLD AUTO: 42.5 % (ref 37–47)
HDLC SERPL-MCNC: 36 MG/DL
HGB BLD-MCNC: 13.3 G/DL (ref 12–16)
IMM GRANULOCYTES # BLD AUTO: 0.05 K/UL (ref 0–0.11)
IMM GRANULOCYTES NFR BLD AUTO: 0.6 % (ref 0–0.9)
LDLC SERPL CALC-MCNC: 76 MG/DL
LYMPHOCYTES # BLD AUTO: 2.17 K/UL (ref 1–4.8)
LYMPHOCYTES NFR BLD: 25.9 % (ref 22–41)
MCH RBC QN AUTO: 27.4 PG (ref 27–33)
MCHC RBC AUTO-ENTMCNC: 31.3 G/DL (ref 33.6–35)
MCV RBC AUTO: 87.4 FL (ref 81.4–97.8)
MONOCYTES # BLD AUTO: 0.69 K/UL (ref 0–0.85)
MONOCYTES NFR BLD AUTO: 8.2 % (ref 0–13.4)
NEUTROPHILS # BLD AUTO: 5.04 K/UL (ref 2–7.15)
NEUTROPHILS NFR BLD: 60.3 % (ref 44–72)
NRBC # BLD AUTO: 0 K/UL
NRBC BLD-RTO: 0 /100 WBC
PLATELET # BLD AUTO: 314 K/UL (ref 164–446)
PMV BLD AUTO: 10.4 FL (ref 9–12.9)
POTASSIUM SERPL-SCNC: 4.5 MMOL/L (ref 3.6–5.5)
PROT SERPL-MCNC: 6.6 G/DL (ref 6–8.2)
RBC # BLD AUTO: 4.86 M/UL (ref 4.2–5.4)
SODIUM SERPL-SCNC: 140 MMOL/L (ref 135–145)
TRIGL SERPL-MCNC: 105 MG/DL (ref 0–149)
TSH SERPL DL<=0.005 MIU/L-ACNC: 3.06 UIU/ML (ref 0.38–5.33)
WBC # BLD AUTO: 8.4 K/UL (ref 4.8–10.8)

## 2021-07-20 PROCEDURE — 80061 LIPID PANEL: CPT

## 2021-07-20 PROCEDURE — 82306 VITAMIN D 25 HYDROXY: CPT

## 2021-07-20 PROCEDURE — 36415 COLL VENOUS BLD VENIPUNCTURE: CPT

## 2021-07-20 PROCEDURE — 85025 COMPLETE CBC W/AUTO DIFF WBC: CPT

## 2021-07-20 PROCEDURE — 84443 ASSAY THYROID STIM HORMONE: CPT

## 2021-07-20 PROCEDURE — 83036 HEMOGLOBIN GLYCOSYLATED A1C: CPT

## 2021-07-20 PROCEDURE — 80053 COMPREHEN METABOLIC PANEL: CPT

## 2021-07-22 ENCOUNTER — OFFICE VISIT (OUTPATIENT)
Dept: MEDICAL GROUP | Facility: PHYSICIAN GROUP | Age: 56
End: 2021-07-22
Payer: COMMERCIAL

## 2021-07-22 VITALS
SYSTOLIC BLOOD PRESSURE: 130 MMHG | WEIGHT: 293 LBS | OXYGEN SATURATION: 92 % | HEIGHT: 64 IN | TEMPERATURE: 96.8 F | DIASTOLIC BLOOD PRESSURE: 70 MMHG | HEART RATE: 89 BPM | BODY MASS INDEX: 50.02 KG/M2

## 2021-07-22 DIAGNOSIS — E55.9 VITAMIN D DEFICIENCY: ICD-10-CM

## 2021-07-22 DIAGNOSIS — Z12.12 SCREENING FOR COLORECTAL CANCER: ICD-10-CM

## 2021-07-22 DIAGNOSIS — E89.0 POSTOPERATIVE HYPOTHYROIDISM: ICD-10-CM

## 2021-07-22 DIAGNOSIS — J30.9 CHRONIC ALLERGIC RHINITIS: ICD-10-CM

## 2021-07-22 DIAGNOSIS — L20.84 INTRINSIC ECZEMA: ICD-10-CM

## 2021-07-22 DIAGNOSIS — Z12.11 SCREENING FOR COLORECTAL CANCER: ICD-10-CM

## 2021-07-22 DIAGNOSIS — Z23 NEED FOR VACCINATION: ICD-10-CM

## 2021-07-22 DIAGNOSIS — Z00.00 ENCOUNTER FOR WELL ADULT EXAM WITHOUT ABNORMAL FINDINGS: ICD-10-CM

## 2021-07-22 DIAGNOSIS — R73.03 PREDIABETES: ICD-10-CM

## 2021-07-22 DIAGNOSIS — E66.01 MORBID OBESITY WITH BMI OF 50.0-59.9, ADULT (HCC): ICD-10-CM

## 2021-07-22 DIAGNOSIS — Z12.31 ENCOUNTER FOR SCREENING MAMMOGRAM FOR BREAST CANCER: ICD-10-CM

## 2021-07-22 DIAGNOSIS — Z00.00 ROUTINE HEALTH MAINTENANCE: ICD-10-CM

## 2021-07-22 DIAGNOSIS — E78.5 DYSLIPIDEMIA: ICD-10-CM

## 2021-07-22 PROCEDURE — 90471 IMMUNIZATION ADMIN: CPT | Performed by: NURSE PRACTITIONER

## 2021-07-22 PROCEDURE — 99396 PREV VISIT EST AGE 40-64: CPT | Mod: 25 | Performed by: NURSE PRACTITIONER

## 2021-07-22 PROCEDURE — 90750 HZV VACC RECOMBINANT IM: CPT | Performed by: NURSE PRACTITIONER

## 2021-07-22 RX ORDER — LEVOTHYROXINE SODIUM 175 MCG
175 TABLET ORAL
Qty: 90 TABLET | Refills: 3 | Status: SHIPPED | OUTPATIENT
Start: 2021-07-22 | End: 2022-02-07 | Stop reason: CLARIF

## 2021-07-22 RX ORDER — TRIAMCINOLONE ACETONIDE 5 MG/G
1 CREAM TOPICAL 2 TIMES DAILY PRN
Qty: 15 G | Refills: 6 | Status: SHIPPED | OUTPATIENT
Start: 2021-07-22

## 2021-07-22 ASSESSMENT — FIBROSIS 4 INDEX: FIB4 SCORE: 0.74

## 2021-07-22 ASSESSMENT — PATIENT HEALTH QUESTIONNAIRE - PHQ9: CLINICAL INTERPRETATION OF PHQ2 SCORE: 0

## 2021-07-22 NOTE — ASSESSMENT & PLAN NOTE
New problem to examiner.  Patient reports that she was diagnosed with eczema as a child and symptoms were severe affecting her arms and face.  Currently, she tends to have some flares of mild eczema on the back of her neck, behind her ear, or on her eyelid when she is stressed.  Believes she has flares about twice per month.  A friend gave her triamcinolone 0.5% cream to try, she has tried this for the last month and reports that it has improved.  Requesting her own prescription.

## 2021-07-22 NOTE — PROGRESS NOTES
Subjective:   CC:   Chief Complaint   Patient presents with   • Medication Refill     Synthroid     HPI:   Antonella Frausto is a 56 y.o. female who presents for annual exam    Intrinsic eczema  New problem to examiner.  Patient reports that she was diagnosed with eczema as a child and symptoms were severe affecting her arms and face.  Currently, she tends to have some flares of mild eczema on the back of her neck, behind her ear, or on her eyelid when she is stressed.  Believes she has flares about twice per month.  A friend gave her triamcinolone 0.5% cream to try, she has tried this for the last month and reports that it has improved.  Requesting her own prescription.    Chronic allergic rhinitis  Chronic, ongoing.  Continues over-the-counter Zyrtec 10 mg daily.    Postoperative hypothyroidism  Chronic, stable.  Continues brand-name Synthroid 175 mcg daily, denies side effects with medication.  Most recent TSH in normal limits.  Due for annual labs in July 2022.    Morbid obesity with BMI of 50.0-59.9, adult (HCC)  Chronic, ongoing. Trying to eat better, decreasing carbs. Is not exercising as much as she should. Recently moved into a 2 story house so she has been using stairs more often.  Due for annual labs in July 2022.    Dyslipidemia  Chronic, ongoing without medication.  Due for updated annual labs in July 2022.  The 10-year ASCVD risk score ([place]ridge DC Jr., et al., 2013) is: 2.7%     Prediabetes  Chronic, ongoing without medication.  She has been working on improving her diet by decreasing carbs.  She is not regularly exercising, but did recently move into a two-story house which has her going up and down stairs more often.  Due for annual labs in July 2022.    Vitamin D deficiency  Chronic, ongoing.  Continues over-the-counter vitamin D supplement daily.  Due for annual labs in July 2022.     Patient has GYN provider: No - PCP  Last Pap Smear: 2018, 5 year recall   H/O Abnormal Pap: No  Last Mammogram:  2018  Last Bone Density Test: NA  Last Colorectal Cancer Screening: Never, referring today  Last Tdap: unknown  Received HPV series: Aged out    Exercise: no regular exercise, sedentary  Diet: poor, working on decreasing carbs.      No LMP recorded. Patient is perimenopausal.  She has not utilized hormone replacement therapy.  Reports hot flashes and night sweats. Uses evening primrose to manage symptoms.  No significant bloating/fluid retention, pelvic pain, or dyspareunia. No abnormal vaginal discharge.   No breast tenderness, mass, nipple discharge or changes in size or contour.    OB History    Para Term  AB Living   4 3 3 0 1 3   SAB TAB Ectopic Molar Multiple Live Births   1 0 0 0 0 3      She  reports being sexually active and has had partner(s) who are Male.  She  has a past medical history of Arthritis, Bronchitis (12-15-14), Hypothyroidism, Increased BMI, Pain (14), and Venous thrombosis (2018).  She  has a past surgical history that includes cholecystectomy (); gyn surgery; thyroidectomy total (10/20/2010); node biopsy (10/20/2010); knee arthroscopy (2014); medial meniscectomy (2014); and chondroplasty (2014).    Family History   Problem Relation Age of Onset   • Cancer Mother         Thyroid ca, lymphoma   • Thyroid Mother    • Heart Disease Father    • Heart Failure Father    • No Known Problems Sister    • Heart Failure Brother    • Cancer Maternal Grandmother         Cervical Cancer   • No Known Problems Maternal Grandfather    • Dementia Paternal Grandmother    • Parkinson's Disease Paternal Grandmother    • Cancer Paternal Grandfather         Throat and liver   • Arthritis Brother         psoriatic   • DVT Brother    • Psoriasis Brother    • Heart Disease Brother      Social History     Tobacco Use   • Smoking status: Never Smoker   • Smokeless tobacco: Never Used   Vaping Use   • Vaping Use: Never used   Substance Use Topics   • Alcohol use: No   •  "Drug use: No     Patient Active Problem List    Diagnosis Date Noted   • Intrinsic eczema 07/22/2021   • Prediabetes 02/13/2020   • Shortness of breath 11/12/2019   • Chronic allergic rhinitis 04/23/2018   • Morbid obesity with BMI of 50.0-59.9, adult (HCC) 02/16/2018   • Vitamin D deficiency 08/16/2017   • Osteoarthritis of foot 07/10/2013   • Postoperative hypothyroidism 09/14/2012   • Dyslipidemia 09/14/2012     Current Outpatient Medications   Medication Sig Dispense Refill   • triamcinolone acetonide (KENALOG) 0.5 % Cream Apply 1 g topically 2 times a day as needed (eczema). 15 g 6   • SYNTHROID 175 MCG Tab Take 1 tablet by mouth every morning on an empty stomach. ON A EMPTY STOMACH 90 tablet 3   • aspirin EC (ECOTRIN) 81 MG Tablet Delayed Response Take 81 mg by mouth every day.     • Cholecalciferol (VITAMIN D3) 2000 UNIT CAPS Take 4,000 Units by mouth every day.     • cetirizine (ZYRTEC) 10 MG TABS Take 10 mg by mouth every day.     • Evening Primrose OIL Take 1 Tab by mouth every morning.       No current facility-administered medications for this visit.     Allergies   Allergen Reactions   • Asa [Aspirin] Vomiting and Nausea   • Codeine Vomiting and Nausea   • Latex      \"irritates my skin\"     Review of Systems   Constitutional: Negative for fever, chills and malaise/fatigue.   HENT: Negative for congestion.    Eyes: Negative for pain.   Respiratory: Negative for cough and shortness of breath.    Cardiovascular: Negative for chest pain and leg swelling.   Gastrointestinal: + Intermittent difficulty swallowing saliva.  Negative for nausea, vomiting, abdominal pain and diarrhea.   Genitourinary: Negative for dysuria and hematuria.   Skin: + Eczema behind neck.  MSK: + Chronic knee pain.  Neurological: Negative for dizziness, focal weakness and headaches.   Endo/Heme/Allergies: Does not bruise/bleed easily.   Psychiatric/Behavioral: Negative for depression.  The patient is not nervous/anxious.      Objective: " "  /70 (BP Location: Left arm, Patient Position: Sitting, BP Cuff Size: Adult)   Pulse 89   Temp 36 °C (96.8 °F) (Temporal)   Ht 1.626 m (5' 4\")   Wt (!) 134 kg (296 lb)   SpO2 92%   BMI 50.81 kg/m²     Wt Readings from Last 4 Encounters:   07/22/21 (!) 134 kg (296 lb)   02/19/20 (!) 137 kg (302 lb)   11/26/19 (!) 136 kg (300 lb)   11/12/19 (!) 134 kg (296 lb)     Physical Exam:  Constitutional: Well-developed and well-nourished. Not diaphoretic. No distress.   Skin: Skin is warm and dry. No rash noted.  Head: Atraumatic without lesions.  Eyes: Conjunctivae and extraocular motions are normal. Pupils are equal, round, and reactive to light. No scleral icterus.   Ears:  External ears unremarkable. Tympanic membranes clear and intact.  Nose: Nares patent. Septum midline. Turbinates without erythema nor edema. No discharge.   Mouth/Throat: Tongue normal. Oropharynx is clear and moist. Posterior pharynx without erythema or exudates.  Neck: Supple, trachea midline. Normal range of motion. No thyromegaly present. No lymphadenopathy--cervical or supraclavicular.  Cardiovascular: Generalized 1+ pitting edema bilateral lower extremities.  Regular rate and rhythm, S1 and S2 without murmur, rubs, or gallops.    Respiratory: Effort normal. Clear to auscultation throughout. No adventitious sounds.   Breast:  Breast exam deferred. Discussed monthly self exams and what to look for, including peau d'orange or nipple retraction, discharge, breasts moving freely and equally without restriction, axillary/supraclavicular adenopathy, or palpable masses/nodules.  Abdomen: Obese, soft, non tender, and without distention. Active bowel sounds in all four quadrants. No rebound, guarding.  Extremities: No cyanosis, clubbing, erythema.  Radial pulses intact and symmetric.   Musculoskeletal: All major joints AROM full in all directions without pain.  Neurological: Alert and oriented x 3. Grossly non-focal. Strength and sensation " grossly intact.   Psychiatric:  Behavior, mood, and affect are appropriate.    Assessment and Plan:   1. Encounter for well adult exam without abnormal findings  Due for annual labs in July 2022.  - HEMOGLOBIN A1C; Future  - Comp Metabolic Panel; Future  - CBC WITH DIFFERENTIAL; Future  - Lipid Profile; Future  - TSH WITH REFLEX TO FT4; Future  - VITAMIN D,25 HYDROXY; Future    2. Morbid obesity with BMI of 50.0-59.9, adult (HCC)  Chronic, ongoing.  Encouraged diet high in fruits, vegetables, and fiber. And a diet low in salt, refined carbohydrates, cholesterol, saturated fat, and trans fatty acids.    Encouraged a minimum of 30 minutes of moderate intensity aerobic exercise (eg, brisk walking) is recommended on five days each week. Or 30 minutes of vigorous-intensity aerobic exercise (eg, jogging) on three days each week.   Patient's body mass index is 50.81 kg/m². Exercise and nutrition counseling were performed at this visit.  Due for annual labs in July 2022.  - HEMOGLOBIN A1C; Future  - Comp Metabolic Panel; Future  - CBC WITH DIFFERENTIAL; Future  - Lipid Profile; Future  - TSH WITH REFLEX TO FT4; Future    3. Postoperative hypothyroidism  Chronic, stable.  Continue brand-name Synthroid 175 mcg daily, refill sent to pharmacy.  Due for updated annual labs in July 2022.  - SYNTHROID 175 MCG Tab; Take 1 tablet by mouth every morning on an empty stomach. ON A EMPTY STOMACH  Dispense: 90 tablet; Refill: 3  - TSH WITH REFLEX TO FT4; Future    4. Dyslipidemia  Chronic, ongoing without medication.  Encouraged healthy diet, regular exercise, and loss.  Due for updated annual labs in July 2022.  - Comp Metabolic Panel; Future  - Lipid Profile; Future    5. Prediabetes  Chronic, ongoing without medication.  Encouraged healthy diet, regular exercise, and loss.  Due for updated annual labs in July 2022.  - HEMOGLOBIN A1C; Future  - Comp Metabolic Panel; Future  - Lipid Profile; Future    6. Intrinsic eczema  New problem to  examiner.  Continue triamcinolone cream twice daily as needed.  Do not use for more than 2 weeks at a time, take a 2-week break in between.  Due for annual labs in July 2022.  - triamcinolone acetonide (KENALOG) 0.5 % Cream; Apply 1 g topically 2 times a day as needed (eczema).  Dispense: 15 g; Refill: 6  - CBC WITH DIFFERENTIAL; Future    7. Chronic allergic rhinitis  Chronic, ongoing.  Continue over-the-counter cetirizine daily.  Due for annual labs in July 2022.  - CBC WITH DIFFERENTIAL; Future    8. Vitamin D deficiency  Chronic, stable.  Continue over-the-counter vitamin D supplement daily.  Due for updated annual labs in July 2022.  - VITAMIN D,25 HYDROXY; Future    9. Encounter for screening mammogram for breast cancer  Due for screening.  - MA-SCREENING MAMMO BILAT W/CAD; Future    10. Screening for colorectal cancer  Due for screening.  Recommend that the patient discuss her difficulty swallowing saliva with gastroenterology.  - REFERRAL TO GI FOR COLONOSCOPY    11. Need for vaccination  Given today.  Return after 9/16/2021 for second and final dose.  - Shingrix Vaccine    12. Routine health maintenance  Due for annual labs in July 2022.  - HEMOGLOBIN A1C; Future  - Comp Metabolic Panel; Future  - CBC WITH DIFFERENTIAL; Future  - Lipid Profile; Future  - TSH WITH REFLEX TO FT4; Future  - VITAMIN D,25 HYDROXY; Future     I have placed the below orders and discussed them with an approved delegating provider.  The MA is performing the below orders under the direction of Dr. Miranda.     Health maintenance: up to date   Labs per orders  Immunizations per orders  Patient counseled about skin care, diet, supplements, and exercise.  Discussed  breast self exam, mammography screening, menopause, osteoporosis, adequate intake of calcium and vitamin D, diet and exercise, colorectal cancer screening.     Follow-up: Return in about 1 year (around 7/22/2022) for Preventative Annual, Follow up Labs.     Please note  that this dictation was created using voice recognition software. I have worked with consultants from the vendor as well as technical experts from [facility] to optimize the interface. I have made every reasonable attempt to correct obvious errors, but I expect that there are errors of grammar and possibly content that I did not discover before finalizing the note.

## 2021-07-22 NOTE — ASSESSMENT & PLAN NOTE
Chronic, stable.  Continues brand-name Synthroid 175 mcg daily, denies side effects with medication.  Most recent TSH in normal limits.  Due for annual labs in July 2022.

## 2021-07-22 NOTE — ASSESSMENT & PLAN NOTE
Chronic, ongoing without medication.  She has been working on improving her diet by decreasing carbs.  She is not regularly exercising, but did recently move into a two-story house which has her going up and down stairs more often.  Due for annual labs in July 2022.

## 2021-07-22 NOTE — ASSESSMENT & PLAN NOTE
Chronic, ongoing. Trying to eat better, decreasing carbs. Is not exercising as much as she should. Recently moved into a 2 story house so she has been using stairs more often.  Due for annual labs in July 2022.

## 2021-07-22 NOTE — ASSESSMENT & PLAN NOTE
Chronic, ongoing.  Continues over-the-counter vitamin D supplement daily.  Due for annual labs in July 2022.

## 2021-07-22 NOTE — ASSESSMENT & PLAN NOTE
Chronic, ongoing without medication.  Due for updated annual labs in July 2022.  The 10-year ASCVD risk score (Torrey KARIME Jr., et al., 2013) is: 2.7%

## 2022-01-07 ENCOUNTER — OFFICE VISIT (OUTPATIENT)
Dept: URGENT CARE | Facility: PHYSICIAN GROUP | Age: 57
End: 2022-01-07
Payer: COMMERCIAL

## 2022-01-07 VITALS
SYSTOLIC BLOOD PRESSURE: 124 MMHG | TEMPERATURE: 97 F | BODY MASS INDEX: 50.02 KG/M2 | HEIGHT: 64 IN | HEART RATE: 54 BPM | OXYGEN SATURATION: 94 % | WEIGHT: 293 LBS | DIASTOLIC BLOOD PRESSURE: 82 MMHG | RESPIRATION RATE: 14 BRPM

## 2022-01-07 DIAGNOSIS — U07.1 COVID-19: ICD-10-CM

## 2022-01-07 LAB
EXTERNAL QUALITY CONTROL: NORMAL
SARS-COV+SARS-COV-2 AG RESP QL IA.RAPID: POSITIVE

## 2022-01-07 PROCEDURE — 99213 OFFICE O/P EST LOW 20 MIN: CPT | Mod: CS | Performed by: NURSE PRACTITIONER

## 2022-01-07 PROCEDURE — 87426 SARSCOV CORONAVIRUS AG IA: CPT | Performed by: NURSE PRACTITIONER

## 2022-01-07 RX ORDER — BENZONATATE 100 MG/1
100 CAPSULE ORAL 3 TIMES DAILY PRN
Qty: 30 CAPSULE | Refills: 0 | Status: SHIPPED | OUTPATIENT
Start: 2022-01-07 | End: 2022-07-25

## 2022-01-07 ASSESSMENT — ENCOUNTER SYMPTOMS
MYALGIAS: 1
COUGH: 1
VOMITING: 0
WHEEZING: 0
SORE THROAT: 1
FEVER: 1
HEMOPTYSIS: 0
HEADACHES: 1
SHORTNESS OF BREATH: 0
SPUTUM PRODUCTION: 0
DIARRHEA: 0
NAUSEA: 1

## 2022-01-07 ASSESSMENT — FIBROSIS 4 INDEX: FIB4 SCORE: 0.74

## 2022-01-07 NOTE — PROGRESS NOTES
Subjective:     Antonella Frausto is a 56 y.o. female who presents for Cough (fever, sore throat x 3 days )      Fever 100.2, now resolved. No headache today. Taking Advil.  Started on Tuesday. No hx of COVID. Vaccinated.     Cough  This is a new problem. The current episode started in the past 7 days. The problem has been waxing and waning. The cough is non-productive. Associated symptoms include a fever, headaches, myalgias, nasal congestion and a sore throat. Pertinent negatives include no ear pain, hemoptysis, shortness of breath or wheezing. Nothing aggravates the symptoms. The treatment provided moderate relief.       Past Medical History:   Diagnosis Date   • Arthritis     FEET & knees   • Bronchitis 12-15-14    on antibiotic   • Hypothyroidism    • Increased BMI    • Pain 12-22-14    right leg, feet, 4/10   • Venous thrombosis 8/16/2018       Past Surgical History:   Procedure Laterality Date   • KNEE ARTHROSCOPY  12/29/2014    Performed by Chas Titus M.D. at SURGERY SAME DAY [facility] ORS   • MENISCECTOMY, KNEE, MEDIAL  12/29/2014    Performed by Chas Titus M.D. at SURGERY SAME DAY [facility] ORS   • CHONDROPLASTY  12/29/2014    Performed by Chas Titus M.D. at SURGERY SAME DAY [facility] ORS   • THYROIDECTOMY TOTAL  10/20/2010    Performed by SHABBIR BROOKE at SURGERY SAME DAY [facility] ORS   • NODE BIOPSY  10/20/2010    Performed by SHABBIR BROOKE at SURGERY SAME DAY [facility] ORS   • CHOLECYSTECTOMY  1995   • GYN SURGERY      D & C with miscarrage        Social History     Socioeconomic History   • Marital status:      Spouse name: Shakeel Frausto   • Number of children: 3   • Years of education: Not on file   • Highest education level: Not on file   Occupational History   • Not on file   Tobacco Use   • Smoking status: Never Smoker   • Smokeless tobacco: Never Used   Vaping Use   • Vaping Use: Never used   Substance and Sexual Activity   • Alcohol use: No   • Drug use: No   • Sexual  activity: Yes     Partners: Male   Other Topics Concern   • Not on file   Social History Narrative   • Not on file     Social Determinants of Health     Financial Resource Strain:    • Difficulty of Paying Living Expenses: Not on file   Food Insecurity:    • Worried About Running Out of Food in the Last Year: Not on file   • Ran Out of Food in the Last Year: Not on file   Transportation Needs:    • Lack of Transportation (Medical): Not on file   • Lack of Transportation (Non-Medical): Not on file   Physical Activity:    • Days of Exercise per Week: Not on file   • Minutes of Exercise per Session: Not on file   Stress:    • Feeling of Stress : Not on file   Social Connections:    • Frequency of Communication with Friends and Family: Not on file   • Frequency of Social Gatherings with Friends and Family: Not on file   • Attends Worship Services: Not on file   • Active Member of Clubs or Organizations: Not on file   • Attends Club or Organization Meetings: Not on file   • Marital Status: Not on file   Intimate Partner Violence:    • Fear of Current or Ex-Partner: Not on file   • Emotionally Abused: Not on file   • Physically Abused: Not on file   • Sexually Abused: Not on file   Housing Stability:    • Unable to Pay for Housing in the Last Year: Not on file   • Number of Places Lived in the Last Year: Not on file   • Unstable Housing in the Last Year: Not on file        Family History   Problem Relation Age of Onset   • Cancer Mother         Thyroid ca, lymphoma   • Thyroid Mother    • Heart Disease Father    • Heart Failure Father    • No Known Problems Sister    • Heart Failure Brother    • Cancer Maternal Grandmother         Cervical Cancer   • No Known Problems Maternal Grandfather    • Dementia Paternal Grandmother    • Parkinson's Disease Paternal Grandmother    • Cancer Paternal Grandfather         Throat and liver   • Arthritis Brother         psoriatic   • DVT Brother    • Psoriasis Brother    • Heart  "Disease Brother         Allergies   Allergen Reactions   • Asa [Aspirin] Vomiting and Nausea   • Codeine Vomiting and Nausea   • Latex      \"irritates my skin\"       Review of Systems   Constitutional: Positive for fever and malaise/fatigue.   HENT: Positive for congestion and sore throat. Negative for ear pain.    Respiratory: Positive for cough. Negative for hemoptysis, sputum production, shortness of breath and wheezing.    Gastrointestinal: Positive for nausea. Negative for diarrhea and vomiting.   Musculoskeletal: Positive for myalgias.   Neurological: Positive for headaches.   All other systems reviewed and are negative.       Objective:   /82 (BP Location: Left arm, Patient Position: Sitting, BP Cuff Size: Adult)   Pulse (!) 54   Temp 36.1 °C (97 °F) (Temporal)   Resp 14   Ht 1.626 m (5' 4\")   Wt (!) 135 kg (297 lb)   SpO2 94%   BMI 50.98 kg/m²     Physical Exam  Vitals reviewed.   Constitutional:       General: She is not in acute distress.     Appearance: She is well-developed. She is ill-appearing. She is not toxic-appearing.   HENT:      Head: Normocephalic and atraumatic.      Right Ear: Tympanic membrane, ear canal and external ear normal.      Left Ear: Tympanic membrane, ear canal and external ear normal.      Nose: Rhinorrhea present.      Mouth/Throat:      Mouth: Mucous membranes are moist.      Pharynx: Posterior oropharyngeal erythema present.   Eyes:      Conjunctiva/sclera: Conjunctivae normal.   Cardiovascular:      Rate and Rhythm: Normal rate.   Pulmonary:      Effort: Pulmonary effort is normal. No respiratory distress.      Breath sounds: No stridor. No wheezing, rhonchi or rales.      Comments: Cough noted.   Musculoskeletal:         General: Normal range of motion.      Cervical back: Normal range of motion and neck supple.   Skin:     General: Skin is warm and dry.      Findings: No rash.   Neurological:      General: No focal deficit present.      Mental Status: She is " alert and oriented to person, place, and time.      GCS: GCS eye subscore is 4. GCS verbal subscore is 5. GCS motor subscore is 6.   Psychiatric:         Mood and Affect: Mood normal.         Speech: Speech normal.         Behavior: Behavior normal.         Thought Content: Thought content normal.         Judgment: Judgment normal.         Assessment/Plan:   1. COVID-19  - POCT SARS-COV Antigen KALI (Symptomatic Only)  - benzonatate (TESSALON) 100 MG Cap; Take 1 Capsule by mouth 3 times a day as needed for Cough.  Dispense: 30 Capsule; Refill: 0    Symptomatic care.  -Oral hydration and rest.   -Cough control: nonpharmacologic options for cough relief such as throat lozenges, hot tea, honey.  -Over the counter expectorant as directed; Guaifenesin (Mucinex).  -Tylenol or ibuprofen for pain and fever as directed.     Seek emergency medical care immediately for: Trouble breathing, persistent pain or pressure in the chest, confusion, inability to wake or stay awake, bluish lips or face, persistent tachycardia (fast heart rate), prolonged dizziness, persistent high grade fevers. Follow up for prolonged cough, persistent wheezing, leg swelling, or any other concerns. Follow up with PCP.     -Discussed viral etiology. COVID S&S, and self isolation guidelines. S&S of PNA with follow up. Stable vitals.     Differential diagnosis, natural history, supportive care, and indications for immediate follow-up discussed.

## 2022-01-07 NOTE — PATIENT INSTRUCTIONS
Symptomatic care.  -Oral hydration and rest.   -Cough control: nonpharmacologic options for cough relief such as throat lozenges, hot tea, honey.  -Over the counter expectorant as directed; Guaifenesin (Mucinex).  -Tylenol or ibuprofen for pain and fever as directed.     Seek emergency medical care immediately for: Trouble breathing, persistent pain or pressure in the chest, confusion, inability to wake or stay awake, bluish lips or face, persistent tachycardia (fast heart rate), prolonged dizziness, persistent high grade fevers. Follow up for prolonged cough, persistent wheezing, leg swelling, or any other concerns. Follow up with PCP.           COVID-19  COVID-19 is a respiratory infection that is caused by a virus called severe acute respiratory syndrome coronavirus 2 (SARS-CoV-2). The disease is also known as coronavirus disease or novel coronavirus. In some people, the virus may not cause any symptoms. In others, it may cause a serious infection. The infection can get worse quickly and can lead to complications, such as:  · Pneumonia, or infection of the lungs.  · Acute respiratory distress syndrome or ARDS. This is fluid build-up in the lungs.  · Acute respiratory failure. This is a condition in which there is not enough oxygen passing from the lungs to the body.  · Sepsis or septic shock. This is a serious bodily reaction to an infection.  · Blood clotting problems.  · Secondary infections due to bacteria or fungus.  The virus that causes COVID-19 is contagious. This means that it can spread from person to person through droplets from coughs and sneezes (respiratory secretions).  What are the causes?  This illness is caused by a virus. You may catch the virus by:  · Breathing in droplets from an infected person's cough or sneeze.  · Touching something, like a table or a doorknob, that was exposed to the virus (contaminated) and then touching your mouth, nose, or eyes.  What increases the risk?  Risk for  infection  You are more likely to be infected with this virus if you:  · Live in or travel to an area with a COVID-19 outbreak.  · Come in contact with a sick person who recently traveled to an area with a COVID-19 outbreak.  · Provide care for or live with a person who is infected with COVID-19.  Risk for serious illness  You are more likely to become seriously ill from the virus if you:  · Are 65 years of age or older.  · Have a long-term disease that lowers your body's ability to fight infection (immunocompromised).  · Live in a nursing home or long-term care facility.  · Have a long-term (chronic) disease such as:  ? Chronic lung disease, including chronic obstructive pulmonary disease or asthma  ? Heart disease.  ? Diabetes.  ? Chronic kidney disease.  ? Liver disease.  · Are obese.  What are the signs or symptoms?  Symptoms of this condition can range from mild to severe. Symptoms may appear any time from 2 to 14 days after being exposed to the virus. They include:  · A fever.  · A cough.  · Difficulty breathing.  · Chills.  · Muscle pains.  · A sore throat.  · Loss of taste or smell.  Some people may also have stomach problems, such as nausea, vomiting, or diarrhea.  Other people may not have any symptoms of COVID-19.  How is this diagnosed?  This condition may be diagnosed based on:  · Your signs and symptoms, especially if:  ? You live in an area with a COVID-19 outbreak.  ? You recently traveled to or from an area where the virus is common.  ? You provide care for or live with a person who was diagnosed with COVID-19.  · A physical exam.  · Lab tests, which may include:  ? A nasal swab to take a sample of fluid from your nose.  ? A throat swab to take a sample of fluid from your throat.  ? A sample of mucus from your lungs (sputum).  ? Blood tests.  · Imaging tests, which may include, X-rays, CT scan, or ultrasound.  How is this treated?  At present, there is no medicine to treat COVID-19. Medicines that  treat other diseases are being used on a trial basis to see if they are effective against COVID-19.  Your health care provider will talk with you about ways to treat your symptoms. For most people, the infection is mild and can be managed at home with rest, fluids, and over-the-counter medicines.  Treatment for a serious infection usually takes places in a hospital intensive care unit (ICU). It may include one or more of the following treatments. These treatments are given until your symptoms improve.  · Receiving fluids and medicines through an IV.  · Supplemental oxygen. Extra oxygen is given through a tube in the nose, a face mask, or a severino.  · Positioning you to lie on your stomach (prone position). This makes it easier for oxygen to get into the lungs.  · Continuous positive airway pressure (CPAP) or bi-level positive airway pressure (BPAP) machine. This treatment uses mild air pressure to keep the airways open. A tube that is connected to a motor delivers oxygen to the body.  · Ventilator. This treatment moves air into and out of the lungs by using a tube that is placed in your windpipe.  · Tracheostomy. This is a procedure to create a hole in the neck so that a breathing tube can be inserted.  · Extracorporeal membrane oxygenation (ECMO). This procedure gives the lungs a chance to recover by taking over the functions of the heart and lungs. It supplies oxygen to the body and removes carbon dioxide.  Follow these instructions at home:  Lifestyle  · If you are sick, stay home except to get medical care. Your health care provider will tell you how long to stay home. Call your health care provider before you go for medical care.  · Rest at home as told by your health care provider.  · Do not use any products that contain nicotine or tobacco, such as cigarettes, e-cigarettes, and chewing tobacco. If you need help quitting, ask your health care provider.  · Return to your normal activities as told by your health  care provider. Ask your health care provider what activities are safe for you.  General instructions  · Take over-the-counter and prescription medicines only as told by your health care provider.  · Drink enough fluid to keep your urine pale yellow.  · Keep all follow-up visits as told by your health care provider. This is important.  How is this prevented?    There is no vaccine to help prevent COVID-19 infection. However, there are steps you can take to protect yourself and others from this virus.  To protect yourself:   · Do not travel to areas where COVID-19 is a risk. The areas where COVID-19 is reported change often. To identify high-risk areas and travel restrictions, check the CDC travel website: wwwnc.cdc.gov/travel/notices  · If you live in, or must travel to, an area where COVID-19 is a risk, take precautions to avoid infection.  ? Stay away from people who are sick.  ? Wash your hands often with soap and water for 20 seconds. If soap and water are not available, use an alcohol-based hand .  ? Avoid touching your mouth, face, eyes, or nose.  ? Avoid going out in public, follow guidance from your state and local health authorities.  ? If you must go out in public, wear a cloth face covering or face mask.  ? Disinfect objects and surfaces that are frequently touched every day. This may include:  § Counters and tables.  § Doorknobs and light switches.  § Sinks and faucets.  § Electronics, such as phones, remote controls, keyboards, computers, and tablets.  To protect others:  If you have symptoms of COVID-19, take steps to prevent the virus from spreading to others.  · If you think you have a COVID-19 infection, contact your health care provider right away. Tell your health care team that you think you may have a COVID-19 infection.  · Stay home. Leave your house only to seek medical care. Do not use public transport.  · Do not travel while you are sick.  · Wash your hands often with soap and water  for 20 seconds. If soap and water are not available, use alcohol-based hand .  · Stay away from other members of your household. Let healthy household members care for children and pets, if possible. If you have to care for children or pets, wash your hands often and wear a mask. If possible, stay in your own room, separate from others. Use a different bathroom.  · Make sure that all people in your household wash their hands well and often.  · Cough or sneeze into a tissue or your sleeve or elbow. Do not cough or sneeze into your hand or into the air.  · Wear a cloth face covering or face mask.  Where to find more information  · Centers for Disease Control and Prevention: www.cdc.gov/coronavirus/2019-ncov/index.html  · World Health Organization: www.who.int/health-topics/coronavirus  Contact a health care provider if:  · You live in or have traveled to an area where COVID-19 is a risk and you have symptoms of the infection.  · You have had contact with someone who has COVID-19 and you have symptoms of the infection.  Get help right away if:  · You have trouble breathing.  · You have pain or pressure in your chest.  · You have confusion.  · You have bluish lips and fingernails.  · You have difficulty waking from sleep.  · You have symptoms that get worse.  These symptoms may represent a serious problem that is an emergency. Do not wait to see if the symptoms will go away. Get medical help right away. Call your local emergency services (911 in the [place] Do not drive yourself to the hospital. Let the emergency medical personnel know if you think you have COVID-19.  Summary  · COVID-19 is a respiratory infection that is caused by a virus. It is also known as coronavirus disease or novel coronavirus. It can cause serious infections, such as pneumonia, acute respiratory distress syndrome, acute respiratory failure, or sepsis.  · The virus that causes COVID-19 is contagious. This means that it can spread from  person to person through droplets from coughs and sneezes.  · You are more likely to develop a serious illness if you are 65 years of age or older, have a weak immunity, live in a nursing home, or have chronic disease.  · There is no medicine to treat COVID-19. Your health care provider will talk with you about ways to treat your symptoms.  · Take steps to protect yourself and others from infection. Wash your hands often and disinfect objects and surfaces that are frequently touched every day. Stay away from people who are sick and wear a mask if you are sick.  This information is not intended to replace advice given to you by your health care provider. Make sure you discuss any questions you have with your health care provider.  Document Released: 01/23/2020 Document Revised: 05/14/2020 Document Reviewed: 01/23/2020  Elsevier Patient Education © 2020 [employer]      Viral Respiratory Infection  A respiratory infection is an illness that affects part of the respiratory system, such as the lungs, nose, or throat. A respiratory infection that is caused by a virus is called a viral respiratory infection.  Common types of viral respiratory infections include:  · A cold.  · The flu (influenza).  · A respiratory syncytial virus (RSV) infection.  What are the causes?  This condition is caused by a virus.  What are the signs or symptoms?  Symptoms of this condition include:  · A stuffy or runny nose.  · Yellow or green nasal discharge.  · A cough.  · Sneezing.  · Fatigue.  · Achy muscles.  · A sore throat.  · Sweating or chills.  · A fever.  · A headache.  How is this diagnosed?  This condition may be diagnosed based on:  · Your symptoms.  · A physical exam.  · Testing of nasal swabs.  How is this treated?  This condition may be treated with medicines, such as:  · Antiviral medicine. This may shorten the length of time a person has symptoms.  · Expectorants. These make it easier to cough up mucus.  · Decongestant nasal  sprays.  · Acetaminophen or NSAIDs to relieve fever and pain.  Antibiotic medicines are not prescribed for viral infections. This is because antibiotics are designed to kill bacteria. They are not effective against viruses.  Follow these instructions at home:    Managing pain and congestion  · Take over-the-counter and prescription medicines only as told by your health care provider.  · If you have a sore throat, gargle with a salt-water mixture 3-4 times a day or as needed. To make a salt-water mixture, completely dissolve ½-1 tsp of salt in 1 cup of warm water.  · Use nose drops made from salt water to ease congestion and soften raw skin around your nose.  · Drink enough fluid to keep your urine pale yellow. This helps prevent dehydration and helps loosen up mucus.  General instructions  · Rest as much as possible.  · Do not drink alcohol.  · Do not use any products that contain nicotine or tobacco, such as cigarettes and e-cigarettes. If you need help quitting, ask your health care provider.  · Keep all follow-up visits as told by your health care provider. This is important.  How is this prevented?    · Get an annual flu shot. You may get the flu shot in late summer, fall, or winter. Ask your health care provider when you should get your flu shot.  · Avoid exposing others to your respiratory infection.  ? Stay home from work or school as told by your health care provider.  ? Wash your hands with soap and water often, especially after you cough or sneeze. If soap and water are not available, use alcohol-based hand .  · Avoid contact with people who are sick during cold and flu season. This is generally fall and winter.  Contact a health care provider if:  · Your symptoms last for 10 days or longer.  · Your symptoms get worse over time.  · You have a fever.  · You have severe sinus pain in your face or forehead.  · The glands in your jaw or neck become very swollen.  Get help right away if you:  · Feel  pain or pressure in your chest.  · Have shortness of breath.  · Faint or feel like you will faint.  · Have severe and persistent vomiting.  · Feel confused or disoriented.  Summary  · A respiratory infection is an illness that affects part of the respiratory system, such as the lungs, nose, or throat. A respiratory infection that is caused by a virus is called a viral respiratory infection.  · Common types of viral respiratory infections are a cold, influenza, and respiratory syncytial virus (RSV) infection.  · Symptoms of this condition include a stuffy or runny nose, cough, sneezing, fatigue, achy muscles, sore throat, and fevers or chills.  · Antibiotic medicines are not prescribed for viral infections. This is because antibiotics are designed to kill bacteria. They are not effective against viruses.  This information is not intended to replace advice given to you by your health care provider. Make sure you discuss any questions you have with your health care provider.  Document Released: 09/27/2006 Document Revised: 12/26/2019 Document Reviewed: 01/28/2019  [employer] Patient Education © 2020 [employer]

## 2022-02-07 DIAGNOSIS — E89.0 POSTOPERATIVE HYPOTHYROIDISM: ICD-10-CM

## 2022-02-07 RX ORDER — LEVOTHYROXINE SODIUM 175 UG/1
175 TABLET ORAL
Qty: 90 TABLET | Refills: 1 | Status: SHIPPED | OUTPATIENT
Start: 2022-02-07 | End: 2022-07-25 | Stop reason: SDUPTHER

## 2022-02-08 NOTE — PROGRESS NOTES
1. Postoperative hypothyroidism  Insurance not longer covering brand name synthroid, refill for levothyroxine 175 mcg sent to pharmacy. Due for updated labs in July 2022.  - levothyroxine (SYNTHROID) 175 MCG Tab; Take 1 Tablet by mouth every morning on an empty stomach.  Dispense: 90 Tablet; Refill: 1

## 2022-03-27 ENCOUNTER — OFFICE VISIT (OUTPATIENT)
Dept: URGENT CARE | Facility: PHYSICIAN GROUP | Age: 57
End: 2022-03-27
Payer: COMMERCIAL

## 2022-03-27 VITALS
TEMPERATURE: 97.6 F | HEART RATE: 90 BPM | HEIGHT: 64 IN | SYSTOLIC BLOOD PRESSURE: 124 MMHG | BODY MASS INDEX: 50.02 KG/M2 | OXYGEN SATURATION: 96 % | WEIGHT: 293 LBS | RESPIRATION RATE: 20 BRPM | DIASTOLIC BLOOD PRESSURE: 88 MMHG

## 2022-03-27 DIAGNOSIS — J98.8 RTI (RESPIRATORY TRACT INFECTION): ICD-10-CM

## 2022-03-27 PROCEDURE — 99214 OFFICE O/P EST MOD 30 MIN: CPT | Performed by: PHYSICIAN ASSISTANT

## 2022-03-27 RX ORDER — DOXYCYCLINE HYCLATE 100 MG
100 TABLET ORAL 2 TIMES DAILY
Qty: 14 TABLET | Refills: 0 | Status: SHIPPED | OUTPATIENT
Start: 2022-03-27 | End: 2022-04-03

## 2022-03-27 RX ORDER — BENZONATATE 200 MG/1
200 CAPSULE ORAL 3 TIMES DAILY PRN
Qty: 30 CAPSULE | Refills: 0 | Status: SHIPPED | OUTPATIENT
Start: 2022-03-27 | End: 2022-07-25

## 2022-03-27 RX ORDER — METHYLPREDNISOLONE 4 MG/1
TABLET ORAL
Qty: 21 TABLET | Refills: 0 | Status: SHIPPED | OUTPATIENT
Start: 2022-03-27 | End: 2022-07-25

## 2022-03-27 ASSESSMENT — ENCOUNTER SYMPTOMS
VOMITING: 0
CHILLS: 0
SHORTNESS OF BREATH: 0
SORE THROAT: 0
MYALGIAS: 0
SPUTUM PRODUCTION: 0
ABDOMINAL PAIN: 0
SINUS PAIN: 0
HEADACHES: 0
WHEEZING: 1
COUGH: 1
RHINORRHEA: 0
DIARRHEA: 0
NAUSEA: 0
HEMOPTYSIS: 0
FEVER: 0

## 2022-03-27 ASSESSMENT — FIBROSIS 4 INDEX: FIB4 SCORE: 0.74

## 2022-03-27 NOTE — PROGRESS NOTES
Subjective     Antonella Frausto is a 56 y.o. female who presents with Cough (X3 days )            Cough  This is a new problem. Episode onset: cold symptoms 6 days- worsening cough x 3 days. The problem has been gradually worsening. The cough is non-productive. Associated symptoms include nasal congestion and wheezing. Pertinent negatives include no chest pain, chills, ear congestion, ear pain, fever, headaches, hemoptysis, myalgias, rash, rhinorrhea, sore throat or shortness of breath. Nothing aggravates the symptoms. Treatments tried: Tessalon. The treatment provided moderate relief. Her past medical history is significant for bronchitis. There is no history of asthma or pneumonia.     The patient had COVID about 2 months ago. She is fully vaccinated. She denies sick exposures.  She has history of recurrent bronchitis.     Past Medical History:   Diagnosis Date   • Arthritis     FEET & knees   • Bronchitis 12-15-14    on antibiotic   • Hypothyroidism    • Increased BMI    • Pain 12-22-14    right leg, feet, 4/10   • Venous thrombosis 8/16/2018       Past Surgical History:   Procedure Laterality Date   • KNEE ARTHROSCOPY  12/29/2014    Performed by Chas Titus M.D. at SURGERY SAME DAY [facility]   • MENISCECTOMY, KNEE, MEDIAL  12/29/2014    Performed by Chas Titus M.D. at SURGERY SAME DAY [facility]   • CHONDROPLASTY  12/29/2014    Performed by Chas Titus M.D. at SURGERY SAME DAY [facility]   • THYROIDECTOMY TOTAL  10/20/2010    Performed by SHABBIR BROOKE at SURGERY SAME DAY [facility]   • NODE BIOPSY  10/20/2010    Performed by SHABBIR BROOKE at SURGERY SAME DAY [facility]   • CHOLECYSTECTOMY  1995   • GYN SURGERY      D & C with miscarrage        Family History   Problem Relation Age of Onset   • Cancer Mother         Thyroid ca, lymphoma   • Thyroid Mother    • Heart Disease Father    • Heart Failure Father    • No Known Problems Sister    • Heart Failure Brother    • Cancer Maternal  "Grandmother         Cervical Cancer   • No Known Problems Maternal Grandfather    • Dementia Paternal Grandmother    • Parkinson's Disease Paternal Grandmother    • Cancer Paternal Grandfather         Throat and liver   • Arthritis Brother         psoriatic   • DVT Brother    • Psoriasis Brother    • Heart Disease Brother      Allergies   Allergen Reactions   • Asa [Aspirin] Vomiting and Nausea   • Codeine Vomiting and Nausea   • Latex      \"irritates my skin\"         Medications, Allergies, and current problem list reviewed today in Epic    Review of Systems   Constitutional: Negative for chills, fever and malaise/fatigue.   HENT: Positive for congestion. Negative for ear pain, rhinorrhea, sinus pain and sore throat.    Respiratory: Positive for cough and wheezing. Negative for hemoptysis, sputum production and shortness of breath.    Cardiovascular: Negative for chest pain and leg swelling.   Gastrointestinal: Negative for abdominal pain, diarrhea, nausea and vomiting.   Musculoskeletal: Negative for myalgias.   Skin: Negative for rash.   Neurological: Negative for headaches.     All other systems reviewed and are negative.            Objective     /88   Pulse 90   Temp 36.4 °C (97.6 °F) (Temporal)   Resp 20   Ht 1.626 m (5' 4\")   Wt (!) 135 kg (297 lb)   SpO2 96%   BMI 50.98 kg/m²      Physical Exam  Constitutional:       General: She is not in acute distress.     Appearance: She is not ill-appearing.   HENT:      Head: Normocephalic and atraumatic.      Nose: Nose normal.      Mouth/Throat:      Mouth: Mucous membranes are moist.      Pharynx: No posterior oropharyngeal erythema.   Eyes:      Conjunctiva/sclera: Conjunctivae normal.   Cardiovascular:      Rate and Rhythm: Normal rate and regular rhythm.      Heart sounds: Normal heart sounds.   Pulmonary:      Effort: Pulmonary effort is normal. No respiratory distress.      Breath sounds: No stridor. Wheezing and rhonchi present. No rales.      " Comments: Diffuse rhonchi and wheezes   Chest:      Chest wall: No tenderness.   Skin:     General: Skin is warm and dry.   Neurological:      General: No focal deficit present.      Mental Status: She is alert and oriented to person, place, and time.   Psychiatric:         Mood and Affect: Mood normal.         Behavior: Behavior normal.         Thought Content: Thought content normal.         Judgment: Judgment normal.                             Assessment & Plan        1. RTI (respiratory tract infection)    - doxycycline (VIBRAMYCIN) 100 MG Tab; Take 1 Tablet by mouth 2 times a day for 7 days.  Dispense: 14 Tablet; Refill: 0  - methylPREDNISolone (MEDROL DOSEPAK) 4 MG Tablet Therapy Pack; Follow schedule on package instructions.  Dispense: 21 Tablet; Refill: 0  - benzonatate (TESSALON) 200 MG capsule; Take 1 Capsule by mouth 3 times a day as needed for Cough.  Dispense: 30 Capsule; Refill: 0         Differential diagnoses, Supportive care, and indications for immediate follow-up discussed with patient.   Pathogenesis of diagnosis discussed including typical length and natural progression.   Instructed to return to clinic or nearest emergency department for any change in condition, further concerns, or worsening of symptoms.    The patient demonstrated a good understanding and agreed with the treatment plan.    Martha Madrigal P.A.-C.

## 2022-07-13 ENCOUNTER — APPOINTMENT (OUTPATIENT)
Dept: RADIOLOGY | Facility: MEDICAL CENTER | Age: 57
End: 2022-07-13
Attending: EMERGENCY MEDICINE
Payer: COMMERCIAL

## 2022-07-13 ENCOUNTER — HOSPITAL ENCOUNTER (EMERGENCY)
Facility: MEDICAL CENTER | Age: 57
End: 2022-07-13
Attending: EMERGENCY MEDICINE
Payer: COMMERCIAL

## 2022-07-13 VITALS
BODY MASS INDEX: 50.02 KG/M2 | HEART RATE: 82 BPM | TEMPERATURE: 97.9 F | DIASTOLIC BLOOD PRESSURE: 80 MMHG | HEIGHT: 64 IN | OXYGEN SATURATION: 96 % | SYSTOLIC BLOOD PRESSURE: 128 MMHG | RESPIRATION RATE: 16 BRPM | WEIGHT: 293 LBS

## 2022-07-13 DIAGNOSIS — R07.9 CHEST PAIN, UNSPECIFIED TYPE: ICD-10-CM

## 2022-07-13 LAB
ALBUMIN SERPL BCP-MCNC: 4.6 G/DL (ref 3.2–4.9)
ALBUMIN/GLOB SERPL: 1.6 G/DL
ALP SERPL-CCNC: 100 U/L (ref 30–99)
ALT SERPL-CCNC: 19 U/L (ref 2–50)
ANION GAP SERPL CALC-SCNC: 12 MMOL/L (ref 7–16)
AST SERPL-CCNC: 19 U/L (ref 12–45)
BASOPHILS # BLD AUTO: 0.5 % (ref 0–1.8)
BASOPHILS # BLD: 0.06 K/UL (ref 0–0.12)
BILIRUB SERPL-MCNC: 0.4 MG/DL (ref 0.1–1.5)
BUN SERPL-MCNC: 19 MG/DL (ref 8–22)
CALCIUM SERPL-MCNC: 9.6 MG/DL (ref 8.5–10.5)
CHLORIDE SERPL-SCNC: 104 MMOL/L (ref 96–112)
CO2 SERPL-SCNC: 24 MMOL/L (ref 20–33)
CREAT SERPL-MCNC: 0.76 MG/DL (ref 0.5–1.4)
EKG IMPRESSION: NORMAL
EOSINOPHIL # BLD AUTO: 0.26 K/UL (ref 0–0.51)
EOSINOPHIL NFR BLD: 2.4 % (ref 0–6.9)
ERYTHROCYTE [DISTWIDTH] IN BLOOD BY AUTOMATED COUNT: 41.5 FL (ref 35.9–50)
GFR SERPLBLD CREATININE-BSD FMLA CKD-EPI: 90 ML/MIN/1.73 M 2
GLOBULIN SER CALC-MCNC: 2.9 G/DL (ref 1.9–3.5)
GLUCOSE SERPL-MCNC: 94 MG/DL (ref 65–99)
HCT VFR BLD AUTO: 43.9 % (ref 37–47)
HGB BLD-MCNC: 14.2 G/DL (ref 12–16)
IMM GRANULOCYTES # BLD AUTO: 0.03 K/UL (ref 0–0.11)
IMM GRANULOCYTES NFR BLD AUTO: 0.3 % (ref 0–0.9)
LYMPHOCYTES # BLD AUTO: 2.5 K/UL (ref 1–4.8)
LYMPHOCYTES NFR BLD: 22.9 % (ref 22–41)
MCH RBC QN AUTO: 27.5 PG (ref 27–33)
MCHC RBC AUTO-ENTMCNC: 32.3 G/DL (ref 33.6–35)
MCV RBC AUTO: 84.9 FL (ref 81.4–97.8)
MONOCYTES # BLD AUTO: 0.71 K/UL (ref 0–0.85)
MONOCYTES NFR BLD AUTO: 6.5 % (ref 0–13.4)
NEUTROPHILS # BLD AUTO: 7.37 K/UL (ref 2–7.15)
NEUTROPHILS NFR BLD: 67.4 % (ref 44–72)
NRBC # BLD AUTO: 0 K/UL
NRBC BLD-RTO: 0 /100 WBC
PLATELET # BLD AUTO: 351 K/UL (ref 164–446)
PMV BLD AUTO: 9.4 FL (ref 9–12.9)
POTASSIUM SERPL-SCNC: 4.4 MMOL/L (ref 3.6–5.5)
PROT SERPL-MCNC: 7.5 G/DL (ref 6–8.2)
RBC # BLD AUTO: 5.17 M/UL (ref 4.2–5.4)
SODIUM SERPL-SCNC: 140 MMOL/L (ref 135–145)
TROPONIN T SERPL-MCNC: 9 NG/L (ref 6–19)
WBC # BLD AUTO: 10.9 K/UL (ref 4.8–10.8)

## 2022-07-13 PROCEDURE — 84484 ASSAY OF TROPONIN QUANT: CPT

## 2022-07-13 PROCEDURE — 85025 COMPLETE CBC W/AUTO DIFF WBC: CPT

## 2022-07-13 PROCEDURE — 99283 EMERGENCY DEPT VISIT LOW MDM: CPT

## 2022-07-13 PROCEDURE — 80053 COMPREHEN METABOLIC PANEL: CPT

## 2022-07-13 PROCEDURE — 71045 X-RAY EXAM CHEST 1 VIEW: CPT

## 2022-07-13 PROCEDURE — 93005 ELECTROCARDIOGRAM TRACING: CPT

## 2022-07-13 PROCEDURE — 93005 ELECTROCARDIOGRAM TRACING: CPT | Performed by: EMERGENCY MEDICINE

## 2022-07-13 ASSESSMENT — FIBROSIS 4 INDEX: FIB4 SCORE: 0.74

## 2022-07-13 NOTE — ED TRIAGE NOTES
"Chief Complaint   Patient presents with   • Chest Pressure     Pt had chest pressure lasting 15 minutes this morning. Has resolved. Pt says it radiated to her upper back. No cardiac hx. Denies N/V. Denies numbness/tingling.      R BP= 167/115, L BP= 171/115    Denies any SOB. Pt ambulatory. Protocol ordered. Pt educated to inform staff of any changes.    BP (!) 172/116   Pulse 96   Temp 36.4 °C (97.6 °F) (Temporal)   Resp 18   Ht 1.626 m (5' 4\")   Wt (!) 133 kg (294 lb 1.5 oz)   SpO2 96%   BMI 50.48 kg/m²     "

## 2022-07-14 ENCOUNTER — HOSPITAL ENCOUNTER (OUTPATIENT)
Dept: LAB | Facility: MEDICAL CENTER | Age: 57
End: 2022-07-14
Attending: NURSE PRACTITIONER
Payer: COMMERCIAL

## 2022-07-14 DIAGNOSIS — E78.5 DYSLIPIDEMIA: ICD-10-CM

## 2022-07-14 DIAGNOSIS — Z00.00 ENCOUNTER FOR WELL ADULT EXAM WITHOUT ABNORMAL FINDINGS: ICD-10-CM

## 2022-07-14 DIAGNOSIS — Z00.00 ROUTINE HEALTH MAINTENANCE: ICD-10-CM

## 2022-07-14 DIAGNOSIS — E66.01 MORBID OBESITY WITH BMI OF 50.0-59.9, ADULT (HCC): ICD-10-CM

## 2022-07-14 DIAGNOSIS — L20.84 INTRINSIC ECZEMA: ICD-10-CM

## 2022-07-14 DIAGNOSIS — J30.9 CHRONIC ALLERGIC RHINITIS: ICD-10-CM

## 2022-07-14 DIAGNOSIS — E55.9 VITAMIN D DEFICIENCY: ICD-10-CM

## 2022-07-14 DIAGNOSIS — E89.0 POSTOPERATIVE HYPOTHYROIDISM: ICD-10-CM

## 2022-07-14 DIAGNOSIS — R73.03 PREDIABETES: ICD-10-CM

## 2022-07-14 LAB
25(OH)D3 SERPL-MCNC: 55 NG/ML (ref 30–100)
ALBUMIN SERPL BCP-MCNC: 4.3 G/DL (ref 3.2–4.9)
ALBUMIN/GLOB SERPL: 1.4 G/DL
ALP SERPL-CCNC: 93 U/L (ref 30–99)
ALT SERPL-CCNC: 15 U/L (ref 2–50)
ANION GAP SERPL CALC-SCNC: 12 MMOL/L (ref 7–16)
AST SERPL-CCNC: 14 U/L (ref 12–45)
BASOPHILS # BLD AUTO: 0.8 % (ref 0–1.8)
BASOPHILS # BLD: 0.07 K/UL (ref 0–0.12)
BILIRUB SERPL-MCNC: 0.5 MG/DL (ref 0.1–1.5)
BUN SERPL-MCNC: 19 MG/DL (ref 8–22)
CALCIUM SERPL-MCNC: 9.6 MG/DL (ref 8.5–10.5)
CHLORIDE SERPL-SCNC: 102 MMOL/L (ref 96–112)
CHOLEST SERPL-MCNC: 156 MG/DL (ref 100–199)
CO2 SERPL-SCNC: 23 MMOL/L (ref 20–33)
CREAT SERPL-MCNC: 0.75 MG/DL (ref 0.5–1.4)
EOSINOPHIL # BLD AUTO: 0.28 K/UL (ref 0–0.51)
EOSINOPHIL NFR BLD: 3.1 % (ref 0–6.9)
ERYTHROCYTE [DISTWIDTH] IN BLOOD BY AUTOMATED COUNT: 42 FL (ref 35.9–50)
EST. AVERAGE GLUCOSE BLD GHB EST-MCNC: 120 MG/DL
FASTING STATUS PATIENT QL REPORTED: NORMAL
GFR SERPLBLD CREATININE-BSD FMLA CKD-EPI: 92 ML/MIN/1.73 M 2
GLOBULIN SER CALC-MCNC: 3 G/DL (ref 1.9–3.5)
GLUCOSE SERPL-MCNC: 97 MG/DL (ref 65–99)
HBA1C MFR BLD: 5.8 % (ref 4–5.6)
HCT VFR BLD AUTO: 45.1 % (ref 37–47)
HDLC SERPL-MCNC: 38 MG/DL
HGB BLD-MCNC: 14.4 G/DL (ref 12–16)
IMM GRANULOCYTES # BLD AUTO: 0.04 K/UL (ref 0–0.11)
IMM GRANULOCYTES NFR BLD AUTO: 0.4 % (ref 0–0.9)
LDLC SERPL CALC-MCNC: 99 MG/DL
LYMPHOCYTES # BLD AUTO: 2.2 K/UL (ref 1–4.8)
LYMPHOCYTES NFR BLD: 24.2 % (ref 22–41)
MCH RBC QN AUTO: 27.5 PG (ref 27–33)
MCHC RBC AUTO-ENTMCNC: 31.9 G/DL (ref 33.6–35)
MCV RBC AUTO: 86.1 FL (ref 81.4–97.8)
MONOCYTES # BLD AUTO: 0.69 K/UL (ref 0–0.85)
MONOCYTES NFR BLD AUTO: 7.6 % (ref 0–13.4)
NEUTROPHILS # BLD AUTO: 5.82 K/UL (ref 2–7.15)
NEUTROPHILS NFR BLD: 63.9 % (ref 44–72)
NRBC # BLD AUTO: 0 K/UL
NRBC BLD-RTO: 0 /100 WBC
PLATELET # BLD AUTO: 338 K/UL (ref 164–446)
PMV BLD AUTO: 10.3 FL (ref 9–12.9)
POTASSIUM SERPL-SCNC: 4.1 MMOL/L (ref 3.6–5.5)
PROT SERPL-MCNC: 7.3 G/DL (ref 6–8.2)
RBC # BLD AUTO: 5.24 M/UL (ref 4.2–5.4)
SODIUM SERPL-SCNC: 137 MMOL/L (ref 135–145)
TRIGL SERPL-MCNC: 93 MG/DL (ref 0–149)
TSH SERPL DL<=0.005 MIU/L-ACNC: 0.73 UIU/ML (ref 0.38–5.33)
WBC # BLD AUTO: 9.1 K/UL (ref 4.8–10.8)

## 2022-07-14 PROCEDURE — 83036 HEMOGLOBIN GLYCOSYLATED A1C: CPT

## 2022-07-14 PROCEDURE — 82306 VITAMIN D 25 HYDROXY: CPT

## 2022-07-14 PROCEDURE — 80053 COMPREHEN METABOLIC PANEL: CPT

## 2022-07-14 PROCEDURE — 84443 ASSAY THYROID STIM HORMONE: CPT

## 2022-07-14 PROCEDURE — 85025 COMPLETE CBC W/AUTO DIFF WBC: CPT

## 2022-07-14 PROCEDURE — 36415 COLL VENOUS BLD VENIPUNCTURE: CPT

## 2022-07-14 PROCEDURE — 80061 LIPID PANEL: CPT

## 2022-07-14 NOTE — DISCHARGE INSTRUCTIONS
Your tests today show no signs of heart attack or other dangerous condition.  However sometimes these can develop even with normal tests.  Please follow-up as directed below, seek more immediate medical attention for any new chest pain, difficulty breathing, passing out or any other concerns.  Additionally would advise taking aspirin 81 mg every day until your follow-up appointment

## 2022-07-25 ENCOUNTER — OFFICE VISIT (OUTPATIENT)
Dept: MEDICAL GROUP | Facility: PHYSICIAN GROUP | Age: 57
End: 2022-07-25
Payer: COMMERCIAL

## 2022-07-25 VITALS
TEMPERATURE: 97.4 F | HEIGHT: 64 IN | SYSTOLIC BLOOD PRESSURE: 130 MMHG | BODY MASS INDEX: 49.17 KG/M2 | OXYGEN SATURATION: 96 % | WEIGHT: 288 LBS | HEART RATE: 84 BPM | DIASTOLIC BLOOD PRESSURE: 74 MMHG

## 2022-07-25 DIAGNOSIS — E66.01 MORBID OBESITY WITH BMI OF 50.0-59.9, ADULT (HCC): ICD-10-CM

## 2022-07-25 DIAGNOSIS — Z12.31 ENCOUNTER FOR SCREENING MAMMOGRAM FOR BREAST CANCER: ICD-10-CM

## 2022-07-25 DIAGNOSIS — E78.5 DYSLIPIDEMIA: ICD-10-CM

## 2022-07-25 DIAGNOSIS — N95.0 POSTMENOPAUSAL BLEEDING: ICD-10-CM

## 2022-07-25 DIAGNOSIS — Z00.00 ROUTINE HEALTH MAINTENANCE: ICD-10-CM

## 2022-07-25 DIAGNOSIS — E89.0 POSTOPERATIVE HYPOTHYROIDISM: ICD-10-CM

## 2022-07-25 DIAGNOSIS — Z12.11 SCREENING FOR COLORECTAL CANCER: ICD-10-CM

## 2022-07-25 DIAGNOSIS — Z23 NEED FOR VACCINATION: ICD-10-CM

## 2022-07-25 DIAGNOSIS — Z00.01 ENCOUNTER FOR WELL ADULT EXAM WITH ABNORMAL FINDINGS: ICD-10-CM

## 2022-07-25 DIAGNOSIS — E55.9 VITAMIN D DEFICIENCY: ICD-10-CM

## 2022-07-25 DIAGNOSIS — Z12.12 SCREENING FOR COLORECTAL CANCER: ICD-10-CM

## 2022-07-25 DIAGNOSIS — R73.03 PREDIABETES: ICD-10-CM

## 2022-07-25 PROCEDURE — 90471 IMMUNIZATION ADMIN: CPT | Performed by: NURSE PRACTITIONER

## 2022-07-25 PROCEDURE — 99396 PREV VISIT EST AGE 40-64: CPT | Mod: 25 | Performed by: NURSE PRACTITIONER

## 2022-07-25 PROCEDURE — 90715 TDAP VACCINE 7 YRS/> IM: CPT | Performed by: NURSE PRACTITIONER

## 2022-07-25 RX ORDER — LEVOTHYROXINE SODIUM 175 UG/1
175 TABLET ORAL
Qty: 90 TABLET | Refills: 3 | Status: SHIPPED | OUTPATIENT
Start: 2022-07-25 | End: 2023-07-20

## 2022-07-25 ASSESSMENT — FIBROSIS 4 INDEX: FIB4 SCORE: 0.63

## 2022-07-25 ASSESSMENT — PATIENT HEALTH QUESTIONNAIRE - PHQ9: CLINICAL INTERPRETATION OF PHQ2 SCORE: 0

## 2022-07-25 NOTE — ASSESSMENT & PLAN NOTE
Chronic, ongoing without medication.  Is trying to walk a couple of times per week and is now following a diet to help with insulin resistance.  Due for annual labs in July 2023.  The 10-year ASCVD risk score ([place] KARIME Jr., et al., 2013) is: 3.3%

## 2022-07-25 NOTE — ASSESSMENT & PLAN NOTE
Continues levothyroxine 175 mcg daily, denies side effects of the medication.  Due for annual labs in July 2023.

## 2022-07-25 NOTE — ASSESSMENT & PLAN NOTE
New to examiner.  Patient reports that over the last 6 months she has had vaginal bleeding/spotting that we will either look like blood or tissue material every couple of weeks.  She continues over-the-counter evening primrose oil and since starting the supplement has not had regular periods.  She does not have any cramping or other symptoms associated with the spotting.  Maternal grandmother  young with cervical cancer.  Patient's last Pap smear completed in 2018.  Does not currently follow with gynecology.

## 2022-07-25 NOTE — PROGRESS NOTES
Subjective:     CC:   Chief Complaint   Patient presents with   • Annual Exam     HPI:   Antonella Frausto is a 57 y.o. female who presents for annual exam    Postmenopausal bleeding  New to examiner.  Patient reports that over the last 6 months she has had vaginal bleeding/spotting that we will either look like blood or tissue material every couple of weeks.  She continues over-the-counter evening primrose oil and since starting the supplement has not had regular periods.  She does not have any cramping or other symptoms associated with the spotting.  Maternal grandmother  young with cervical cancer.  Patient's last Pap smear completed in .  Does not currently follow with gynecology.    Dyslipidemia  Chronic, ongoing without medication.  Is trying to walk a couple of times per week and is now following a diet to help with insulin resistance.  Due for annual labs in 2023.  The 10-year ASCVD risk score (Torreyedy SCHAFFER Jr., et al., 2013) is: 3.3%     Postoperative hypothyroidism  Continues levothyroxine 175 mcg daily, denies side effects of the medication.  Due for annual labs in 2023.     Patient has GYN provider: Yes  Last Pap Smear: 2018, repeat due    H/O Abnormal Pap: No  Last Mammogram: 2018  Last Bone Density Test: NA  Last Colorectal Cancer Screening: NA  Last Tdap: Unknown  Received HPV series: Aged out    Exercise: Trying to exercise, walking a couple of times per week.  Diet: Started GoLo diet, based on insulin resistance, recommendations on amounts of carbs, protein, vegetables.      No LMP recorded. Patient is perimenopausal.  She has not utilized hormone replacement therapy.  Reports hot flashes and night sweats, using evening primrose oil to manage symptoms.   No significant bloating/fluid retention, pelvic pain, or dyspareunia. Positive for abnormal vaginal discharge- spotting or tissue.   No breast tenderness, mass, nipple discharge or changes in size or contour.    OB History     Para Term  AB Living   4 3 3 0 1 3   SAB IAB Ectopic Molar Multiple Live Births   1 0 0 0 0 3      She  reports being sexually active and has had partner(s) who are male.  She  has a past medical history of Arthritis, Bronchitis (12-15-14), Hypothyroidism, Increased BMI, Pain (14), and Venous thrombosis (2018).  She  has a past surgical history that includes cholecystectomy (); gyn surgery; thyroidectomy total (10/20/2010); node biopsy (10/20/2010); knee arthroscopy (2014); meniscectomy, knee, medial (2014); and chondroplasty (2014).    Family History   Problem Relation Age of Onset   • Cancer Mother         Thyroid ca, lymphoma   • Thyroid Mother    • Heart Disease Father    • Heart Failure Father    • No Known Problems Sister    • Heart Failure Brother    • Cancer Maternal Grandmother         Cervical Cancer   • No Known Problems Maternal Grandfather    • Dementia Paternal Grandmother    • Parkinson's Disease Paternal Grandmother    • Cancer Paternal Grandfather         Throat and liver   • Arthritis Brother         psoriatic   • DVT Brother    • Psoriasis Brother    • Heart Disease Brother      Social History     Tobacco Use   • Smoking status: Never Smoker   • Smokeless tobacco: Never Used   Vaping Use   • Vaping Use: Never used   Substance Use Topics   • Alcohol use: No   • Drug use: No     Patient Active Problem List    Diagnosis Date Noted   • Postmenopausal bleeding 2022   • Intrinsic eczema 2021   • Prediabetes 2020   • Shortness of breath 2019   • Chronic allergic rhinitis 2018   • Morbid obesity with BMI of 50.0-59.9, adult ([facility]) 2018   • Vitamin D deficiency 2017   • Osteoarthritis of foot 07/10/2013   • Postoperative hypothyroidism 2012   • Dyslipidemia 2012     Current Outpatient Medications   Medication Sig Dispense Refill   • Zoster Vac Recomb Adjuvanted (SHINGRIX) 50 MCG/0.5ML Recon Susp Inject  "0.5 mL into the shoulder, thigh, or buttocks one time for 1 dose. 0.5 mL 0   • levothyroxine (SYNTHROID) 175 MCG Tab Take 1 Tablet by mouth every morning on an empty stomach. 90 Tablet 3   • triamcinolone acetonide (KENALOG) 0.5 % Cream Apply 1 g topically 2 times a day as needed (eczema). 15 g 6   • aspirin EC (ECOTRIN) 81 MG Tablet Delayed Response Take 81 mg by mouth every day.     • Cholecalciferol (VITAMIN D3) 2000 UNIT CAPS Take 4,000 Units by mouth every day.     • cetirizine (ZYRTEC) 10 MG TABS Take 10 mg by mouth every day.     • Evening Primrose OIL Take 1 Tab by mouth every morning.       No current facility-administered medications for this visit.     Allergies   Allergen Reactions   • Asa [Aspirin] Vomiting and Nausea   • Codeine Vomiting and Nausea   • Latex      \"irritates my skin\"     Review of Systems   Constitutional: Negative for fever, chills and malaise/fatigue.   HENT: Negative for congestion.    Eyes: Negative for pain.   Respiratory: Negative for cough and shortness of breath.    Cardiovascular: Negative for chest pain and leg swelling.   Gastrointestinal: Negative for nausea, vomiting, abdominal pain and diarrhea.   Genitourinary: Negative for dysuria and hematuria.   Skin: Negative for rash.   Neurological: Negative for dizziness, focal weakness and headaches.   Endo/Heme/Allergies: Does not bruise/bleed easily.   Psychiatric/Behavioral: Negative for depression.  The patient is not nervous/anxious.      Objective:   /74 (BP Location: Left arm, Patient Position: Sitting, BP Cuff Size: Large adult)   Pulse 84   Temp 36.3 °C (97.4 °F) (Temporal)   Ht 1.626 m (5' 4\")   Wt (!) 131 kg (288 lb)   SpO2 96%   BMI 49.44 kg/m²     Wt Readings from Last 4 Encounters:   07/25/22 (!) 131 kg (288 lb)   07/13/22 (!) 133 kg (294 lb 1.5 oz)   03/27/22 (!) 135 kg (297 lb)   01/07/22 (!) 135 kg (297 lb)     Physical Exam:  Constitutional: Well-developed and well-nourished. Not diaphoretic. No " distress.   Skin: Skin is warm and dry. No rash noted.  Head: Atraumatic without lesions.  Eyes: Conjunctivae and extraocular motions are normal. Pupils are equal, round, and reactive to light. No scleral icterus.   Ears:  External ears unremarkable. Tympanic membranes clear and intact.  Nose: Nares patent. Septum midline. Turbinates without erythema nor edema. No discharge.   Mouth/Throat: Tongue normal. Oropharynx is clear and moist. Posterior pharynx without erythema or exudates.  Neck: Supple, trachea midline. Normal range of motion. No thyromegaly present. No lymphadenopathy--cervical or supraclavicular.  Cardiovascular: Regular rate and rhythm, S1 and S2 without murmur, rubs, or gallops.    Respiratory: Effort normal. Clear to auscultation throughout. No adventitious sounds.   Breast:  Breast exam deferred. Discussed monthly self exams and what to look for, including peau d'orange or nipple retraction, discharge, breasts moving freely and equally without restriction, axillary/supraclavicular adenopathy, or palpable masses/nodules.  Abdomen: Soft, non tender, and without distention. Active bowel sounds in all four quadrants. No rebound, guarding.  Extremities: No cyanosis, clubbing, erythema, nor edema. Radial pulses intact and symmetric.   Musculoskeletal: All major joints AROM full in all directions without pain.  Neurological: Alert and oriented x 3. Grossly non-focal. Strength and sensation grossly intact.    Psychiatric:  Behavior, mood, and affect are appropriate.    Assessment and Plan:   1. Encounter for well adult exam with abnormal findings    2. Morbid obesity with BMI of 50.0-59.9, adult (HCC)  Encourage diet high in fruits, vegetables, and fiber. And a diet low in salt, refined carbohydrates, cholesterol, saturated fat, and trans fatty acids.    Encourage a minimum of 30 minutes of moderate intensity aerobic exercise (eg, brisk walking) is recommended on five days each week. Or 30 minutes of  vigorous-intensity aerobic exercise (eg, jogging) on three days each week.   Patient's body mass index is 49.44 kg/m². Exercise and nutrition counseling were performed at this visit.  Due for updated annual labs in July 2023.  - HEMOGLOBIN A1C; Future  - CBC WITH DIFFERENTIAL; Future  - Comp Metabolic Panel; Future  - Lipid Profile; Future  - TSH WITH REFLEX TO FT4; Future    3. Postmenopausal bleeding  New to examiner.  Patient reports intermittent spotting every couple of weeks for the last 6 months.  She takes over-the-counter evening primrose oil and is not positive when her last menstrual period was.  Plan to have patient complete a pelvic complete transabdominal/transvaginal ultrasound and referral to gynecology for further evaluation.  - Referral to Gynecology  - US-PELVIC COMPLETE (TRANSABDOMINAL/TRANSVAGINAL) (COMBO); Future    4. Postoperative hypothyroidism  Continue levothyroxine 175 mcg daily, refill sent to pharmacy.  Patient to follow-up sooner with any concerning symptoms such as diarrhea, unplanned weight loss, palpitations, etc.  Otherwise, due for annual labs in July 2023.  - TSH WITH REFLEX TO FT4; Future  - levothyroxine (SYNTHROID) 175 MCG Tab; Take 1 Tablet by mouth every morning on an empty stomach.  Dispense: 90 Tablet; Refill: 3    5. Dyslipidemia  Ongoing without medication.  Encouraged healthy diet, regular exercise, weight loss.  Due for annual labs in July 2023.  - HEMOGLOBIN A1C; Future  - Comp Metabolic Panel; Future  - Lipid Profile; Future  - TSH WITH REFLEX TO FT4; Future    6. Prediabetes  Due for annual labs in July 2023.  - HEMOGLOBIN A1C; Future  - Comp Metabolic Panel; Future  - Lipid Profile; Future  - MICROALBUMIN CREAT RATIO URINE; Future    7. Vitamin D deficiency  Continue over-the-counter vitamin D 4000 units daily, due for annual labs in July 2023.  - VITAMIN D,25 HYDROXY; Future    8. Routine health maintenance  Due for annual labs in July 2023.  - HEMOGLOBIN A1C;  Future  - CBC WITH DIFFERENTIAL; Future  - Comp Metabolic Panel; Future  - Lipid Profile; Future  - TSH WITH REFLEX TO FT4; Future  - VITAMIN D,25 HYDROXY; Future  - MICROALBUMIN CREAT RATIO URINE; Future    9. Screening for colorectal cancer  Due for screening.  - Referral to GI for Colonoscopy    10. Encounter for screening mammogram for breast cancer  Due for screening.  - MA-SCREENING MAMMO BILAT W/CAD; Future    11. Need for vaccination  Tdap given today.  Shingrix prescription sent to pharmacy for administration.  - Tdap Vaccine =>8YO IM  - Zoster Vac Recomb Adjuvanted (SHINGRIX) 50 MCG/0.5ML Recon Susp; Inject 0.5 mL into the shoulder, thigh, or buttocks one time for 1 dose.  Dispense: 0.5 mL; Refill: 0     I have placed the below orders and discussed them with an approved delegating provider.  The MA is performing the below orders under the direction of Dr. Carpenter.     Health maintenance: Up-to-date  Labs per orders  Immunizations per orders  Patient counseled about skin care, diet, supplements, and exercise.  Discussed  breast self exam, mammography screening, menopause, osteoporosis, adequate intake of calcium and vitamin D, diet and exercise, colorectal cancer screening     Follow-up: Return in about 1 year (around 7/25/2023) for Preventative Annual, Follow up Labs, As needed.     Please note that this dictation was created using voice recognition software. I have worked with consultants from the vendor as well as technical experts from [employer] to optimize the interface. I have made every reasonable attempt to correct obvious errors, but I expect that there are errors of grammar and possibly content that I did not discover before finalizing the note.

## 2022-08-22 ENCOUNTER — HOSPITAL ENCOUNTER (OUTPATIENT)
Dept: RADIOLOGY | Facility: MEDICAL CENTER | Age: 57
End: 2022-08-22
Attending: NURSE PRACTITIONER
Payer: COMMERCIAL

## 2022-08-22 DIAGNOSIS — Z12.31 ENCOUNTER FOR SCREENING MAMMOGRAM FOR BREAST CANCER: ICD-10-CM

## 2022-08-22 DIAGNOSIS — N95.0 POSTMENOPAUSAL BLEEDING: ICD-10-CM

## 2022-08-22 PROCEDURE — 76830 TRANSVAGINAL US NON-OB: CPT

## 2022-08-22 PROCEDURE — 77063 BREAST TOMOSYNTHESIS BI: CPT

## 2022-08-27 DIAGNOSIS — N95.0 ABNORMAL VAGINAL BLEEDING WITH ENDOMETRIAL THICKNESS GREATER THAN 5 MM PRESENT ON TRANSVAGINAL ULTRASOUND IN POSTMENOPAUSAL PATIENT: ICD-10-CM

## 2022-08-27 DIAGNOSIS — N95.0 POSTMENOPAUSAL BLEEDING: ICD-10-CM

## 2022-08-27 DIAGNOSIS — D25.9 UTERINE LEIOMYOMA, UNSPECIFIED LOCATION: ICD-10-CM

## 2022-08-27 DIAGNOSIS — R93.89 ABNORMAL VAGINAL BLEEDING WITH ENDOMETRIAL THICKNESS GREATER THAN 5 MM PRESENT ON TRANSVAGINAL ULTRASOUND IN POSTMENOPAUSAL PATIENT: ICD-10-CM

## 2022-08-27 NOTE — PROGRESS NOTES
1. Postmenopausal bleeding  - Referral to Gynecology    2. Abnormal vaginal bleeding with endometrial thickness greater than 5 mm present on transvaginal ultrasound in postmenopausal patient  - Referral to Gynecology    3. Uterine leiomyoma, unspecified location  - Referral to Gynecology

## 2022-11-16 NOTE — H&P
DATE OF ADMISSION:  2022     CHIEF COMPLAINT:    1.  Thickened endometrial stripe.  2.  Postmenopausal bleeding.  3.  Suspected endometrial polyp.     HISTORY OF PRESENT ILLNESS:  This patient is a 57-year-old  4, para 3 that was referred to see me by her primary care doctor for postmenopausal   bleeding.  The patient states that she has been having on and off   postmenopausal bleeding. The last time she had heavy episode of vaginal   bleeding was in 2022.  The bleeding has been on and off for 6 months and   most recently, she has been spotting.  The patient's primary care doctor   ordered a pelvic ultrasound, which was done on 2022.  The ultrasound showed  that the uterus measured 4.96 x 8.27 x 5.43 cm.  A hypoechoic mass was seen in the anterior lower uterine segment that measured 1.9 x 1.9 x 2.6 cm.  The endometrial stripe was thick at 1.3 cm with small cystic structures.  The   ovaries were not visualized.  The patient was then referred to see me.  She   had a pelvic exam that was normal.  A Pap smear was done on 2022, which was negative for intraepithelial lesion or malignancy and negative HPV.  An endometrial biopsy was also performed, which was negative for hyperplasia or malignancy.  It showed scant strips of endometrial surface epithelium and rare   detached inactive glands, negative for hyperplasia, atypia or malignancy.  At   this time, the patient is scheduled for a hysteroscopy, D and C and   polypectomy with MyoSure.  I have discussed with the patient the reason for   the need for the hysteroscopy and that is because of the postmenopausal   bleeding, thickened endometrial stripe and endometrial mass.  I have reviewed   with the patient the risks, benefits, indications and alternatives to surgery.    She has no unanswered questions and wants to proceed.     PAST MEDICAL HISTORY:    1.  Hypothyroidism.  2.  Fibroid.  3.  Previous superficial phlebitis.    PAST SURGICAL HISTORY:    D & C  Thyroidectomy  Right knee meniscus repair  Laparoscopic cholecystectomy     MEDICATIONS:  She is on levothyroxine 175 mcg 1 p.o. every day.     ALLERGIES:  CODEINE AND ASPIRIN.     OBSTETRICAL HISTORY:  She is a  4, para 3.  She had 3 normal   spontaneous vaginal deliveries at term.  First pregnancy was a missed AB.  The   patient had D and C.      GYNECOLOGIC HISTORY:  The patient started menstruating at age 10.  She stopped  menstruating at age 48, but within the last 6 months, she has been having postmenopausal bleeding.  Currently, she is spotting.  Her most current Pap smear 2022 was negative Pap, negative HPV.  No history of STDs.     SOCIAL HISTORY:  The patient is .  Denies tobacco, alcohol or drug use.     PHYSICAL EXAMINATION:    VITAL SIGNS:  Blood pressure 149/91, heart rate is 91.  GENERAL:  Pleasant female in no acute distress.  LUNGS:  Clear to auscultation bilaterally.  CARDIOVASCULAR:  Regular rate and rhythm.  No murmur.  ABDOMEN:  Soft, obese, nontender, nondistended.  EXTREMITIES:  No calf tenderness.  GENITOURINARY:  Normal external female genitalia.  Vagina without any lesions or discharge.  Cervix, no lesions or discharge.  Anteverted uterus about   6-week size without any adnexal masses or tenderness.  EXTREMITIES:  No calf tenderness.     LABORATORY DATA:  Pending a CBC.  Endometrial biopsy negative for hyperplasia or malignancy.  Pelvic ultrasound as above with a thickened endometrial stripe and suspected endometrial mass.     ASSESSMENT AND PLAN:  A 57-year-old  4, para 3.  1.  Postmenopausal bleeding/thickened endometrial stripe/endometrial mass.    The patient had a negative endometrial biopsy.  She had a negative Pap smear,   normal pelvic exam. She is scheduled for a hysteroscopy, polypectomy and D and  C with MyoSure.  I have discussed with the patient the risks of surgery   including infection, bleeding, damage to adjacent organs like bowel, bladder,    ureter or major blood vessel.  She has no unanswered questions and wants to   proceed.  2.  Morbidly obese.  3.  Hypothyroidism, euthyroid, on her levothyroxine.        ______________________________  MD TABITHA OJNES/LIV    DD:  11/15/2022 19:52  DT:  11/15/2022 22:14    Job#:  [id number]

## 2022-11-18 ENCOUNTER — ANESTHESIA (OUTPATIENT)
Dept: SURGERY | Facility: MEDICAL CENTER | Age: 57
End: 2022-11-18
Payer: COMMERCIAL

## 2022-11-18 ENCOUNTER — ANESTHESIA EVENT (OUTPATIENT)
Dept: SURGERY | Facility: MEDICAL CENTER | Age: 57
End: 2022-11-18
Payer: COMMERCIAL

## 2022-11-18 ENCOUNTER — HOSPITAL ENCOUNTER (OUTPATIENT)
Facility: MEDICAL CENTER | Age: 57
End: 2022-11-18
Attending: OBSTETRICS & GYNECOLOGY | Admitting: OBSTETRICS & GYNECOLOGY
Payer: COMMERCIAL

## 2022-11-18 VITALS
HEIGHT: 64 IN | TEMPERATURE: 97.5 F | WEIGHT: 282.19 LBS | RESPIRATION RATE: 16 BRPM | BODY MASS INDEX: 48.18 KG/M2 | OXYGEN SATURATION: 90 % | HEART RATE: 74 BPM | DIASTOLIC BLOOD PRESSURE: 68 MMHG | SYSTOLIC BLOOD PRESSURE: 131 MMHG

## 2022-11-18 LAB
ABO + RH BLD: NORMAL
ABO GROUP BLD: NORMAL
ANION GAP SERPL CALC-SCNC: 10 MMOL/L (ref 7–16)
BLD GP AB SCN SERPL QL: NORMAL
BUN SERPL-MCNC: 18 MG/DL (ref 8–22)
CALCIUM SERPL-MCNC: 9.4 MG/DL (ref 8.5–10.5)
CHLORIDE SERPL-SCNC: 105 MMOL/L (ref 96–112)
CO2 SERPL-SCNC: 26 MMOL/L (ref 20–33)
CREAT SERPL-MCNC: 0.67 MG/DL (ref 0.5–1.4)
ERYTHROCYTE [DISTWIDTH] IN BLOOD BY AUTOMATED COUNT: 43.8 FL (ref 35.9–50)
GFR SERPLBLD CREATININE-BSD FMLA CKD-EPI: 100 ML/MIN/1.73 M 2
GLUCOSE SERPL-MCNC: 85 MG/DL (ref 65–99)
HCT VFR BLD AUTO: 41.7 % (ref 37–47)
HGB BLD-MCNC: 13.6 G/DL (ref 12–16)
MCH RBC QN AUTO: 27.6 PG (ref 27–33)
MCHC RBC AUTO-ENTMCNC: 32.6 G/DL (ref 33.6–35)
MCV RBC AUTO: 84.8 FL (ref 81.4–97.8)
PLATELET # BLD AUTO: 335 K/UL (ref 164–446)
PMV BLD AUTO: 9.6 FL (ref 9–12.9)
POTASSIUM SERPL-SCNC: 4 MMOL/L (ref 3.6–5.5)
RBC # BLD AUTO: 4.92 M/UL (ref 4.2–5.4)
RH BLD: NORMAL
SODIUM SERPL-SCNC: 141 MMOL/L (ref 135–145)
WBC # BLD AUTO: 9.8 K/UL (ref 4.8–10.8)

## 2022-11-18 PROCEDURE — 160009 HCHG ANES TIME/MIN: Performed by: OBSTETRICS & GYNECOLOGY

## 2022-11-18 PROCEDURE — 86901 BLOOD TYPING SEROLOGIC RH(D): CPT

## 2022-11-18 PROCEDURE — 88305 TISSUE EXAM BY PATHOLOGIST: CPT

## 2022-11-18 PROCEDURE — 00952 ANES VAG PX HYSTSC&/HSG: CPT | Performed by: EMERGENCY MEDICINE

## 2022-11-18 PROCEDURE — 85027 COMPLETE CBC AUTOMATED: CPT

## 2022-11-18 PROCEDURE — 160002 HCHG RECOVERY MINUTES (STAT): Performed by: OBSTETRICS & GYNECOLOGY

## 2022-11-18 PROCEDURE — 36415 COLL VENOUS BLD VENIPUNCTURE: CPT

## 2022-11-18 PROCEDURE — 700101 HCHG RX REV CODE 250: Performed by: OBSTETRICS & GYNECOLOGY

## 2022-11-18 PROCEDURE — 160029 HCHG SURGERY MINUTES - 1ST 30 MINS LEVEL 4: Performed by: OBSTETRICS & GYNECOLOGY

## 2022-11-18 PROCEDURE — 700111 HCHG RX REV CODE 636 W/ 250 OVERRIDE (IP): Performed by: EMERGENCY MEDICINE

## 2022-11-18 PROCEDURE — 86900 BLOOD TYPING SEROLOGIC ABO: CPT

## 2022-11-18 PROCEDURE — 160035 HCHG PACU - 1ST 60 MINS PHASE I: Performed by: OBSTETRICS & GYNECOLOGY

## 2022-11-18 PROCEDURE — 160025 RECOVERY II MINUTES (STATS): Performed by: OBSTETRICS & GYNECOLOGY

## 2022-11-18 PROCEDURE — 80048 BASIC METABOLIC PNL TOTAL CA: CPT

## 2022-11-18 PROCEDURE — 160046 HCHG PACU - 1ST 60 MINS PHASE II: Performed by: OBSTETRICS & GYNECOLOGY

## 2022-11-18 PROCEDURE — 86850 RBC ANTIBODY SCREEN: CPT

## 2022-11-18 PROCEDURE — 700101 HCHG RX REV CODE 250: Performed by: EMERGENCY MEDICINE

## 2022-11-18 PROCEDURE — 700105 HCHG RX REV CODE 258: Performed by: OBSTETRICS & GYNECOLOGY

## 2022-11-18 PROCEDURE — 160048 HCHG OR STATISTICAL LEVEL 1-5: Performed by: OBSTETRICS & GYNECOLOGY

## 2022-11-18 PROCEDURE — 160041 HCHG SURGERY MINUTES - EA ADDL 1 MIN LEVEL 4: Performed by: OBSTETRICS & GYNECOLOGY

## 2022-11-18 RX ORDER — ROCURONIUM BROMIDE 10 MG/ML
INJECTION, SOLUTION INTRAVENOUS PRN
Status: DISCONTINUED | OUTPATIENT
Start: 2022-11-18 | End: 2022-11-18 | Stop reason: SURG

## 2022-11-18 RX ORDER — HALOPERIDOL 5 MG/ML
1 INJECTION INTRAMUSCULAR
Status: DISCONTINUED | OUTPATIENT
Start: 2022-11-18 | End: 2022-11-18 | Stop reason: HOSPADM

## 2022-11-18 RX ORDER — HYDROMORPHONE HYDROCHLORIDE 1 MG/ML
0.2 INJECTION, SOLUTION INTRAMUSCULAR; INTRAVENOUS; SUBCUTANEOUS
Status: DISCONTINUED | OUTPATIENT
Start: 2022-11-18 | End: 2022-11-18 | Stop reason: HOSPADM

## 2022-11-18 RX ORDER — ONDANSETRON 2 MG/ML
INJECTION INTRAMUSCULAR; INTRAVENOUS PRN
Status: DISCONTINUED | OUTPATIENT
Start: 2022-11-18 | End: 2022-11-18 | Stop reason: SURG

## 2022-11-18 RX ORDER — ONDANSETRON 2 MG/ML
4 INJECTION INTRAMUSCULAR; INTRAVENOUS
Status: DISCONTINUED | OUTPATIENT
Start: 2022-11-18 | End: 2022-11-18 | Stop reason: HOSPADM

## 2022-11-18 RX ORDER — CEFAZOLIN SODIUM 1 G/3ML
INJECTION, POWDER, FOR SOLUTION INTRAMUSCULAR; INTRAVENOUS PRN
Status: DISCONTINUED | OUTPATIENT
Start: 2022-11-18 | End: 2022-11-18 | Stop reason: SURG

## 2022-11-18 RX ORDER — OXYCODONE HYDROCHLORIDE AND ACETAMINOPHEN 5; 325 MG/1; MG/1
1 TABLET ORAL
Status: DISCONTINUED | OUTPATIENT
Start: 2022-11-18 | End: 2022-11-18 | Stop reason: HOSPADM

## 2022-11-18 RX ORDER — MULTIVIT WITH MINERALS/LUTEIN
500 TABLET ORAL DAILY
COMMUNITY

## 2022-11-18 RX ORDER — MAGNESIUM 200 MG
TABLET ORAL DAILY
COMMUNITY

## 2022-11-18 RX ORDER — MIDAZOLAM HYDROCHLORIDE 1 MG/ML
INJECTION INTRAMUSCULAR; INTRAVENOUS PRN
Status: DISCONTINUED | OUTPATIENT
Start: 2022-11-18 | End: 2022-11-18 | Stop reason: SURG

## 2022-11-18 RX ORDER — HYDROMORPHONE HYDROCHLORIDE 1 MG/ML
0.1 INJECTION, SOLUTION INTRAMUSCULAR; INTRAVENOUS; SUBCUTANEOUS
Status: DISCONTINUED | OUTPATIENT
Start: 2022-11-18 | End: 2022-11-18 | Stop reason: HOSPADM

## 2022-11-18 RX ORDER — HYDROMORPHONE HYDROCHLORIDE 1 MG/ML
0.4 INJECTION, SOLUTION INTRAMUSCULAR; INTRAVENOUS; SUBCUTANEOUS
Status: DISCONTINUED | OUTPATIENT
Start: 2022-11-18 | End: 2022-11-18 | Stop reason: HOSPADM

## 2022-11-18 RX ORDER — HYDROMORPHONE HYDROCHLORIDE 2 MG/ML
INJECTION, SOLUTION INTRAMUSCULAR; INTRAVENOUS; SUBCUTANEOUS PRN
Status: DISCONTINUED | OUTPATIENT
Start: 2022-11-18 | End: 2022-11-18 | Stop reason: SURG

## 2022-11-18 RX ORDER — DIPHENHYDRAMINE HYDROCHLORIDE 50 MG/ML
12.5 INJECTION INTRAMUSCULAR; INTRAVENOUS
Status: DISCONTINUED | OUTPATIENT
Start: 2022-11-18 | End: 2022-11-18 | Stop reason: HOSPADM

## 2022-11-18 RX ORDER — HYDRALAZINE HYDROCHLORIDE 20 MG/ML
5 INJECTION INTRAMUSCULAR; INTRAVENOUS
Status: DISCONTINUED | OUTPATIENT
Start: 2022-11-18 | End: 2022-11-18 | Stop reason: HOSPADM

## 2022-11-18 RX ORDER — BUPIVACAINE HYDROCHLORIDE 2.5 MG/ML
INJECTION, SOLUTION EPIDURAL; INFILTRATION; INTRACAUDAL
Status: DISCONTINUED
Start: 2022-11-18 | End: 2022-11-18 | Stop reason: HOSPADM

## 2022-11-18 RX ORDER — MEPERIDINE HYDROCHLORIDE 25 MG/ML
6.25 INJECTION INTRAMUSCULAR; INTRAVENOUS; SUBCUTANEOUS
Status: DISCONTINUED | OUTPATIENT
Start: 2022-11-18 | End: 2022-11-18 | Stop reason: HOSPADM

## 2022-11-18 RX ORDER — BUPIVACAINE HYDROCHLORIDE AND EPINEPHRINE 2.5; 5 MG/ML; UG/ML
INJECTION, SOLUTION EPIDURAL; INFILTRATION; INTRACAUDAL; PERINEURAL
Status: DISCONTINUED | OUTPATIENT
Start: 2022-11-18 | End: 2022-11-18 | Stop reason: HOSPADM

## 2022-11-18 RX ORDER — SODIUM CHLORIDE, SODIUM LACTATE, POTASSIUM CHLORIDE, CALCIUM CHLORIDE 600; 310; 30; 20 MG/100ML; MG/100ML; MG/100ML; MG/100ML
INJECTION, SOLUTION INTRAVENOUS CONTINUOUS
Status: DISCONTINUED | OUTPATIENT
Start: 2022-11-18 | End: 2022-11-18 | Stop reason: HOSPADM

## 2022-11-18 RX ORDER — OXYCODONE HYDROCHLORIDE AND ACETAMINOPHEN 5; 325 MG/1; MG/1
2 TABLET ORAL
Status: DISCONTINUED | OUTPATIENT
Start: 2022-11-18 | End: 2022-11-18 | Stop reason: HOSPADM

## 2022-11-18 RX ORDER — ERGOCALCIFEROL 1.25 MG/1
2000 CAPSULE ORAL DAILY
COMMUNITY

## 2022-11-18 RX ORDER — SODIUM CHLORIDE, SODIUM LACTATE, POTASSIUM CHLORIDE, AND CALCIUM CHLORIDE .6; .31; .03; .02 G/100ML; G/100ML; G/100ML; G/100ML
500 INJECTION, SOLUTION INTRAVENOUS
Status: DISCONTINUED | OUTPATIENT
Start: 2022-11-18 | End: 2022-11-18 | Stop reason: HOSPADM

## 2022-11-18 RX ORDER — B-COMPLEX WITH VITAMIN C
TABLET ORAL DAILY
COMMUNITY

## 2022-11-18 RX ORDER — LABETALOL HYDROCHLORIDE 5 MG/ML
5 INJECTION, SOLUTION INTRAVENOUS
Status: DISCONTINUED | OUTPATIENT
Start: 2022-11-18 | End: 2022-11-18 | Stop reason: HOSPADM

## 2022-11-18 RX ORDER — LIDOCAINE HYDROCHLORIDE 20 MG/ML
INJECTION, SOLUTION EPIDURAL; INFILTRATION; INTRACAUDAL; PERINEURAL PRN
Status: DISCONTINUED | OUTPATIENT
Start: 2022-11-18 | End: 2022-11-18 | Stop reason: SURG

## 2022-11-18 RX ORDER — DEXAMETHASONE SODIUM PHOSPHATE 4 MG/ML
INJECTION, SOLUTION INTRA-ARTICULAR; INTRALESIONAL; INTRAMUSCULAR; INTRAVENOUS; SOFT TISSUE PRN
Status: DISCONTINUED | OUTPATIENT
Start: 2022-11-18 | End: 2022-11-18 | Stop reason: SURG

## 2022-11-18 RX ADMIN — ROCURONIUM BROMIDE 50 MG: 10 INJECTION, SOLUTION INTRAVENOUS at 16:13

## 2022-11-18 RX ADMIN — LIDOCAINE HYDROCHLORIDE 100 MG: 20 INJECTION, SOLUTION EPIDURAL; INFILTRATION; INTRACAUDAL at 16:13

## 2022-11-18 RX ADMIN — MIDAZOLAM HYDROCHLORIDE 2 MG: 1 INJECTION, SOLUTION INTRAMUSCULAR; INTRAVENOUS at 16:10

## 2022-11-18 RX ADMIN — FENTANYL CITRATE 100 MCG: 50 INJECTION, SOLUTION INTRAMUSCULAR; INTRAVENOUS at 16:13

## 2022-11-18 RX ADMIN — SUGAMMADEX 400 MG: 100 INJECTION, SOLUTION INTRAVENOUS at 16:34

## 2022-11-18 RX ADMIN — ONDANSETRON 4 MG: 2 INJECTION INTRAMUSCULAR; INTRAVENOUS at 16:22

## 2022-11-18 RX ADMIN — HYDROMORPHONE HYDROCHLORIDE 0.5 MG: 2 INJECTION INTRAMUSCULAR; INTRAVENOUS; SUBCUTANEOUS at 16:28

## 2022-11-18 RX ADMIN — PROPOFOL 150 MG: 10 INJECTION, EMULSION INTRAVENOUS at 16:13

## 2022-11-18 RX ADMIN — CEFAZOLIN 3 G: 330 INJECTION, POWDER, FOR SOLUTION INTRAMUSCULAR; INTRAVENOUS at 16:20

## 2022-11-18 RX ADMIN — SODIUM CHLORIDE, POTASSIUM CHLORIDE, SODIUM LACTATE AND CALCIUM CHLORIDE: 600; 310; 30; 20 INJECTION, SOLUTION INTRAVENOUS at 16:07

## 2022-11-18 RX ADMIN — DEXAMETHASONE SODIUM PHOSPHATE 4 MG: 4 INJECTION, SOLUTION INTRA-ARTICULAR; INTRALESIONAL; INTRAMUSCULAR; INTRAVENOUS; SOFT TISSUE at 16:22

## 2022-11-18 ASSESSMENT — PAIN SCALES - GENERAL: PAIN_LEVEL: 2

## 2022-11-18 ASSESSMENT — FIBROSIS 4 INDEX: FIB4 SCORE: 0.63

## 2022-11-18 NOTE — ANESTHESIA PREPROCEDURE EVALUATION
Case: [id number] Date/Time: 11/18/22 1445    Procedures:       HYSTEROSCOPY WITH POLYPECTOMY AND DILATION AND CURETTAGE AND ANY OTHER MEDICALLY NECESSARY PROCEDURES      DILATION AND CURETTAGE    Pre-op diagnosis: POSTMENOPAUSAL BLEEDING, FIBROID, THICKENED ENDOMETRIUM    Location: [facility] ROOM 25 / SURGERY SAME DAY [facility]    Surgeons: Amarilys James M.D.          Relevant Problems   PULMONARY   (positive) Shortness of breath      ENDO   (positive) Postoperative hypothyroidism       Physical Exam    Airway   Mallampati: II  TM distance: <3 FB  Neck ROM: full       Cardiovascular - normal exam  Rhythm: regular  Rate: normal  (-) murmur     Dental - normal exam           Pulmonary - normal exam  Breath sounds clear to auscultation     Abdominal   (+) obese     Neurological - normal exam                 Anesthesia Plan    ASA 3   ASA physical status 3 criteria: morbid obesity - BMI greater than or equal to 40    Plan - general       Airway plan will be ETT          Induction: intravenous    Postoperative Plan: Postoperative administration of opioids is intended.    Pertinent diagnostic labs and testing reviewed    Informed Consent:    Anesthetic plan and risks discussed with patient.    Use of blood products discussed with: patient whom consented to blood products.

## 2022-11-19 NOTE — OP REPORT
Immediate Post OP Note     PreOp Diagnosis:Pm bleeding  Suspected endometrial polyp  Thick endometrial stripe        PostOp Diagnosis: same        Procedure(s):  HYSTEROSCOPY WITH POLYPECTOMY - Wound Class: Clean Contaminated     Surgeon(s):  Amarilys James M.D.     Anesthesiologist/Type of Anesthesia:  Anesthesiologist: Jaun Hastings M.D./General     Surgical Staff:  Circulator: Leanna Sanchez R.N.; Licha Bryan R.N.  Scrub Person: Wade Verdin     Specimens removed if any:  ID Type Source Tests Collected by Time Destination   A : polyp Other Other PATHOLOGY SPECIMEN Amarilys James M.D. 11/18/2022  4:31 PM           Estimated Blood Loss: less than 50 ml  UO: 88 ml  IVF: 600 ml     Findings: AV 6 week size uterus with a 2 cm endometrial polyp, bilateral ostium seen. Clear endometrial cavity at the end of the procedure.     Complications: none

## 2022-11-19 NOTE — OR SURGEON
Immediate Post OP Note    PreOp Diagnosis:Pm bleeding  Suspected endometrial polyp  Thick endometrial stripe      PostOp Diagnosis: same      Procedure(s):  HYSTEROSCOPY WITH POLYPECTOMY - Wound Class: Clean Contaminated    Surgeon(s):  Amarilys James M.D.    Anesthesiologist/Type of Anesthesia:  Anesthesiologist: Jaun Hastings M.D./General    Surgical Staff:  Circulator: Leanna Sanchez R.N.; Licha Bryan R.N.  Scrub Person: Wade Verdin    Specimens removed if any:  ID Type Source Tests Collected by Time Destination   A : polyp Other Other PATHOLOGY SPECIMEN Amarilys James M.D. 11/18/2022  4:31 PM        Estimated Blood Loss: less than 50 ml  UO: 88 ml  IVF: 600 ml    Findings: AV 6 week size uterus with a 2 cm endometrial polyp, bilateral ostium seen. Clear endometrial cavity at the end of the procedure.    Complications: none        11/18/2022 4:39 PM Amarilys James M.D.

## 2022-11-19 NOTE — ANESTHESIA PROCEDURE NOTES
Airway    Date/Time: 11/18/2022 4:16 PM  Performed by: Jaun Hastings M.D.  Authorized by: Jaun Hastings M.D.     Location:  OR  Urgency:  Elective  Difficult Airway: No    Indications for Airway Management:  Anesthesia      Spontaneous Ventilation: absent    Sedation Level:  Deep  Preoxygenated: Yes    Patient Position:  Sniffing  Mask Difficulty Assessment:  2 - vent by mask + OA or adjuvant +/- NMBA  Final Airway Type:  Endotracheal airway  Final Endotracheal Airway:  ETT  Cuffed: Yes    Technique Used for Successful ETT Placement:  Direct laryngoscopy    Insertion Site:  Oral  Blade Type:  Early  Laryngoscope Blade/Videolaryngoscope Blade Size:  2  ETT Size (mm):  7.0  Measured from:  Teeth  ETT to Teeth (cm):  21  Placement Verified by: auscultation and capnometry    Cormack-Lehane Classification:  Grade IIa - partial view of glottis  Number of Attempts at Approach:  1

## 2022-11-19 NOTE — DISCHARGE INSTRUCTIONS
What to Expect Post Anesthesia    Rest and take it easy for the first 24 hours.  A responsible adult is recommended to remain with you during that time.  It is normal to feel sleepy.  We encourage you to not do anything that requires balance, judgment or coordination.    FOR 24 HOURS DO NOT:  Drive, operate machinery or run household appliances.  Drink beer or alcoholic beverages.  Make important decisions or sign legal documents.    To avoid nausea, slowly advance diet as tolerated, avoiding spicy or greasy foods for the first day.  Add more substantial food to your diet according to your provider's instructions.  Babies can be fed formula or breast milk as soon as they are hungry.  INCREASE FLUIDS AND FIBER TO AVOID CONSTIPATION.    MILD FLU-LIKE SYMPTOMS ARE NORMAL.  YOU MAY EXPERIENCE GENERALIZED MUSCLE ACHES, THROAT IRRITATION, HEADACHE AND/OR SOME NAUSEA.    If any questions arise, call your provider.  If your provider is not available, please feel free to call the Surgical Center at [phone].    MEDICATIONS: Resume taking daily medication.  Take prescribed pain medication with food.  If no medication is prescribed, you may take non-aspirin pain medication if needed.  PAIN MEDICATION CAN BE VERY CONSTIPATING.  Take a stool softener or laxative such as senokot, pericolace, or milk of magnesia if needed.        Hysteroscopy Discharge instructions    ACTIVITIES:  DO NOT USE tampons, douche, or have sexual intercourse for 2 weeks post surgery.    After discharge from the hospital, rest today, then you may resume full routine activities when you feel ready.     DRIVING:   You may drive whenever you are off pain medications and are able to perform the activities needed to drive, i.e. turning, bending, twisting, etc.    BATHING:   You may shower starting tomorrow. No tub baths, hot tubs, or swimming until your follow up appointment.     WOUND CARE:  You may experience some cramping discomfort for several days.  It is normal to have a brownish to slightly bloody discharge after surgery.    Expect some vaginal bleeding, however if you pass clots or saturate a mirela pad more often than once an hour or are not comfortable with the amount of blood you notice please notify your provider.      BOWEL FUNCTION:  Constipation is common after surgery. The combination of pain medication and decreased activity level can cause constipation in otherwise normal patients. If you feel this is occurring, take a laxative (Milk of Magnesia, Ex-Lax, Senokot, etc.) until the problem has resolved. It also helps to stay regular by including fiber in your diet (for example: bran or fruits and vegetables) and drink plenty of liquids (water, juice, etc.).

## 2022-11-19 NOTE — OP REPORT
DATE OF SERVICE:  11/18/2022     PREOPERATIVE DIAGNOSES:  1.  Postmenopausal bleeding.  2.  Suspected endometrial polyp.  3.  Thick endometrial stripe.  4.  Morbidly obese.     POSTOPERATIVE DIAGNOSES:  1.  Postmenopausal bleeding.  2.  Suspected endometrial polyp.  3.  Thick endometrial stripe.  4.  Morbidly obese.     PROCEDURES:  1.  Exam under anesthesia.  2.  Hysteroscopy with polypectomy with MyoSure.     SURGEON:  Amarilys James MD     ANESTHESIOLOGIST:  Jaun Hastings MD     TYPE OF ANESTHESIA:  General endotracheal anesthesia.     IV FLUIDS:  600 mL of LR.     URINE OUTPUT:  88 mL clear fluid.     ESTIMATED BLOOD LOSS:  Less than 50 mL     COMPLICATIONS:  None.     RECOVERY:  Stable to the PACU.     FINDINGS:  A 6-week size uterus with a 2 cm endometrial polyp in the anterior   part of the uterus.  Bilateral ostium seen. Clear endometrial cavity after the   resection of the polyp.     SPECIMENS REMOVED:  Endometrial polyp.     DESCRIPTION OF PROCEDURE:  The patient was taken to the operating room where   she received uncomplicated general endotracheal anesthesia.  She was placed in   Lavon stirrups, prepped and draped in the usual sterile fashion.  She did   receive 2 grams of Ancef.  Her bladder was drained with in and out catheter.    A speculum was placed in the vagina.  The anterior lip of the cervix was   grasped with a single tooth tenaculum.  A 10 mL of Marcaine with 0.25%   epinephrine was injected circumferentially around the cervix.  At this time,   the cervix was gently dilated with Hegar dilators up to #7 and then the   operative hysteroscope was then introduced into the endometrial cavity.  Both   ostia was seen.  The endometrium was atrophic except she did have a 2 cm   endometrial polyp arising from the anterior part of the uterus.  Using the   LITE MyoSure, this polyp was removed.  Pictures were taken before and after.    With the resection of the polyp, the endometrial cavity  was free of any   intracavitary lesions.  The MyoSure hysteroscope was then removed.  The single   tooth tenaculum was removed from the anterior lip of the cervix.  At this   time, the lap and needle counts were correct x2.  The uterus was found to be   hemostatic.  Negative fluid loss of normal saline was 180.        ______________________________  MD TABITHA JONES/VINCE/RICO    DD:  11/18/2022 16:47  DT:  11/18/2022 17:25    Job#:  [id number]

## 2022-11-19 NOTE — OR NURSING
1651 pt arrived from OR. Report received. Pt awake on5L mask. Denies pain and nausea at this time. No bleeding noted to peripad.     1706 pt tolerating PO fluids and crackers.    1734 pt family member Galina brought to bedside. Pt meets phase 2 criteria. D/c instructions given to pt and family at bedside.       1808 pt up to bathroom able to void, dressing independently      1830 pt d/c to home with family. Escorted out by staff. IV removed. All belongings sent with pt.

## 2022-11-19 NOTE — ANESTHESIA POSTPROCEDURE EVALUATION
Patient: Antonella Frausto    Procedure Summary     Date: 11/18/22 Room / Location: [facility] ROOM 25 / SURGERY SAME DAY [facility]    Anesthesia Start: 1607 Anesthesia Stop: 1652    Procedure: HYSTEROSCOPY WITH POLYPECTOMY (Vagina ) Diagnosis: (POSTMENOPAUSAL BLEEDING, FIBROID, THICKENED ENDOMETRIUM)    Surgeons: Amarilys James M.D. Responsible Provider: Jaun Hastings M.D.    Anesthesia Type: general ASA Status: 3          Final Anesthesia Type: general  Last vitals  BP   Blood Pressure: (!) 147/91    Temp   36.8 °C (98.2 °F)    Pulse   87   Resp   18    SpO2   96 %      Anesthesia Post Evaluation    Patient location during evaluation: PACU  Patient participation: complete - patient participated  Level of consciousness: awake and alert  Pain score: 2    Airway patency: patent  Anesthetic complications: no  Cardiovascular status: hemodynamically stable  Respiratory status: acceptable  Hydration status: euvolemic    PONV: none          There were no known notable events for this encounter.     Nurse Pain Score: 0 (NPRS)

## 2022-11-21 LAB — PATHOLOGY CONSULT NOTE: NORMAL

## 2023-04-23 ENCOUNTER — OFFICE VISIT (OUTPATIENT)
Dept: URGENT CARE | Facility: PHYSICIAN GROUP | Age: 58
End: 2023-04-23
Payer: COMMERCIAL

## 2023-04-23 ENCOUNTER — HOSPITAL ENCOUNTER (OUTPATIENT)
Dept: RADIOLOGY | Facility: MEDICAL CENTER | Age: 58
End: 2023-04-23
Attending: PHYSICIAN ASSISTANT
Payer: COMMERCIAL

## 2023-04-23 VITALS
HEIGHT: 64 IN | DIASTOLIC BLOOD PRESSURE: 68 MMHG | WEIGHT: 293 LBS | BODY MASS INDEX: 50.02 KG/M2 | HEART RATE: 96 BPM | SYSTOLIC BLOOD PRESSURE: 124 MMHG | RESPIRATION RATE: 18 BRPM | TEMPERATURE: 97 F | OXYGEN SATURATION: 95 %

## 2023-04-23 DIAGNOSIS — Z86.718 HISTORY OF VENOUS THROMBOSIS: ICD-10-CM

## 2023-04-23 DIAGNOSIS — M79.89 PAIN AND SWELLING OF LOWER LEG, LEFT: ICD-10-CM

## 2023-04-23 DIAGNOSIS — M79.662 PAIN AND SWELLING OF LOWER LEG, LEFT: ICD-10-CM

## 2023-04-23 DIAGNOSIS — R20.2 NUMBNESS AND TINGLING OF LEFT LEG: ICD-10-CM

## 2023-04-23 DIAGNOSIS — R20.0 NUMBNESS AND TINGLING OF LEFT LEG: ICD-10-CM

## 2023-04-23 PROCEDURE — 93971 EXTREMITY STUDY: CPT | Mod: LT

## 2023-04-23 PROCEDURE — 99213 OFFICE O/P EST LOW 20 MIN: CPT | Performed by: PHYSICIAN ASSISTANT

## 2023-04-23 ASSESSMENT — FIBROSIS 4 INDEX: FIB4 SCORE: 0.64

## 2023-04-23 ASSESSMENT — ENCOUNTER SYMPTOMS
LEG PAIN: 1
TINGLING: 1
MYALGIAS: 1
BACK PAIN: 1

## 2023-04-23 NOTE — PROGRESS NOTES
"Subjective     Antonella Frausto is a 57 y.o. female who presents with Leg Pain (X 4 days Rt lower leg redness, tingling feeling)            Patient presents clinic today with complaint of left lower leg pain x4 days.  Patient states she has noticed some increasing redness, tenderness and tingling in the medial aspect of her leg.  Patient has history of superficial venous thrombosis in 2018 in her right leg.  Patient states symptoms worsen with standing and exertion, elevation does not really change her symptoms.  Patient denies recent travel, injury or trauma to this leg.  No other complaints.    Leg Pain  This is a new problem. The current episode started in the past 7 days. The problem occurs constantly. The problem has been gradually worsening. Associated symptoms include myalgias. The symptoms are aggravated by walking and exertion. She has tried position changes and rest for the symptoms. The treatment provided no relief.     Review of Systems   Cardiovascular:  Positive for leg swelling.   Musculoskeletal:  Positive for back pain and myalgias.   Neurological:  Positive for tingling.   All other systems reviewed and are negative.           Objective     /68 (BP Location: Left arm, Patient Position: Sitting, BP Cuff Size: Adult)   Pulse 96   Temp 36.1 °C (97 °F) (Temporal)   Resp 18   Ht 1.626 m (5' 4\")   Wt (!) 135 kg (298 lb)   SpO2 95%   BMI 51.15 kg/m²      Physical Exam  Vitals and nursing note reviewed.   Constitutional:       General: She is not in acute distress.     Appearance: Normal appearance. She is well-developed. She is not ill-appearing or toxic-appearing.   HENT:      Head: Normocephalic and atraumatic.      Right Ear: Tympanic membrane normal.      Left Ear: Tympanic membrane normal.      Nose: Nose normal.      Mouth/Throat:      Lips: Pink.      Mouth: Mucous membranes are moist.      Pharynx: Oropharynx is clear. Uvula midline.   Eyes:      Extraocular Movements: Extraocular " movements intact.      Conjunctiva/sclera: Conjunctivae normal.      Pupils: Pupils are equal, round, and reactive to light.   Cardiovascular:      Rate and Rhythm: Normal rate and regular rhythm.      Pulses: Normal pulses.      Heart sounds: Normal heart sounds.   Pulmonary:      Effort: Pulmonary effort is normal.      Breath sounds: Normal breath sounds.   Abdominal:      General: Bowel sounds are normal.      Palpations: Abdomen is soft.   Musculoskeletal:         General: Normal range of motion.      Cervical back: Normal range of motion and neck supple.        Legs:       Comments: Area of redness and tingling sensation to touch.  No abrasion, blister, lesion or puncture wound.  Slight swelling but not significant.  Distal neurovascular intact.   Skin:     General: Skin is warm and dry.      Capillary Refill: Capillary refill takes less than 2 seconds.   Neurological:      General: No focal deficit present.      Mental Status: She is alert and oriented to person, place, and time.      Cranial Nerves: No cranial nerve deficit.      Motor: Motor function is intact.      Coordination: Coordination is intact.      Gait: Gait normal.   Psychiatric:         Mood and Affect: Mood normal.                           Assessment & Plan        1. Pain and swelling of lower leg, left    - US-EXTREMITY VENOUS LOWER UNILAT LEFT; Future    2. History of venous thrombosis    - US-EXTREMITY VENOUS LOWER UNILAT LEFT; Future          Reviewed ultrasound results, negative for any acute finding, negative for DVT or phlebitis.  I discussed this with patient, she is aware of results.    I discussed with her that her tingling and pain in her leg may be due to radiation of pain due to low back pain that she has been experiencing for the last few days.    I encouraged patient to follow-up with her primary care provider for any as desired for reevaluation if symptoms persist.  Patient verbalized understanding and agreement with this  plan.    PT should follow up with PCP in 1-2 days for re-evaluation if symptoms have not improved.      Discussed red flags and reasons to return to UC or ED.      Pt and/or family verbalized understanding of diagnosis and follow up instructions and was offered informational handout on diagnosis.  PT discharged.

## 2023-07-10 ENCOUNTER — OFFICE VISIT (OUTPATIENT)
Dept: URGENT CARE | Facility: PHYSICIAN GROUP | Age: 58
End: 2023-07-10
Payer: COMMERCIAL

## 2023-07-10 VITALS
RESPIRATION RATE: 14 BRPM | SYSTOLIC BLOOD PRESSURE: 138 MMHG | HEART RATE: 82 BPM | OXYGEN SATURATION: 97 % | WEIGHT: 293 LBS | TEMPERATURE: 98.6 F | HEIGHT: 64 IN | BODY MASS INDEX: 50.02 KG/M2 | DIASTOLIC BLOOD PRESSURE: 90 MMHG

## 2023-07-10 DIAGNOSIS — R05.1 ACUTE COUGH: ICD-10-CM

## 2023-07-10 DIAGNOSIS — J32.9 BACTERIAL SINUSITIS: ICD-10-CM

## 2023-07-10 DIAGNOSIS — B96.89 BACTERIAL SINUSITIS: ICD-10-CM

## 2023-07-10 PROCEDURE — 3075F SYST BP GE 130 - 139MM HG: CPT | Performed by: FAMILY MEDICINE

## 2023-07-10 PROCEDURE — 99213 OFFICE O/P EST LOW 20 MIN: CPT | Performed by: FAMILY MEDICINE

## 2023-07-10 PROCEDURE — 3080F DIAST BP >= 90 MM HG: CPT | Performed by: FAMILY MEDICINE

## 2023-07-10 RX ORDER — BENZONATATE 200 MG/1
200 CAPSULE ORAL 3 TIMES DAILY PRN
Qty: 20 CAPSULE | Refills: 0 | Status: SHIPPED | OUTPATIENT
Start: 2023-07-10 | End: 2023-07-17 | Stop reason: SDUPTHER

## 2023-07-10 RX ORDER — AMOXICILLIN AND CLAVULANATE POTASSIUM 875; 125 MG/1; MG/1
1 TABLET, FILM COATED ORAL 2 TIMES DAILY
Qty: 10 TABLET | Refills: 0 | Status: SHIPPED | OUTPATIENT
Start: 2023-07-10 | End: 2023-07-15

## 2023-07-10 ASSESSMENT — FIBROSIS 4 INDEX: FIB4 SCORE: 0.64

## 2023-07-10 NOTE — PROGRESS NOTES
"  Subjective:      57 y.o. female presents to urgent care for cold symptoms that started about 3 weeks ago. She is experiencing increased sinus pressure, cough, and sore throat.  No headaches, body aches, fever, or diarrhea. No tobacco product use.  No history of asthma or COPD.  She is vaccinated against COVID.  No known sick contacts.    She denies any other questions or concerns at this time.    Current problem list, medication, and past medical/surgical history were reviewed in Epic.    ROS  See HPI     Objective:      BP (!) 138/90   Pulse 82   Temp 37 °C (98.6 °F)   Resp 14   Ht 1.626 m (5' 4\")   Wt (!) 134 kg (295 lb)   SpO2 97%   BMI 50.64 kg/m²     Physical Exam  Constitutional:       General: She is not in acute distress.     Appearance: She is not diaphoretic.   HENT:      Right Ear: Tympanic membrane, ear canal and external ear normal.      Left Ear: Tympanic membrane, ear canal and external ear normal.      Nose:      Right Sinus: Maxillary sinus tenderness present. No frontal sinus tenderness.      Left Sinus: Maxillary sinus tenderness present. No frontal sinus tenderness.      Mouth/Throat:      Tongue: Tongue does not deviate from midline.      Palate: No lesions.      Pharynx: Uvula midline. No posterior oropharyngeal erythema.      Tonsils: No tonsillar exudate.   Cardiovascular:      Rate and Rhythm: Normal rate and regular rhythm.      Heart sounds: Normal heart sounds.   Pulmonary:      Effort: Pulmonary effort is normal. No respiratory distress.      Breath sounds: Normal breath sounds.   Neurological:      Mental Status: She is alert.   Psychiatric:         Mood and Affect: Affect normal.         Judgment: Judgment normal.       Assessment/Plan:     1. Bacterial sinusitis  2. Acute cough  Criteria for bacterial sinusitis has been met.  Prescription for Augmentin has been sent.  Tylenol and ibuprofen as needed for symptomatic relief.  - amoxicillin-clavulanate (AUGMENTIN) 875-125 MG " Tab; Take 1 Tablet by mouth 2 times a day for 5 days.  Dispense: 10 Tablet; Refill: 0  - benzonatate (TESSALON) 200 MG capsule; Take 1 Capsule by mouth 3 times a day as needed for Cough.  Dispense: 20 Capsule; Refill: 0      Instructed to return to Urgent Care or nearest Emergency Department if symptoms fail to improve, for any change in condition, further concerns, or new concerning symptoms. Patient states understanding of the plan of care and discharge instructions.    Lillie Constantino M.D.

## 2023-07-17 ENCOUNTER — OFFICE VISIT (OUTPATIENT)
Dept: MEDICAL GROUP | Facility: PHYSICIAN GROUP | Age: 58
End: 2023-07-17
Payer: COMMERCIAL

## 2023-07-17 ENCOUNTER — HOSPITAL ENCOUNTER (OUTPATIENT)
Dept: LAB | Facility: MEDICAL CENTER | Age: 58
End: 2023-07-17
Attending: NURSE PRACTITIONER
Payer: COMMERCIAL

## 2023-07-17 VITALS
HEIGHT: 64 IN | OXYGEN SATURATION: 97 % | WEIGHT: 284 LBS | BODY MASS INDEX: 48.49 KG/M2 | HEART RATE: 76 BPM | SYSTOLIC BLOOD PRESSURE: 124 MMHG | DIASTOLIC BLOOD PRESSURE: 62 MMHG | TEMPERATURE: 97 F

## 2023-07-17 DIAGNOSIS — R73.03 PREDIABETES: ICD-10-CM

## 2023-07-17 DIAGNOSIS — Z12.12 SCREENING FOR COLORECTAL CANCER: ICD-10-CM

## 2023-07-17 DIAGNOSIS — E89.0 POSTOPERATIVE HYPOTHYROIDISM: ICD-10-CM

## 2023-07-17 DIAGNOSIS — E78.5 DYSLIPIDEMIA: ICD-10-CM

## 2023-07-17 DIAGNOSIS — Z00.00 ROUTINE HEALTH MAINTENANCE: ICD-10-CM

## 2023-07-17 DIAGNOSIS — B96.89 BACTERIAL SINUSITIS: ICD-10-CM

## 2023-07-17 DIAGNOSIS — R05.1 ACUTE COUGH: ICD-10-CM

## 2023-07-17 DIAGNOSIS — Z00.01 ENCOUNTER FOR WELL ADULT EXAM WITH ABNORMAL FINDINGS: ICD-10-CM

## 2023-07-17 DIAGNOSIS — E66.01 MORBID OBESITY WITH BMI OF 50.0-59.9, ADULT (HCC): ICD-10-CM

## 2023-07-17 DIAGNOSIS — Z23 NEED FOR VACCINATION: ICD-10-CM

## 2023-07-17 DIAGNOSIS — E55.9 VITAMIN D DEFICIENCY: ICD-10-CM

## 2023-07-17 DIAGNOSIS — Z12.11 SCREENING FOR COLORECTAL CANCER: ICD-10-CM

## 2023-07-17 DIAGNOSIS — J32.9 BACTERIAL SINUSITIS: ICD-10-CM

## 2023-07-17 DIAGNOSIS — Z12.31 ENCOUNTER FOR SCREENING MAMMOGRAM FOR BREAST CANCER: ICD-10-CM

## 2023-07-17 LAB
25(OH)D3 SERPL-MCNC: 56 NG/ML (ref 30–100)
ALBUMIN SERPL BCP-MCNC: 3.9 G/DL (ref 3.2–4.9)
ALBUMIN/GLOB SERPL: 1.3 G/DL
ALP SERPL-CCNC: 89 U/L (ref 30–99)
ALT SERPL-CCNC: 27 U/L (ref 2–50)
ANION GAP SERPL CALC-SCNC: 9 MMOL/L (ref 7–16)
AST SERPL-CCNC: 18 U/L (ref 12–45)
BASOPHILS # BLD AUTO: 0.5 % (ref 0–1.8)
BASOPHILS # BLD: 0.05 K/UL (ref 0–0.12)
BILIRUB SERPL-MCNC: 0.3 MG/DL (ref 0.1–1.5)
BUN SERPL-MCNC: 17 MG/DL (ref 8–22)
CALCIUM ALBUM COR SERPL-MCNC: 9.6 MG/DL (ref 8.5–10.5)
CALCIUM SERPL-MCNC: 9.5 MG/DL (ref 8.5–10.5)
CHLORIDE SERPL-SCNC: 105 MMOL/L (ref 96–112)
CHOLEST SERPL-MCNC: 137 MG/DL (ref 100–199)
CO2 SERPL-SCNC: 25 MMOL/L (ref 20–33)
CREAT SERPL-MCNC: 0.72 MG/DL (ref 0.5–1.4)
CREAT UR-MCNC: 191.38 MG/DL
EOSINOPHIL # BLD AUTO: 0.38 K/UL (ref 0–0.51)
EOSINOPHIL NFR BLD: 4 % (ref 0–6.9)
ERYTHROCYTE [DISTWIDTH] IN BLOOD BY AUTOMATED COUNT: 45 FL (ref 35.9–50)
EST. AVERAGE GLUCOSE BLD GHB EST-MCNC: 128 MG/DL
FASTING STATUS PATIENT QL REPORTED: NORMAL
GFR SERPLBLD CREATININE-BSD FMLA CKD-EPI: 96 ML/MIN/1.73 M 2
GLOBULIN SER CALC-MCNC: 2.9 G/DL (ref 1.9–3.5)
GLUCOSE SERPL-MCNC: 93 MG/DL (ref 65–99)
HBA1C MFR BLD: 6.1 % (ref 4–5.6)
HCT VFR BLD AUTO: 41.8 % (ref 37–47)
HDLC SERPL-MCNC: 34 MG/DL
HGB BLD-MCNC: 13.1 G/DL (ref 12–16)
IMM GRANULOCYTES # BLD AUTO: 0.03 K/UL (ref 0–0.11)
IMM GRANULOCYTES NFR BLD AUTO: 0.3 % (ref 0–0.9)
LDLC SERPL CALC-MCNC: 81 MG/DL
LYMPHOCYTES # BLD AUTO: 2.56 K/UL (ref 1–4.8)
LYMPHOCYTES NFR BLD: 27.1 % (ref 22–41)
MCH RBC QN AUTO: 27.3 PG (ref 27–33)
MCHC RBC AUTO-ENTMCNC: 31.3 G/DL (ref 32.2–35.5)
MCV RBC AUTO: 87.1 FL (ref 81.4–97.8)
MICROALBUMIN UR-MCNC: <1.2 MG/DL
MICROALBUMIN/CREAT UR: NORMAL MG/G (ref 0–30)
MONOCYTES # BLD AUTO: 0.66 K/UL (ref 0–0.85)
MONOCYTES NFR BLD AUTO: 7 % (ref 0–13.4)
NEUTROPHILS # BLD AUTO: 5.78 K/UL (ref 1.82–7.42)
NEUTROPHILS NFR BLD: 61.1 % (ref 44–72)
NRBC # BLD AUTO: 0 K/UL
NRBC BLD-RTO: 0 /100 WBC (ref 0–0.2)
PLATELET # BLD AUTO: 336 K/UL (ref 164–446)
PMV BLD AUTO: 10.2 FL (ref 9–12.9)
POTASSIUM SERPL-SCNC: 4.3 MMOL/L (ref 3.6–5.5)
PROT SERPL-MCNC: 6.8 G/DL (ref 6–8.2)
RBC # BLD AUTO: 4.8 M/UL (ref 4.2–5.4)
SODIUM SERPL-SCNC: 139 MMOL/L (ref 135–145)
T4 FREE SERPL-MCNC: 1.6 NG/DL (ref 0.93–1.7)
TRIGL SERPL-MCNC: 112 MG/DL (ref 0–149)
TSH SERPL DL<=0.005 MIU/L-ACNC: 0.35 UIU/ML (ref 0.38–5.33)
WBC # BLD AUTO: 9.5 K/UL (ref 4.8–10.8)

## 2023-07-17 PROCEDURE — 84439 ASSAY OF FREE THYROXINE: CPT

## 2023-07-17 PROCEDURE — 84443 ASSAY THYROID STIM HORMONE: CPT

## 2023-07-17 PROCEDURE — 3074F SYST BP LT 130 MM HG: CPT | Performed by: NURSE PRACTITIONER

## 2023-07-17 PROCEDURE — 82306 VITAMIN D 25 HYDROXY: CPT

## 2023-07-17 PROCEDURE — 36415 COLL VENOUS BLD VENIPUNCTURE: CPT

## 2023-07-17 PROCEDURE — 85025 COMPLETE CBC W/AUTO DIFF WBC: CPT

## 2023-07-17 PROCEDURE — 99396 PREV VISIT EST AGE 40-64: CPT | Mod: 25 | Performed by: NURSE PRACTITIONER

## 2023-07-17 PROCEDURE — 90746 HEPB VACCINE 3 DOSE ADULT IM: CPT | Performed by: NURSE PRACTITIONER

## 2023-07-17 PROCEDURE — 83036 HEMOGLOBIN GLYCOSYLATED A1C: CPT

## 2023-07-17 PROCEDURE — 80061 LIPID PANEL: CPT

## 2023-07-17 PROCEDURE — 80053 COMPREHEN METABOLIC PANEL: CPT

## 2023-07-17 PROCEDURE — 3078F DIAST BP <80 MM HG: CPT | Performed by: NURSE PRACTITIONER

## 2023-07-17 PROCEDURE — 90471 IMMUNIZATION ADMIN: CPT | Performed by: NURSE PRACTITIONER

## 2023-07-17 PROCEDURE — 82570 ASSAY OF URINE CREATININE: CPT

## 2023-07-17 PROCEDURE — 82043 UR ALBUMIN QUANTITATIVE: CPT

## 2023-07-17 RX ORDER — BENZONATATE 200 MG/1
200 CAPSULE ORAL 3 TIMES DAILY PRN
Qty: 60 CAPSULE | Refills: 0 | Status: SHIPPED | OUTPATIENT
Start: 2023-07-17

## 2023-07-17 ASSESSMENT — FIBROSIS 4 INDEX: FIB4 SCORE: 0.64

## 2023-07-17 ASSESSMENT — PATIENT HEALTH QUESTIONNAIRE - PHQ9: CLINICAL INTERPRETATION OF PHQ2 SCORE: 0

## 2023-07-17 NOTE — PROGRESS NOTES
Subjective:   CC:   Chief Complaint   Patient presents with    Annual Exam    Lab Results    Sinusitis     X 1 month     HPI:   Antonella Frausto is a 57 y.o. female who presents for annual exam:    Anticipatory Guidance:  Cholesterol screenin2022   LDL controlled: 99  Diabetes screenin22   A1c controlled: 5.8%  Diet: Recommend more lean meats, fruits, vegetables, whole grains. Tried Golo, but felt hungry all of the time. Feels that her diet is pretty healthy.  Exercise: Encourage regular exercise. Got an exercise bike.  Substance abuse: No  Safe in relationship: Yes  Seatbelts, bike/motorcycle helmet: Yes  Sun protection: Yes  Dentist: Up to date   Eye doctor: Up to date    Cancer Screening:  Colorectal cancer screening: Due, Cologuard ordered today.  Cervical cancer screenin2018  Breast cancer screenin22, due 2023    Infectious Disease Screening/Immunizations:  STI screening: Declines  Chlamydia/Gonorrhea screening: Declines  Hep C screenin2019  HIV screen: Declines  Practices safe sex: Yes    Immunizations:   Influenza: NA   Tetanus: 2022   Pneumonia: NA   Received HPV series: Aged out    Preventative Care Screening:   Osteoporosis Screening: NA - Due at age 63  Tobacco Screening: Never smoker  AAA Screening: NA    Patient's last menstrual period was 2012 (within years).  She has not utilized hormone replacement therapy.  Denies any menopausal symptoms.  No significant bloating/fluid retention, pelvic pain, or dyspareunia. No abnormal vaginal discharge.   No breast tenderness, mass, nipple discharge or changes in size or contour.    OB History    Para Term  AB Living   4 3 3 0 1 3   SAB IAB Ectopic Molar Multiple Live Births   1 0 0 0 0 3     She  reports being sexually active and has had partner(s) who are male.  She  has a past medical history of Arthritis, Bronchitis (12-15-14), Hypothyroidism, Increased BMI, Pain (-), and Venous  thrombosis (8/16/2018).  She  has a past surgical history that includes cholecystectomy (1995); gyn surgery; thyroidectomy total (10/20/2010); node biopsy (10/20/2010); knee arthroscopy (12/29/2014); meniscectomy, knee, medial (12/29/2014); chondroplasty (12/29/2014); and hysteroscopy with myosure (11/18/2022).    Family History   Problem Relation Age of Onset    Cancer Mother         Thyroid ca, lymphoma    Thyroid Mother     Heart Disease Father     Heart Failure Father     No Known Problems Sister     Heart Failure Brother     Cancer Maternal Grandmother         Cervical Cancer    No Known Problems Maternal Grandfather     Dementia Paternal Grandmother     Parkinson's Disease Paternal Grandmother     Cancer Paternal Grandfather         Throat and liver    Arthritis Brother         psoriatic    DVT Brother     Psoriasis Brother     Heart Disease Brother      Social History     Tobacco Use    Smoking status: Never    Smokeless tobacco: Never   Vaping Use    Vaping Use: Never used   Substance Use Topics    Alcohol use: No    Drug use: No     Patient Active Problem List    Diagnosis Date Noted    Postmenopausal bleeding 07/25/2022    Intrinsic eczema 07/22/2021    Prediabetes 02/13/2020    Shortness of breath 11/12/2019    Chronic allergic rhinitis 04/23/2018    Morbid obesity with BMI of 50.0-59.9, adult (HCC) 02/16/2018    Vitamin D deficiency 08/16/2017    Osteoarthritis of foot 07/10/2013    Postoperative hypothyroidism 09/14/2012    Dyslipidemia 09/14/2012     Current Outpatient Medications   Medication Sig Dispense Refill    benzonatate (TESSALON) 200 MG capsule Take 1 Capsule by mouth 3 times a day as needed for Cough. 60 Capsule 0    Ascorbic Acid (VITAMIN C) 1000 MG Tab Take 0.5 Tablets by mouth every day.      Potassium 99 MG Tab Take  by mouth every day.      vitamin D2, Ergocalciferol, (DRISDOL) 1.25 MG (26306 UT) Cap capsule Take 2,000 Units by mouth every day.      Zinc 100 MG Tab Take  by mouth every  "day.      Magnesium 200 MG Tab Take  by mouth every day.      levothyroxine (SYNTHROID) 175 MCG Tab Take 1 Tablet by mouth every morning on an empty stomach. 90 Tablet 3    triamcinolone acetonide (KENALOG) 0.5 % Cream Apply 1 g topically 2 times a day as needed (eczema). 15 g 6    aspirin EC (ECOTRIN) 81 MG Tablet Delayed Response Take 1 Tablet by mouth every day.      Cholecalciferol (VITAMIN D3) 2000 UNIT CAPS Take 2 Capsules by mouth every day.      cetirizine (ZYRTEC) 10 MG TABS Take 1 Tablet by mouth every day.       No current facility-administered medications for this visit.     Allergies   Allergen Reactions    Asa [Aspirin] Vomiting and Nausea    Codeine Vomiting and Nausea    Latex      \"irritates my skin\"     Review of Systems   Constitutional: Negative for fever, chills and malaise/fatigue.   HENT: Negative for congestion.    Eyes: Negative for pain.   Respiratory: + cough. Negative for shortness of breath.    Cardiovascular: Negative for chest pain and leg swelling.   Gastrointestinal: Negative for nausea, vomiting, abdominal pain and diarrhea.   Genitourinary: Negative for dysuria and hematuria.   Skin: Negative for rash.   Neurological: Negative for dizziness, focal weakness and headaches.   Endo/Heme/Allergies: Does not bruise/bleed easily.   Psychiatric/Behavioral: Negative for depression.  The patient is not nervous/anxious.      Objective:   /62 (BP Location: Left arm, Patient Position: Sitting, BP Cuff Size: Large adult)   Pulse 76   Temp 36.1 °C (97 °F) (Temporal)   Ht 1.626 m (5' 4\")   Wt (!) 129 kg (284 lb)   LMP 07/17/2012 (Within Years)   SpO2 97%   BMI 48.75 kg/m²     Wt Readings from Last 4 Encounters:   07/17/23 (!) 129 kg (284 lb)   07/10/23 (!) 134 kg (295 lb)   04/23/23 (!) 135 kg (298 lb)   11/18/22 (!) 128 kg (282 lb 3 oz)     Physical Exam:  Constitutional: Well-developed and well-nourished. Not diaphoretic. No distress.   Skin: Skin is warm and dry. No rash " noted.  Head: Atraumatic without lesions.  Eyes: Conjunctivae and extraocular motions are normal. Pupils are equal, round, and reactive to light. No scleral icterus.   Ears:  External ears unremarkable. Tympanic membranes clear and intact.  Nose: Nares patent. Septum midline. Turbinates without erythema nor edema. No discharge.   Mouth/Throat: Tongue normal. Oropharynx is clear and moist. Posterior pharynx without erythema or exudates.  Neck: Supple, trachea midline. Normal range of motion. No thyromegaly present. No lymphadenopathy--cervical or supraclavicular.  Cardiovascular: Regular rate and rhythm, S1 and S2 without murmur, rubs, or gallops.    Respiratory: Effort normal. Clear to auscultation throughout. No adventitious sounds.   Breast:  Breast exam deferred. Discussed monthly self exams and what to look for, including peau d'orange or nipple retraction, discharge, breasts moving freely and equally without restriction, axillary/supraclavicular adenopathy, or palpable masses/nodules.  Abdomen: Soft, non tender, and without distention. Active bowel sounds in all four quadrants. No rebound, guarding.  Extremities: No cyanosis, clubbing, erythema, nor edema. Radial pulses intact and symmetric.   Musculoskeletal: All major joints AROM full in all directions without pain.  Neurological: Alert and oriented x 3. Grossly non-focal. Strength and sensation grossly intact.   Psychiatric:  Behavior, mood, and affect are appropriate.    Assessment and Plan:   1. Encounter for well adult exam with abnormal findings    2. Acute cough  3. Bacterial sinusitis  Ongoing problem for the patient, refill for benzonatate 200 mg three times daily sent to pharmacy.  - benzonatate (TESSALON) 200 MG capsule; Take 1 Capsule by mouth 3 times a day as needed for Cough.  Dispense: 60 Capsule; Refill: 0    4. Encounter for screening mammogram for breast cancer  Due for screening in August 2023.  - MA-SCREENING MAMMO BILAT W/TOMOSYNTHESIS  W/CAD; Future    5. Screening for colorectal cancer  Due for screening.   - COLOGUARD (FIT DNA)    6. Need for vaccination  Given today, next dose due after 8/14/2023.  - Hepatitis B Vaccine Adult 20+     Health maintenance: Up to date   Labs per orders  Immunizations: per orders  Patient counseled about skin care, diet, supplements, and exercise.  Discussed  breast self exam, mammography screening, menopause, osteoporosis, adequate intake of calcium and vitamin D, diet and exercise, colorectal cancer screening     Follow-up: Return in about 1 year (around 7/17/2024) for Preventative Annual, Follow up Labs, As needed --- 1 month MA.     I have placed the below orders and discussed them with an approved delegating provider. The MA is performing the below orders under the direction of Dr. Olivera.      Please note that this dictation was created using voice recognition software. I have worked with consultants from the vendor as well as technical experts from [facility] [employer] to optimize the interface. I have made every reasonable attempt to correct obvious errors, but I expect that there are errors of grammar and possibly content that I did not discover before finalizing the note.

## 2023-07-18 DIAGNOSIS — E89.0 POSTOPERATIVE HYPOTHYROIDISM: ICD-10-CM

## 2023-07-18 DIAGNOSIS — E78.5 DYSLIPIDEMIA: ICD-10-CM

## 2023-07-18 DIAGNOSIS — Z00.00 ROUTINE HEALTH MAINTENANCE: ICD-10-CM

## 2023-07-18 DIAGNOSIS — E55.9 VITAMIN D DEFICIENCY: ICD-10-CM

## 2023-07-18 DIAGNOSIS — E66.01 MORBID OBESITY WITH BMI OF 50.0-59.9, ADULT (HCC): ICD-10-CM

## 2023-07-18 DIAGNOSIS — R73.03 PREDIABETES: ICD-10-CM

## 2023-07-18 NOTE — PROGRESS NOTES
1. Dyslipidemia  - Comp Metabolic Panel; Future  - Lipid Profile; Future  - T3 FREE; Future  - FREE THYROXINE; Future  - TSH; Future    2. Morbid obesity with BMI of 50.0-59.9, adult (HCC)  - HEMOGLOBIN A1C; Future  - CBC WITH DIFFERENTIAL; Future  - Comp Metabolic Panel; Future  - Lipid Profile; Future  - T3 FREE; Future  - FREE THYROXINE; Future  - TSH; Future    3. Postoperative hypothyroidism  - T3 FREE; Future  - FREE THYROXINE; Future  - TSH; Future    4. Prediabetes  - HEMOGLOBIN A1C; Future  - Comp Metabolic Panel; Future  - Lipid Profile; Future  - MICROALBUMIN CREAT RATIO URINE; Future    5. Vitamin D deficiency  - VITAMIN D,25 HYDROXY (DEFICIENCY); Future    6. Routine health maintenance  - HEMOGLOBIN A1C; Future  - CBC WITH DIFFERENTIAL; Future  - Comp Metabolic Panel; Future  - Lipid Profile; Future  - MICROALBUMIN CREAT RATIO URINE; Future  - VITAMIN D,25 HYDROXY (DEFICIENCY); Future  - T3 FREE; Future  - FREE THYROXINE; Future  - TSH; Future     Due for annual labs in July 2024 prior to annual follow up.

## 2023-07-20 DIAGNOSIS — E89.0 POSTOPERATIVE HYPOTHYROIDISM: ICD-10-CM

## 2023-07-20 RX ORDER — LEVOTHYROXINE SODIUM 0.15 MG/1
150 TABLET ORAL
Qty: 90 TABLET | Refills: 0 | Status: SHIPPED | OUTPATIENT
Start: 2023-07-20 | End: 2023-10-05 | Stop reason: SDUPTHER

## 2023-07-20 NOTE — PROGRESS NOTES
1. Postoperative hypothyroidism  TSH low on recent labs.  Discontinue levothyroxine 175 mcg daily and start levothyroxine 150 mcg daily.  Plan to repeat labs in 2 months, follow-up in clinic to review results.  - levothyroxine (SYNTHROID) 150 MCG Tab; Take 1 Tablet by mouth every morning on an empty stomach.  Dispense: 90 Tablet; Refill: 0  - T3 FREE; Future  - FREE THYROXINE; Future  - TSH; Future

## 2023-08-15 ENCOUNTER — APPOINTMENT (OUTPATIENT)
Dept: MEDICAL GROUP | Facility: PHYSICIAN GROUP | Age: 58
End: 2023-08-15
Payer: COMMERCIAL

## 2023-10-04 ENCOUNTER — HOSPITAL ENCOUNTER (OUTPATIENT)
Dept: LAB | Facility: MEDICAL CENTER | Age: 58
End: 2023-10-04
Attending: NURSE PRACTITIONER
Payer: COMMERCIAL

## 2023-10-04 DIAGNOSIS — E89.0 POSTOPERATIVE HYPOTHYROIDISM: ICD-10-CM

## 2023-10-04 LAB
T3FREE SERPL-MCNC: 2.52 PG/ML (ref 2–4.4)
T4 FREE SERPL-MCNC: 1.51 NG/DL (ref 0.93–1.7)
TSH SERPL DL<=0.005 MIU/L-ACNC: 1.67 UIU/ML (ref 0.38–5.33)

## 2023-10-04 PROCEDURE — 36415 COLL VENOUS BLD VENIPUNCTURE: CPT

## 2023-10-04 PROCEDURE — 84481 FREE ASSAY (FT-3): CPT

## 2023-10-04 PROCEDURE — 84439 ASSAY OF FREE THYROXINE: CPT

## 2023-10-04 PROCEDURE — 84443 ASSAY THYROID STIM HORMONE: CPT

## 2023-10-05 DIAGNOSIS — E89.0 POSTOPERATIVE HYPOTHYROIDISM: ICD-10-CM

## 2023-10-05 RX ORDER — LEVOTHYROXINE SODIUM 0.15 MG/1
150 TABLET ORAL
Qty: 90 TABLET | Refills: 3 | Status: SHIPPED | OUTPATIENT
Start: 2023-10-05

## 2023-10-05 NOTE — PROGRESS NOTES
Requested Prescriptions     Signed Prescriptions Disp Refills    levothyroxine (SYNTHROID) 150 MCG Tab 90 Tablet 3     Sig: Take 1 Tablet by mouth every morning on an empty stomach.       TOVA Nieto.

## 2024-07-10 ENCOUNTER — HOSPITAL ENCOUNTER (OUTPATIENT)
Dept: LAB | Facility: MEDICAL CENTER | Age: 59
End: 2024-07-10
Attending: NURSE PRACTITIONER
Payer: COMMERCIAL

## 2024-07-10 DIAGNOSIS — E89.0 POSTOPERATIVE HYPOTHYROIDISM: ICD-10-CM

## 2024-07-10 DIAGNOSIS — E78.5 DYSLIPIDEMIA: ICD-10-CM

## 2024-07-10 DIAGNOSIS — R73.03 PREDIABETES: ICD-10-CM

## 2024-07-10 DIAGNOSIS — E55.9 VITAMIN D DEFICIENCY: ICD-10-CM

## 2024-07-10 DIAGNOSIS — Z00.00 ROUTINE HEALTH MAINTENANCE: ICD-10-CM

## 2024-07-10 DIAGNOSIS — E66.01 MORBID OBESITY WITH BMI OF 50.0-59.9, ADULT (HCC): ICD-10-CM

## 2024-07-10 LAB
25(OH)D3 SERPL-MCNC: 72 NG/ML (ref 30–100)
ALBUMIN SERPL BCP-MCNC: 3.9 G/DL (ref 3.2–4.9)
ALBUMIN/GLOB SERPL: 1.4 G/DL
ALP SERPL-CCNC: 90 U/L (ref 30–99)
ALT SERPL-CCNC: 20 U/L (ref 2–50)
ANION GAP SERPL CALC-SCNC: 12 MMOL/L (ref 7–16)
AST SERPL-CCNC: 19 U/L (ref 12–45)
BASOPHILS # BLD AUTO: 0.7 % (ref 0–1.8)
BASOPHILS # BLD: 0.08 K/UL (ref 0–0.12)
BILIRUB SERPL-MCNC: 0.4 MG/DL (ref 0.1–1.5)
BUN SERPL-MCNC: 28 MG/DL (ref 8–22)
CALCIUM ALBUM COR SERPL-MCNC: 10.2 MG/DL (ref 8.5–10.5)
CALCIUM SERPL-MCNC: 10.1 MG/DL (ref 8.5–10.5)
CHLORIDE SERPL-SCNC: 104 MMOL/L (ref 96–112)
CHOLEST SERPL-MCNC: 134 MG/DL (ref 100–199)
CO2 SERPL-SCNC: 24 MMOL/L (ref 20–33)
CREAT SERPL-MCNC: 0.81 MG/DL (ref 0.5–1.4)
CREAT UR-MCNC: 240.25 MG/DL
EOSINOPHIL # BLD AUTO: 0.26 K/UL (ref 0–0.51)
EOSINOPHIL NFR BLD: 2.4 % (ref 0–6.9)
ERYTHROCYTE [DISTWIDTH] IN BLOOD BY AUTOMATED COUNT: 47.8 FL (ref 35.9–50)
EST. AVERAGE GLUCOSE BLD GHB EST-MCNC: 111 MG/DL
GFR SERPLBLD CREATININE-BSD FMLA CKD-EPI: 83 ML/MIN/1.73 M 2
GLOBULIN SER CALC-MCNC: 2.8 G/DL (ref 1.9–3.5)
GLUCOSE SERPL-MCNC: 93 MG/DL (ref 65–99)
HBA1C MFR BLD: 5.5 % (ref 4–5.6)
HCT VFR BLD AUTO: 42 % (ref 37–47)
HDLC SERPL-MCNC: 35 MG/DL
HGB BLD-MCNC: 13.6 G/DL (ref 12–16)
IMM GRANULOCYTES # BLD AUTO: 0.04 K/UL (ref 0–0.11)
IMM GRANULOCYTES NFR BLD AUTO: 0.4 % (ref 0–0.9)
LDLC SERPL CALC-MCNC: 84 MG/DL
LYMPHOCYTES # BLD AUTO: 2.51 K/UL (ref 1–4.8)
LYMPHOCYTES NFR BLD: 23.4 % (ref 22–41)
MCH RBC QN AUTO: 28.9 PG (ref 27–33)
MCHC RBC AUTO-ENTMCNC: 32.4 G/DL (ref 32.2–35.5)
MCV RBC AUTO: 89.2 FL (ref 81.4–97.8)
MICROALBUMIN UR-MCNC: 5.6 MG/DL
MICROALBUMIN/CREAT UR: 23 MG/G (ref 0–30)
MONOCYTES # BLD AUTO: 0.74 K/UL (ref 0–0.85)
MONOCYTES NFR BLD AUTO: 6.9 % (ref 0–13.4)
NEUTROPHILS # BLD AUTO: 7.1 K/UL (ref 1.82–7.42)
NEUTROPHILS NFR BLD: 66.2 % (ref 44–72)
NRBC # BLD AUTO: 0 K/UL
NRBC BLD-RTO: 0 /100 WBC (ref 0–0.2)
PLATELET # BLD AUTO: 321 K/UL (ref 164–446)
PMV BLD AUTO: 10.7 FL (ref 9–12.9)
POTASSIUM SERPL-SCNC: 4.2 MMOL/L (ref 3.6–5.5)
PROT SERPL-MCNC: 6.7 G/DL (ref 6–8.2)
RBC # BLD AUTO: 4.71 M/UL (ref 4.2–5.4)
SODIUM SERPL-SCNC: 140 MMOL/L (ref 135–145)
T3FREE SERPL-MCNC: 2.75 PG/ML (ref 2–4.4)
T4 FREE SERPL-MCNC: 1.84 NG/DL (ref 0.93–1.7)
TRIGL SERPL-MCNC: 74 MG/DL (ref 0–149)
TSH SERPL DL<=0.005 MIU/L-ACNC: 1.1 UIU/ML (ref 0.38–5.33)
WBC # BLD AUTO: 10.7 K/UL (ref 4.8–10.8)

## 2024-07-10 PROCEDURE — 84443 ASSAY THYROID STIM HORMONE: CPT

## 2024-07-10 PROCEDURE — 83036 HEMOGLOBIN GLYCOSYLATED A1C: CPT

## 2024-07-10 PROCEDURE — 82306 VITAMIN D 25 HYDROXY: CPT

## 2024-07-10 PROCEDURE — 85025 COMPLETE CBC W/AUTO DIFF WBC: CPT

## 2024-07-10 PROCEDURE — 82043 UR ALBUMIN QUANTITATIVE: CPT

## 2024-07-10 PROCEDURE — 84481 FREE ASSAY (FT-3): CPT

## 2024-07-10 PROCEDURE — 36415 COLL VENOUS BLD VENIPUNCTURE: CPT

## 2024-07-10 PROCEDURE — 80061 LIPID PANEL: CPT

## 2024-07-10 PROCEDURE — 84439 ASSAY OF FREE THYROXINE: CPT

## 2024-07-10 PROCEDURE — 82570 ASSAY OF URINE CREATININE: CPT

## 2024-07-10 PROCEDURE — 80053 COMPREHEN METABOLIC PANEL: CPT

## 2024-07-17 ENCOUNTER — OFFICE VISIT (OUTPATIENT)
Dept: MEDICAL GROUP | Facility: PHYSICIAN GROUP | Age: 59
End: 2024-07-17
Payer: COMMERCIAL

## 2024-07-17 VITALS
HEART RATE: 100 BPM | HEIGHT: 64 IN | TEMPERATURE: 97.9 F | SYSTOLIC BLOOD PRESSURE: 116 MMHG | OXYGEN SATURATION: 95 % | WEIGHT: 260.5 LBS | BODY MASS INDEX: 44.47 KG/M2 | DIASTOLIC BLOOD PRESSURE: 64 MMHG

## 2024-07-17 DIAGNOSIS — Z11.4 SCREENING FOR HIV WITHOUT PRESENCE OF RISK FACTORS: ICD-10-CM

## 2024-07-17 DIAGNOSIS — Z00.00 ENCOUNTER FOR WELL ADULT EXAM WITHOUT ABNORMAL FINDINGS: ICD-10-CM

## 2024-07-17 DIAGNOSIS — R73.03 PREDIABETES: ICD-10-CM

## 2024-07-17 DIAGNOSIS — E78.5 DYSLIPIDEMIA: ICD-10-CM

## 2024-07-17 DIAGNOSIS — Z23 NEED FOR VACCINATION: ICD-10-CM

## 2024-07-17 DIAGNOSIS — E89.0 POSTOPERATIVE HYPOTHYROIDISM: ICD-10-CM

## 2024-07-17 DIAGNOSIS — Z12.31 ENCOUNTER FOR SCREENING MAMMOGRAM FOR BREAST CANCER: ICD-10-CM

## 2024-07-17 DIAGNOSIS — Z00.00 ROUTINE HEALTH MAINTENANCE: ICD-10-CM

## 2024-07-17 DIAGNOSIS — E55.9 VITAMIN D DEFICIENCY: ICD-10-CM

## 2024-07-17 DIAGNOSIS — R60.0 BILATERAL LOWER EXTREMITY EDEMA: ICD-10-CM

## 2024-07-17 DIAGNOSIS — E66.01 MORBID OBESITY DUE TO EXCESS CALORIES (HCC): ICD-10-CM

## 2024-07-17 DIAGNOSIS — N95.0 POSTMENOPAUSAL BLEEDING: ICD-10-CM

## 2024-07-17 PROCEDURE — 99396 PREV VISIT EST AGE 40-64: CPT | Mod: 25 | Performed by: NURSE PRACTITIONER

## 2024-07-17 PROCEDURE — 90471 IMMUNIZATION ADMIN: CPT | Performed by: NURSE PRACTITIONER

## 2024-07-17 PROCEDURE — 3078F DIAST BP <80 MM HG: CPT | Performed by: NURSE PRACTITIONER

## 2024-07-17 PROCEDURE — 90746 HEPB VACCINE 3 DOSE ADULT IM: CPT | Performed by: NURSE PRACTITIONER

## 2024-07-17 PROCEDURE — 3074F SYST BP LT 130 MM HG: CPT | Performed by: NURSE PRACTITIONER

## 2024-07-17 ASSESSMENT — PATIENT HEALTH QUESTIONNAIRE - PHQ9: CLINICAL INTERPRETATION OF PHQ2 SCORE: 0

## 2024-07-17 ASSESSMENT — FIBROSIS 4 INDEX: FIB4 SCORE: 0.79

## 2024-07-29 ENCOUNTER — APPOINTMENT (RX ONLY)
Dept: URBAN - METROPOLITAN AREA CLINIC 4 | Facility: CLINIC | Age: 59
Setting detail: DERMATOLOGY
End: 2024-07-29

## 2024-07-29 DIAGNOSIS — D485 NEOPLASM OF UNCERTAIN BEHAVIOR OF SKIN: ICD-10-CM

## 2024-07-29 DIAGNOSIS — L81.4 OTHER MELANIN HYPERPIGMENTATION: ICD-10-CM

## 2024-07-29 DIAGNOSIS — D22 MELANOCYTIC NEVI: ICD-10-CM

## 2024-07-29 DIAGNOSIS — L82.1 OTHER SEBORRHEIC KERATOSIS: ICD-10-CM

## 2024-07-29 DIAGNOSIS — D18.0 HEMANGIOMA: ICD-10-CM

## 2024-07-29 DIAGNOSIS — Z71.89 OTHER SPECIFIED COUNSELING: ICD-10-CM

## 2024-07-29 PROBLEM — D18.01 HEMANGIOMA OF SKIN AND SUBCUTANEOUS TISSUE: Status: ACTIVE | Noted: 2024-07-29

## 2024-07-29 PROBLEM — D22.5 MELANOCYTIC NEVI OF TRUNK: Status: ACTIVE | Noted: 2024-07-29

## 2024-07-29 PROBLEM — D48.5 NEOPLASM OF UNCERTAIN BEHAVIOR OF SKIN: Status: ACTIVE | Noted: 2024-07-29

## 2024-07-29 PROBLEM — D22.72 MELANOCYTIC NEVI OF LEFT LOWER LIMB, INCLUDING HIP: Status: ACTIVE | Noted: 2024-07-29

## 2024-07-29 PROBLEM — D23.5 OTHER BENIGN NEOPLASM OF SKIN OF TRUNK: Status: ACTIVE | Noted: 2024-07-29

## 2024-07-29 PROCEDURE — 99203 OFFICE O/P NEW LOW 30 MIN: CPT | Mod: 25

## 2024-07-29 PROCEDURE — ? COUNSELING

## 2024-07-29 PROCEDURE — ? BIOPSY BY SHAVE METHOD

## 2024-07-29 PROCEDURE — 11102 TANGNTL BX SKIN SINGLE LES: CPT

## 2024-07-29 PROCEDURE — 11103 TANGNTL BX SKIN EA SEP/ADDL: CPT

## 2024-07-29 ASSESSMENT — LOCATION ZONE DERM
LOCATION ZONE: LEG
LOCATION ZONE: TRUNK

## 2024-07-29 ASSESSMENT — LOCATION DETAILED DESCRIPTION DERM
LOCATION DETAILED: UPPER STERNUM
LOCATION DETAILED: LEFT MEDIAL SUPERIOR CHEST
LOCATION DETAILED: RIGHT SUPERIOR MEDIAL UPPER BACK
LOCATION DETAILED: RIGHT MID-UPPER BACK
LOCATION DETAILED: RIGHT INFERIOR UPPER BACK
LOCATION DETAILED: LEFT ANTERIOR PROXIMAL THIGH

## 2024-07-29 ASSESSMENT — LOCATION SIMPLE DESCRIPTION DERM
LOCATION SIMPLE: LEFT THIGH
LOCATION SIMPLE: CHEST
LOCATION SIMPLE: RIGHT UPPER BACK

## 2024-07-30 ENCOUNTER — HOSPITAL ENCOUNTER (OUTPATIENT)
Dept: RADIOLOGY | Facility: MEDICAL CENTER | Age: 59
End: 2024-07-30
Attending: NURSE PRACTITIONER
Payer: COMMERCIAL

## 2024-07-30 DIAGNOSIS — Z12.31 ENCOUNTER FOR SCREENING MAMMOGRAM FOR BREAST CANCER: ICD-10-CM

## 2024-07-30 PROCEDURE — 77063 BREAST TOMOSYNTHESIS BI: CPT

## 2024-09-11 ENCOUNTER — NON-PROVIDER VISIT (OUTPATIENT)
Dept: MEDICAL GROUP | Facility: PHYSICIAN GROUP | Age: 59
End: 2024-09-11
Payer: COMMERCIAL

## 2024-09-11 DIAGNOSIS — Z23 NEED FOR VACCINATION: ICD-10-CM

## 2024-09-11 PROCEDURE — 90471 IMMUNIZATION ADMIN: CPT | Performed by: NURSE PRACTITIONER

## 2024-09-11 PROCEDURE — 90746 HEPB VACCINE 3 DOSE ADULT IM: CPT | Mod: JZ | Performed by: NURSE PRACTITIONER

## 2024-09-11 NOTE — PROGRESS NOTES
"Antonella Frausto is a 59 y.o. female here for a non-provider visit for:   HEPATITIS B 3 of 3    Reason for immunization: continue or complete series started at the office  Immunization records indicate need for vaccine: Yes, confirmed with Epic  Minimum interval has been met for this vaccine: Yes  ABN completed: Not Indicated    VIS Dated  5/12/23 was given to patient: Yes  All IAC Questionnaire questions were answered \"No.\"    Patient tolerated injection and no adverse effects were observed or reported: Yes    Pt scheduled for next dose in series: No  "

## 2024-09-18 ENCOUNTER — PHYSICAL THERAPY (OUTPATIENT)
Dept: PHYSICAL THERAPY | Facility: MEDICAL CENTER | Age: 59
End: 2024-09-18
Attending: NURSE PRACTITIONER
Payer: COMMERCIAL

## 2024-09-18 DIAGNOSIS — R60.0 BILATERAL LOWER EXTREMITY EDEMA: ICD-10-CM

## 2024-09-18 PROCEDURE — 97161 PT EVAL LOW COMPLEX 20 MIN: CPT

## 2024-09-18 PROCEDURE — 97535 SELF CARE MNGMENT TRAINING: CPT

## 2024-09-18 SDOH — ECONOMIC STABILITY: GENERAL: QUALITY OF LIFE: GOOD

## 2024-09-18 ASSESSMENT — ENCOUNTER SYMPTOMS
PAIN SCALE AT LOWEST: 2
EXACERBATED BY: STANDING
ALLEVIATING FACTORS: NOTHING
PAIN SCALE AT HIGHEST: 6
PAIN SCALE: 2
PAIN TIMING: ALL DAY
EXACERBATED BY: WALKING

## 2024-09-18 NOTE — OP THERAPY EVALUATION
Outpatient Physical Therapy  LYMPHEDEMA THERAPY INITIAL EVALUATION    [facility]  [address]  [place] [address]  Phone:  [phone]  Fax:  [phone]    Date of Evaluation: 2024    Patient: Antonella Frausto  YOB: 1965  MRN: [id number]     Referring Provider: KHOI Nieto  [address] [place] [address]   Referring Diagnosis Bilateral lower extremity edema [R60.0]     Time Calculation                       Chief Complaint: No chief complaint on file.    Visit Diagnoses     ICD-10-CM   1. Bilateral lower extremity edema  R60.0       Subjective:   History of Present Illness:     Mechanism of injury:  Patient has been dealing with swelling into her legs for at least 4 years. She reports that her brother did have lymphedema is his left leg from an accident and that she is familiar with the condition. She had her annual physical exam in July, and would like to know what if anything she can do to address her swelling. She does feel that the swelling into her feet will reduce slightly at night, but never fully resolves. She needs bilateral knee replacement surgery and is trying to lose weight in order to do this. She has lost 48lbs so far with diet modification since April, but has not seen a great reduction in the size of her legs. She remembers always having larger legs and being picked on as a teenager for her leg size even though she weighed 120lbs.     Quality of life:  Good  Headaches:  no headaches  Sleep disturbance:  Not disrupted  Pain:     Current pain ratin    At best pain ratin    At worst pain ratin    Location:  Heaviness into the limbs bilaterally    Pain timing:  All day    Relieving factors:  Nothing    Aggravating factors:  Walking and standing    Progression:  Unchanged  Social Support:     Lives in:  One-story house    Lives with:  Spouse  Treatments:     None    Patient  Goals:     Patient goals for therapy:  Decreased edema and decreased pain      Past Medical History:   Diagnosis Date    Arthritis     FEET & knees    Bronchitis 12/15/2014    on antibiotic    Hypothyroidism     Increased BMI     Pain 12/22/2014    right leg, feet, 4/10    Postmenopausal bleeding 07/25/2022    Prediabetes 02/13/2020    Venous thrombosis 08/16/2018     Past Surgical History:   Procedure Laterality Date    HYSTEROSCOPY WITH MYOSURE  11/18/2022    Procedure: HYSTEROSCOPY WITH POLYPECTOMY;  Surgeon: Amarilys James M.D.;  Location: SURGERY SAME DAY [facility];  Service: Gynecology    KNEE ARTHROSCOPY  12/29/2014    Performed by Chas Titus M.D. at SURGERY SAME DAY [facility] ORS    MENISCECTOMY, KNEE, MEDIAL  12/29/2014    Performed by Chas Titus M.D. at SURGERY SAME DAY [facility] ORS    CHONDROPLASTY  12/29/2014    Performed by Chas Titus M.D. at SURGERY SAME DAY [facility] ORS    THYROIDECTOMY TOTAL  10/20/2010    Performed by SHABBIR BROOKE at SURGERY SAME DAY [facility] ORS    NODE BIOPSY  10/20/2010    Performed by SHABBIR BROOKE at SURGERY SAME DAY [facility] ORS    CHOLECYSTECTOMY  1995    GYN SURGERY      D & C with miscarrage      Social History     Tobacco Use    Smoking status: Never     Passive exposure: Never    Smokeless tobacco: Never   Substance Use Topics    Alcohol use: No     Family and Occupational History     Socioeconomic History    Marital status:      Spouse name: Shakeel Frausto    Number of children: 3    Years of education: Not on file    Highest education level: Not on file   Occupational History    Not on file       Lymphedema Objective    Visit type  Lipedema and Lipolymphedema    Postural Observation  Seated posture is fair  Standing posture is fair  Correction of posture has no consistent effect      Skin Appearance    moderate edema, Stovepipe shape of limbs with sparing of swelling into the feet, high distribution of adipose into the hips, mild  varicosities. Patient reports that her legs will often feel bruised or tender  .    Sensation      Left Lower Extremity    Light tough is Impaired    Right Lower Extremity    Light touch is Impaired    Sensation Comments  Hypersensitivity  Left Lower Extremity Circumferential Measurements  Waist: 105.3 cm  Hip: 138.3 cm  Scrotum: cm  Ground/Upper Thigh: 74.9 cm  Mid Thigh: 65.4 cm  Knee: 57.4 cm  Upper Calf: 57.1 cm  Mid Calf: 53.9 cm  Ankle: 40.1 cm  Heel to Foot: 24.9 cm  Total: 617.3 cm    Right Lower Extremity Circumferential Measurements  Waist: 105.3 cm  Hip: 138.3 cm  Scrotum: cm  Ground/Upper Thigh: 73.9 cm  Mid Thigh: 59.9 cm  Knee: 52.4 cm  Upper Calf: 58.6 cm  Mid Calf: 51.4 cm  Ankle: 38.6 cm  Heel to Foot: 24.6 cm  Total: 603 cm    Lymphedema Stage  Stage 2 Lymphedema          Therapeutic Treatments and Modalities:     1. Self Care ADL Training (CPT 17684)    Therapeutic Treatment and Modalities Summary: Pt has been educated on the pathogenesis of lipedema (fluid in fat) including the following: lipedema is a symmetrical, painful fat disorder affecting subcutaneous adipose tissue mostly in the hips, buttocks, and legs. Often, this fat accumulation does not respond to traditional weight loss strategies of diet and exercise. While the cause is not completely understood, current thinking is that hormonal changes impact the quality of blood vessels and lymphatics such that they become leaky. It is therefore common to feel as though the limb is painful and heavy due to the accumulation of fat and fluid. In addition, sometimes limbs can easily bruise.   Provided education with regards to compression options available to patient. Included samples of circular knit garments, flat knit garments, and in-elastic garments. Benefits, cost,  as well as therapeutic effect of each garment reviewed with patient in order to assist with correct garment selection.          Time-based treatments/modalities:            Assessment and Plan:   Functional Impairments: lacks appropriate home exercise program, limited mobility, pain with function and swelling    Assessment details:  Patient is a 59 year old female who presents to therapy for evaluation and treatment of chronic bilateral lower extremity swelling. Clinical examination demonstrates the following impairments: swelling that is complicated by adipose distribution that presents into the hips, thighs, knees, and calves with sparing of the feet bilaterally, tenderness to palpation of the calves, minimal improvement of swelling with weight loss, and family history lower extremity swelling. These impairments limit patients ability to: manage the health and integrity of her lymphatic and integumentary system and are consistent with diagnosis of Stage II Lipedema. Patient would benefit from complete decongestive therapy including: patient education, manual lymphatic drainage, compression garments as needed, and therapeutic exercise to improve lymphatic circulation, musculoskeletal strength and mobility, and prevent further functional decline. If you have any questions regarding this plan of care please feel free to contact our clinic at [phone]. Thank you for the opportunity to work with Antonella!    Barriers to therapy:  Comorbidities  Prognosis: good      Goals:   Short Term Goals:  1 - Patient will be able to tolerate wearing reduction kit to bilateral lower extremities for 12/24 hours  2 - Patient will have no less than a 2 cm reduction in limb volume bilaterally   3 - Patient will be independent in HEP targeted for lymphatic fluid reduction   Short term goal time span:  4-6 weeks    Long Term Goals:  1 - Patient will be independent with maintenance phase management of lymphedema including: compression garments, skin care education, risk reduction strategies, manual lymphatic drainage, and therapeutic exercise  2 - Patient will have no less than a 3 cm reduction in  limb volume bilaterally    Long term goal time span:  2-4 months    Plan:  Therapy options:  Physical therapy treatment to continue  Planned therapy interventions:  Addressing equipment needs, compression bandaging, compression stocking, decongestive exercises, home exercise program, intermittent compression, manual lymph drainage, Velcro wraps, referral for compression garment & instructions for don/doffing, patient education and range of motion exercises  Planned education:  Functional anatomy and physiology of the lymphatic system, pathophysiology of lymphedema, lymphedema exercise, proper skin care/nutrition, lymphedema precautions, compression bandaging, self massage, long term self-management of lymphedema, home pump use and activity guidelines  Frequency:  1x week  Duration in weeks:  12  Duration in visits:  12  Discussed with:  Patient      Functional Assessment Used        Referring provider co-signature:  I have reviewed this plan of care and my co-signature certifies the need for services.    Certification Period: 09/18/2024 to  11/17/24    Physician Signature: ________________________________ Date: ______________

## 2024-09-25 ENCOUNTER — APPOINTMENT (OUTPATIENT)
Dept: PHYSICAL THERAPY | Facility: MEDICAL CENTER | Age: 59
End: 2024-09-25
Attending: NURSE PRACTITIONER
Payer: COMMERCIAL

## 2024-09-26 DIAGNOSIS — E89.0 POSTOPERATIVE HYPOTHYROIDISM: ICD-10-CM

## 2024-09-26 RX ORDER — LEVOTHYROXINE SODIUM 150 UG/1
150 TABLET ORAL
Qty: 90 TABLET | Refills: 1 | Status: SHIPPED | OUTPATIENT
Start: 2024-09-26

## 2024-09-26 NOTE — TELEPHONE ENCOUNTER
Received request via: Pharmacy    Was the patient seen in the last year in this department? Yes    Does the patient have an active prescription (recently filled or refills available) for medication(s) requested? No    [facility]    Does the patient have intermediate Plus and need 100-day supply? (This applies to ALL medications) Patient does not have SCP

## 2024-09-26 NOTE — TELEPHONE ENCOUNTER
Requested Prescriptions     Pending Prescriptions Disp Refills    levothyroxine (SYNTHROID) 150 MCG Tab [Pharmacy Med Name: LEVOTHYROXINE 0.150MG (150MCG) TAB] 90 Tablet 1     Sig: TAKE 1 TABLET BY MOUTH EVERY MORNING ON AN EMPTY STOMACH       TOVA Nieto.

## 2024-10-09 ENCOUNTER — APPOINTMENT (OUTPATIENT)
Dept: PHYSICAL THERAPY | Facility: MEDICAL CENTER | Age: 59
End: 2024-10-09
Attending: NURSE PRACTITIONER
Payer: COMMERCIAL

## 2024-10-14 PROBLEM — M17.9 OSTEOARTHRITIS, KNEE: Status: ACTIVE | Noted: 2024-10-14

## 2024-10-16 ENCOUNTER — PHYSICAL THERAPY (OUTPATIENT)
Dept: PHYSICAL THERAPY | Facility: MEDICAL CENTER | Age: 59
End: 2024-10-16
Attending: NURSE PRACTITIONER
Payer: COMMERCIAL

## 2024-10-16 DIAGNOSIS — R60.0 BILATERAL LOWER EXTREMITY EDEMA: ICD-10-CM

## 2024-10-16 PROCEDURE — 97140 MANUAL THERAPY 1/> REGIONS: CPT

## 2024-10-16 SDOH — ECONOMIC STABILITY: GENERAL: QUALITY OF LIFE: GOOD

## 2024-10-16 ASSESSMENT — ENCOUNTER SYMPTOMS
EXACERBATED BY: WALKING
ALLEVIATING FACTORS: NOTHING
PAIN SCALE AT HIGHEST: 6
PAIN SCALE AT LOWEST: 2
EXACERBATED BY: STANDING
PAIN TIMING: ALL DAY
PAIN SCALE: 2

## 2024-10-23 ENCOUNTER — APPOINTMENT (OUTPATIENT)
Dept: PHYSICAL THERAPY | Facility: MEDICAL CENTER | Age: 59
End: 2024-10-23
Attending: NURSE PRACTITIONER
Payer: COMMERCIAL

## 2024-10-30 ENCOUNTER — PHYSICAL THERAPY (OUTPATIENT)
Dept: PHYSICAL THERAPY | Facility: MEDICAL CENTER | Age: 59
End: 2024-10-30
Attending: NURSE PRACTITIONER
Payer: COMMERCIAL

## 2024-10-30 DIAGNOSIS — R60.0 BILATERAL LOWER EXTREMITY EDEMA: ICD-10-CM

## 2024-10-30 PROCEDURE — 97530 THERAPEUTIC ACTIVITIES: CPT

## 2024-10-30 PROCEDURE — 97140 MANUAL THERAPY 1/> REGIONS: CPT

## 2024-10-30 PROCEDURE — 97535 SELF CARE MNGMENT TRAINING: CPT

## 2024-10-30 SDOH — ECONOMIC STABILITY: GENERAL: QUALITY OF LIFE: GOOD

## 2024-10-30 ASSESSMENT — ENCOUNTER SYMPTOMS
PAIN SCALE AT HIGHEST: 6
EXACERBATED BY: STANDING
PAIN TIMING: ALL DAY
PAIN SCALE: 2
EXACERBATED BY: WALKING
ALLEVIATING FACTORS: NOTHING
PAIN SCALE AT LOWEST: 2

## 2024-11-01 ENCOUNTER — OFFICE VISIT (OUTPATIENT)
Dept: URGENT CARE | Facility: PHYSICIAN GROUP | Age: 59
End: 2024-11-01
Payer: COMMERCIAL

## 2024-11-01 VITALS
HEIGHT: 64 IN | HEART RATE: 100 BPM | TEMPERATURE: 98.5 F | RESPIRATION RATE: 20 BRPM | BODY MASS INDEX: 41.03 KG/M2 | WEIGHT: 240.3 LBS | OXYGEN SATURATION: 97 % | DIASTOLIC BLOOD PRESSURE: 74 MMHG | SYSTOLIC BLOOD PRESSURE: 118 MMHG

## 2024-11-01 DIAGNOSIS — U07.1 COVID-19: ICD-10-CM

## 2024-11-01 DIAGNOSIS — Z20.822 SUSPECTED COVID-19 VIRUS INFECTION: ICD-10-CM

## 2024-11-01 DIAGNOSIS — J06.9 UPPER RESPIRATORY TRACT INFECTION, UNSPECIFIED TYPE: ICD-10-CM

## 2024-11-01 LAB
FLUAV RNA SPEC QL NAA+PROBE: NEGATIVE
FLUBV RNA SPEC QL NAA+PROBE: NEGATIVE
RSV RNA SPEC QL NAA+PROBE: NEGATIVE
SARS-COV-2 RNA RESP QL NAA+PROBE: POSITIVE

## 2024-11-01 PROCEDURE — 0241U POCT CEPHEID COV-2, FLU A/B, RSV - PCR: CPT | Performed by: NURSE PRACTITIONER

## 2024-11-01 PROCEDURE — 3078F DIAST BP <80 MM HG: CPT | Performed by: NURSE PRACTITIONER

## 2024-11-01 PROCEDURE — 3074F SYST BP LT 130 MM HG: CPT | Performed by: NURSE PRACTITIONER

## 2024-11-01 PROCEDURE — 99213 OFFICE O/P EST LOW 20 MIN: CPT | Performed by: NURSE PRACTITIONER

## 2024-11-01 RX ORDER — BENZONATATE 100 MG/1
100 CAPSULE ORAL 3 TIMES DAILY PRN
Qty: 30 CAPSULE | Refills: 0 | Status: SHIPPED | OUTPATIENT
Start: 2024-11-01 | End: 2024-11-11

## 2024-11-01 ASSESSMENT — ENCOUNTER SYMPTOMS
COUGH: 1
VOMITING: 0
SORE THROAT: 0

## 2024-11-01 ASSESSMENT — FIBROSIS 4 INDEX: FIB4 SCORE: 0.81

## 2024-11-01 NOTE — PROGRESS NOTES
"Subjective:   Antonella Frausto  is a 59 y.o. female who presents for Cough (Scratchy throat and cough, nasal congestion  x 2 days)       URI   This is a new problem. The current episode started in the past 7 days. The problem has been gradually worsening. There has been no fever. Associated symptoms include congestion and coughing. Pertinent negatives include no chest pain, ear pain, sore throat or vomiting. She has tried decongestant for the symptoms. The treatment provided no relief.   Patient's  was here on Tuesday and diagnosed with COVID.    Review of Systems   HENT:  Positive for congestion. Negative for ear pain and sore throat.    Respiratory:  Positive for cough.    Cardiovascular:  Negative for chest pain.   Gastrointestinal:  Negative for vomiting.         CURRENT MEDICATIONS:  cetirizine Tabs  levothyroxine Tabs  Magnesium Tabs  meloxicam  montelukast Tabs  Potassium Tabs  triamcinolone acetonide Crea  Vitamin C Tabs  VITAMIN K2-VITAMIN D3 PO  Zinc Tabs  Allergies:     Allergies   Allergen Reactions    Asa [Aspirin] Vomiting and Nausea    Codeine Vomiting and Nausea    Latex      \"irritates my skin\"     Current Problems: Antonella Frausto does not have any pertinent problems on file.  Past Surgical Hx:    Past Surgical History:   Procedure Laterality Date    HYSTEROSCOPY WITH MYOSURE  11/18/2022    Procedure: HYSTEROSCOPY WITH POLYPECTOMY;  Surgeon: Amarilys James M.D.;  Location: SURGERY SAME DAY [facility];  Service: Gynecology    KNEE ARTHROSCOPY  12/29/2014    Performed by Chas Titus M.D. at SURGERY SAME DAY [facility] ORS    MENISCECTOMY, KNEE, MEDIAL  12/29/2014    Performed by Chas Titus M.D. at SURGERY SAME DAY [facility] ORS    CHONDROPLASTY  12/29/2014    Performed by Chas Titus M.D. at SURGERY SAME DAY [facility] ORS    THYROIDECTOMY TOTAL  10/20/2010    Performed by SHABBIR BROOKE at SURGERY SAME DAY [facility] ORS    NODE BIOPSY  10/20/2010    Performed by SHABBIR BROOKE" "L at SURGERY SAME DAY [facility] ORS    CHOLECYSTECTOMY  1995    GYN SURGERY      D & C with miscarrage       Past Social Hx:  reports that she has never smoked. She has never been exposed to tobacco smoke. She has never used smokeless tobacco. She reports that she does not drink alcohol and does not use drugs.    Objective:   /74   Pulse 100   Temp 36.9 °C (98.5 °F) (Temporal)   Resp 20   Ht 1.626 m (5' 4\")   Wt 109 kg (240 lb 4.8 oz)   LMP 07/17/2012 (Within Years)   SpO2 97%   BMI 41.25 kg/m²   Physical Exam  Vitals and nursing note reviewed.   Constitutional:       General: She is not in acute distress.     Appearance: Normal appearance. She is normal weight. She is ill-appearing. She is not toxic-appearing or diaphoretic.   HENT:      Head: Normocephalic and atraumatic.      Right Ear: External ear normal. A middle ear effusion is present.      Left Ear: External ear normal. A middle ear effusion is present.      Nose: Congestion and rhinorrhea present.      Mouth/Throat:      Mouth: Mucous membranes are moist.      Pharynx: No oropharyngeal exudate or posterior oropharyngeal erythema.      Comments: Hoarse nasally voice  Eyes:      Extraocular Movements: Extraocular movements intact.      Conjunctiva/sclera: Conjunctivae normal.      Pupils: Pupils are equal, round, and reactive to light.   Cardiovascular:      Rate and Rhythm: Regular rhythm. Tachycardia present.      Pulses: Normal pulses.      Heart sounds: Normal heart sounds.   Pulmonary:      Effort: Pulmonary effort is normal. No respiratory distress.      Breath sounds: Normal breath sounds. No decreased breath sounds, wheezing, rhonchi or rales.   Abdominal:      Palpations: Abdomen is soft.      Tenderness: There is no abdominal tenderness.   Musculoskeletal:         General: Normal range of motion.      Cervical back: Normal range of motion and neck supple.   Skin:     General: Skin is warm and dry.      Findings: No rash. "   Neurological:      General: No focal deficit present.      Mental Status: She is alert and oriented to person, place, and time.   Psychiatric:         Mood and Affect: Mood normal.         Behavior: Behavior normal.       Assessment/Plan:   1. Suspected COVID-19 virus infection  - POCT CEPHEID COV-2, FLU A/B, RSV - PCR    2. Upper respiratory tract infection, unspecified type  - POCT CEPHEID COV-2, FLU A/B, RSV - PCR  - benzonatate (TESSALON) 100 MG Cap; Take 1 Capsule by mouth 3 times a day as needed for Cough for up to 10 days.  Dispense: 30 Capsule; Refill: 0    59-year-old female presents with suspected COVID.  Her  was diagnosed on Tuesday.  Her symptoms began on Wednesday.  Vital signs stable, mild.  No acute distress does appear mildly ill.  She has obvious sinus congestion, hoarse nasally voice, bilateral middle ear effusion.  Exam is otherwise negative.  Viral point-of-care testing completed, will contact patient with any positive results. Recommend adequate hydration, rest, deep breathing and coughing, ambulation as tolerated, OTC medications. RTC PRN worsening condition or other concerns. May use Ponaris nasal emollient, saline nasal spray for nasal congestion or to prevent nasal passage dryness or allergies. 2:15 PM  Point-of-care testing positive for COVID.  Paxlovid sent to pharmacy.Most recent GFR was 83, completed July 10, 2024.  For my MDM, I have personally reviewed previous notes, and test results as pertinent to today's visit. Red flags, RTC and ER precautions discussed, with patient confirming their understanding of  need for immediate follow-up.  Discussed medication management options, risks and benefits, and alternatives to treatment plan agreed upon. Instructed to continue  medications without changes as ordered by primary care unless aforementioned above.  Patient expresses understanding and agrees to plan of care. All questions or concerns answered.     Please note that this  dictation was created using voice recognition software. I have made every reasonable attempt to correct obvious errors,  but there may be grammar errors, and possibly content that I did not discover before finalizing the note.   This note was electronically signed by KHOI Oliveira

## 2024-11-01 NOTE — PATIENT INSTRUCTIONS
Upper Respiratory Infection, Adult  An upper respiratory infection (URI) is a common viral infection of the nose, throat, and upper air passages that lead to the lungs. The most common type of URI is the common cold. URIs usually get better on their own, without medical treatment.  What are the causes?  A URI is caused by a virus. You may catch a virus by:  Breathing in droplets from an infected person's cough or sneeze.  Touching something that has been exposed to the virus (is contaminated) and then touching your mouth, nose, or eyes.  What increases the risk?  You are more likely to get a URI if:  You are very young or very old.  You have close contact with others, such as at work, school, or a health care facility.  You smoke.  You have long-term (chronic) heart or lung disease.  You have a weakened disease-fighting system (immune system).  You have nasal allergies or asthma.  You are experiencing a lot of stress.  You have poor nutrition.  What are the signs or symptoms?  A URI usually involves some of the following symptoms:  Runny or stuffy (congested) nose.  Cough.  Sneezing.  Sore throat.  Headache.  Fatigue.  Fever.  Loss of appetite.  Pain in your forehead, behind your eyes, and over your cheekbones (sinus pain).  Muscle aches.  Redness or irritation of the eyes.  Pressure in the ears or face.  How is this diagnosed?  This condition may be diagnosed based on your medical history and symptoms, and a physical exam. Your health care provider may use a swab to take a mucus sample from your nose (nasal swab). This sample can be tested to determine what virus is causing the illness.  How is this treated?  URIs usually get better on their own within 7-10 days. Medicines cannot cure URIs, but your health care provider may recommend certain medicines to help relieve symptoms, such as:  Over-the-counter cold medicines.  Cough suppressants. Coughing is a type of defense against infection that helps to clear the  respiratory system, so take these medicines only as recommended by your health care provider.  Fever-reducing medicines.  Follow these instructions at home:  Activity  Rest as needed.  If you have a fever, stay home from work or school until your fever is gone or until your health care provider says your URI cannot spread to other people (is no longer contagious). Your health care provider may have you wear a face mask to prevent your infection from spreading.  Relieving symptoms  Gargle with a mixture of salt and water 3-4 times a day or as needed. To make salt water, completely dissolve ½-1 tsp (3-6 g) of salt in 1 cup (237 mL) of warm water.  Use a cool-mist humidifier to add moisture to the air. This can help you breathe more easily.  Eating and drinking    Drink enough fluid to keep your urine pale yellow.  Eat soups and other clear broths.  General instructions    Take over-the-counter and prescription medicines only as told by your health care provider. These include cold medicines, fever reducers, and cough suppressants.  Do not use any products that contain nicotine or tobacco. These products include cigarettes, chewing tobacco, and vaping devices, such as e-cigarettes. If you need help quitting, ask your health care provider.  Stay away from secondhand smoke.  Stay up to date on all immunizations, including the yearly (annual) flu vaccine.  Keep all follow-up visits. This is important.  How to prevent the spread of infection to others  URIs can be contagious. To prevent the infection from spreading:  Wash your hands with soap and water for at least 20 seconds. If soap and water are not available, use hand .  Avoid touching your mouth, face, eyes, or nose.  Cough or sneeze into a tissue or your sleeve or elbow instead of into your hand or into the air.    Contact a health care provider if:  You are getting worse instead of better.  You have a fever or chills.  Your mucus is brown or red.  You have  yellow or brown discharge coming from your nose.  You have pain in your face, especially when you bend forward.  You have swollen neck glands.  You have pain while swallowing.  You have white areas in the back of your throat.  Get help right away if:  You have shortness of breath that gets worse.  You have severe or persistent:  Headache.  Ear pain.  Sinus pain.  Chest pain.  You have chronic lung disease along with any of the following:  Making high-pitched whistling sounds when you breathe, most often when you breathe out (wheezing).  Prolonged cough (more than 14 days).  Coughing up blood.  A change in your usual mucus.  You have a stiff neck.  You have changes in your:  Vision.  Hearing.  Thinking.  Mood.  These symptoms may be an emergency. Get help right away. Call 911.  Do not wait to see if the symptoms will go away.  Do not drive yourself to the hospital.  Summary  An upper respiratory infection (URI) is a common infection of the nose, throat, and upper air passages that lead to the lungs.  A URI is caused by a virus.  URIs usually get better on their own within 7-10 days.  Medicines cannot cure URIs, but your health care provider may recommend certain medicines to help relieve symptoms.  This information is not intended to replace advice given to you by your health care provider. Make sure you discuss any questions you have with your health care provider.  Document Revised: 07/20/2022 Document Reviewed: 07/20/2022  [employer] Patient Education © 2023 [employer]

## 2024-11-06 ENCOUNTER — PHYSICAL THERAPY (OUTPATIENT)
Dept: PHYSICAL THERAPY | Facility: MEDICAL CENTER | Age: 59
End: 2024-11-06
Attending: NURSE PRACTITIONER
Payer: COMMERCIAL

## 2024-11-06 DIAGNOSIS — R60.0 BILATERAL LOWER EXTREMITY EDEMA: ICD-10-CM

## 2024-11-06 PROCEDURE — 97530 THERAPEUTIC ACTIVITIES: CPT

## 2024-11-06 PROCEDURE — 97140 MANUAL THERAPY 1/> REGIONS: CPT

## 2024-11-06 SDOH — ECONOMIC STABILITY: GENERAL: QUALITY OF LIFE: GOOD

## 2024-11-06 ASSESSMENT — ENCOUNTER SYMPTOMS
PAIN SCALE AT HIGHEST: 6
PAIN SCALE AT LOWEST: 2
PAIN SCALE: 2
EXACERBATED BY: WALKING
ALLEVIATING FACTORS: NOTHING
PAIN TIMING: ALL DAY
EXACERBATED BY: STANDING

## 2024-11-06 NOTE — OP THERAPY DAILY TREATMENT
Outpatient Physical Therapy  LYMPHEDEMA THERAPY DAILY TREATMENT     [facility] Outpatient Physical Therapy  [address]  [place] [address]  Phone:  [phone]  Fax:  [phone]    Date: 2024    Patient: Antonella Frausto  YOB: 1965  MRN: [id number]     Time Calculation    Start time: 0815  Stop time: 0900 Time Calculation (min): 45 minutes         Chief Complaint: Lymphedema Aquired    Visit #: 4    Subjective:   History of Present Illness:     Mechanism of injury:  Patient has been wearing her reduction kits during the day and taking them down at night. She has lost 7lbs in the last week, and was surprised to see that number when she stepped on the scale. She will have her pre-op appointment for her R TKA on 24      Quality of life:  Good  Headaches:  no headaches  Sleep disturbance:  Not disrupted  Pain:     Current pain ratin    At best pain ratin    At worst pain ratin    Location:  Heaviness into the limbs bilaterally    Pain timing:  All day    Relieving factors:  Nothing    Aggravating factors:  Walking and standing    Progression:  Unchanged  Social Support:     Lives in:  One-story house    Lives with:  Spouse  Treatments:     None    Patient Goals:     Patient goals for therapy:  Decreased edema and decreased pain      Lymphedema Objective    Visit type  Lipedema and Lipolymphedema    Postural Observation  Seated posture is fair  Standing posture is fair  Correction of posture has no consistent effect      Skin Appearance    moderate edema, Stovepipe shape of limbs with sparing of swelling into the feet, high distribution of adipose into the hips, mild varicosities. Patient reports that her legs will often feel bruised or tender  .    Sensation      Left Lower Extremity    Light tough is Impaired    Right Lower Extremity    Light touch is Impaired    Sensation Comments  Hypersensitivity  Left Lower Extremity  Circumferential Measurements  Waist: 105.3 cm  Hip: 138.3 cm  Scrotum: cm  Ground/Upper Thigh: 74.9 cm  Mid Thigh: 65.4 cm  Knee: 57.4 cm  Upper Calf: 53.7 cm  Mid Calf: 52 cm  Ankle: 39.5 cm  Heel to Foot: 25 cm  Total: 611.5 cm    Right Lower Extremity Circumferential Measurements  Waist: 105.3 cm  Hip: 138.3 cm  Scrotum: cm  Ground/Upper Thigh: 73.9 cm  Mid Thigh: 59.9 cm  Knee: 49 cm  Upper Calf: 56 cm  Mid Calf: 49 cm  Ankle: 33 cm  Heel to Foot: 24 cm  Total: 588.4 cm    Lymphedema Stage  Stage 2 Lymphedema            Therapeutic Treatments and Modalities:     1. Therapeutic Activities (CPT 38694), Measurements and initial re-fitting of bilateral full LE reduction kits    2. Manual Therapy (CPT 28329)    Therapeutic Treatment and Modalities Summary: Short below the knee MLD sequence bilaterally   The purpose of Manual Lymph Drainage (MLD) is to reduce lymph volume in the affected limb by increasing intake of lymphatic load into the lymphatic system, increasing the volume of transported lymph fluid, moving lymph fluid in superficial lymph vessels to collateral lymph collectors, anastomoses, or tissue channels, and increasing venous return. The goal of MLD is to re-route the lymph flow around blocked areas into more centrally located healthy lymph vessels, which drain into the venous system.      Time-based treatments/modalities:    Physical Therapy Timed Treatment Charges  Manual therapy minutes (CPT 57837): 22 minutes  Therapeutic activity minutes (CPT 70884): 23 minutes      Assessment and Plan:   Assessment details:  Patient has excellent response to initial reduction with circaid reduction kits. She has lost roughly two inches from the RLE and 1 inch from the LLE. Will continue with CDT leading into her operation and will reach out to [employer] regarding her pump status  Barriers to therapy:  Comorbidities  Prognosis: good      Goals:   Short Term Goals:  1 - Patient will be able to tolerate wearing reduction  kit to bilateral lower extremities for 12/24 hours  2 - Patient will have no less than a 2 cm reduction in limb volume bilaterally   3 - Patient will be independent in HEP targeted for lymphatic fluid reduction   Short term goal time span:  4-6 weeks    Long Term Goals:  1 - Patient will be independent with maintenance phase management of lymphedema including: compression garments, skin care education, risk reduction strategies, manual lymphatic drainage, and therapeutic exercise  2 - Patient will have no less than a 3 cm reduction in limb volume bilaterally    Long term goal time span:  2-4 months    Plan:  Therapy options:  Physical therapy treatment to continue  Planned therapy interventions:  Addressing equipment needs, compression bandaging, compression stocking, decongestive exercises, home exercise program, intermittent compression, manual lymph drainage, Velcro wraps, referral for compression garment & instructions for don/doffing, patient education and range of motion exercises  Planned education:  Functional anatomy and physiology of the lymphatic system, pathophysiology of lymphedema, lymphedema exercise, proper skin care/nutrition, lymphedema precautions, compression bandaging, self massage, long term self-management of lymphedema, home pump use and activity guidelines  Frequency:  1x week  Duration in weeks:  12  Duration in visits:  12  Discussed with:  Patient

## 2024-11-13 ENCOUNTER — PHYSICAL THERAPY (OUTPATIENT)
Dept: PHYSICAL THERAPY | Facility: MEDICAL CENTER | Age: 59
End: 2024-11-13
Attending: NURSE PRACTITIONER
Payer: COMMERCIAL

## 2024-11-13 DIAGNOSIS — R60.0 BILATERAL LOWER EXTREMITY EDEMA: ICD-10-CM

## 2024-11-13 PROCEDURE — 97140 MANUAL THERAPY 1/> REGIONS: CPT

## 2024-11-13 PROCEDURE — 97530 THERAPEUTIC ACTIVITIES: CPT

## 2024-11-13 SDOH — ECONOMIC STABILITY: GENERAL: QUALITY OF LIFE: GOOD

## 2024-11-13 ASSESSMENT — ENCOUNTER SYMPTOMS
PAIN TIMING: ALL DAY
EXACERBATED BY: WALKING
PAIN SCALE: 2
PAIN SCALE AT LOWEST: 2
EXACERBATED BY: STANDING
PAIN SCALE AT HIGHEST: 6
ALLEVIATING FACTORS: NOTHING

## 2024-11-13 NOTE — OP THERAPY DAILY TREATMENT
Outpatient Physical Therapy  LYMPHEDEMA THERAPY DAILY TREATMENT     [facility] Outpatient Physical Therapy  [address]  [place] [address]  Phone:  [phone]  Fax:  [phone]    Date: 2024    Patient: Antonella Frausto  YOB: 1965  MRN: [id number]     Time Calculation    Start time: 0815  Stop time: 0858 Time Calculation (min): 43 minutes         Chief Complaint: Lymphedema Aquired    Visit #: 5    Subjective:   History of Present Illness:     Mechanism of injury:  Patient reports that she didn't lose any weight this week, but has been diligent with wearing her reduction kits. She has her pre-op appointment on the  and is looking forward to taking her next steps    Quality of life:  Good  Headaches:  no headaches  Sleep disturbance:  Not disrupted  Pain:     Current pain ratin    At best pain ratin    At worst pain ratin    Location:  Heaviness into the limbs bilaterally    Pain timing:  All day    Relieving factors:  Nothing    Aggravating factors:  Walking and standing    Progression:  Unchanged  Social Support:     Lives in:  One-story house    Lives with:  Spouse  Treatments:     None    Patient Goals:     Patient goals for therapy:  Decreased edema and decreased pain      Lymphedema Objective    Visit type  Lipedema and Lipolymphedema    Postural Observation  Seated posture is fair  Standing posture is fair  Correction of posture has no consistent effect      Skin Appearance    moderate edema,  .    Sensation      Left Lower Extremity    Light tough is Impaired    Right Lower Extremity    Light touch is Impaired    Sensation Comments  Hypersensitivity  Left Lower Extremity Circumferential Measurements  Waist: 105.3 cm  Hip: 138.3 cm  Scrotum: cm  Ground/Upper Thigh: 74.9 cm  Mid Thigh: 65.4 cm  Knee: 57.4 cm  Upper Calf: 53.7 cm  Mid Calf: 49.9 cm  Ankle: 37.1 cm  Heel to Foot: 24.1 cm  Total: 606.1 cm    Right Lower Extremity  Circumferential Measurements  Waist: 105.3 cm  Hip: 138.3 cm  Scrotum: cm  Ground/Upper Thigh: 73.9 cm  Mid Thigh: 59.9 cm  Knee: 46.5 cm  Upper Calf: 54.3 cm  Mid Calf: 47.6 cm  Ankle: 31.3 cm  Heel to Foot: 23.4 cm  Total: 580.5 cm    Lymphedema Stage  Stage 2 Lymphedema            Therapeutic Treatments and Modalities:     1. Therapeutic Activities (CPT 93386), Measurements and initial re-fitting of bilateral full LE reduction kits    2. Manual Therapy (CPT 65913)    Therapeutic Treatment and Modalities Summary: Short below the knee MLD sequence bilaterally   The purpose of Manual Lymph Drainage (MLD) is to reduce lymph volume in the affected limb by increasing intake of lymphatic load into the lymphatic system, increasing the volume of transported lymph fluid, moving lymph fluid in superficial lymph vessels to collateral lymph collectors, anastomoses, or tissue channels, and increasing venous return. The goal of MLD is to re-route the lymph flow around blocked areas into more centrally located healthy lymph vessels, which drain into the venous system.      Time-based treatments/modalities:    Physical Therapy Timed Treatment Charges  Manual therapy minutes (CPT 46453): 16 minutes  Therapeutic activity minutes (CPT 55932): 27 minutes      Assessment and Plan:   Assessment details:  Patient continues to show reduction throughout BLE's. Do think that she is ready to transition to her maintenance garments and I will also look to see if there are any medical grade leggings that can be ordered as well. Will contact [employer] regarding her pump   Barriers to therapy:  Comorbidities  Prognosis: good      Goals:   Short Term Goals:  1 - Patient will be able to tolerate wearing reduction kit to bilateral lower extremities for 12/24 hours  2 - Patient will have no less than a 2 cm reduction in limb volume bilaterally   3 - Patient will be independent in HEP targeted for lymphatic fluid reduction   Short term goal time span:   4-6 weeks    Long Term Goals:  1 - Patient will be independent with maintenance phase management of lymphedema including: compression garments, skin care education, risk reduction strategies, manual lymphatic drainage, and therapeutic exercise  2 - Patient will have no less than a 3 cm reduction in limb volume bilaterally    Long term goal time span:  2-4 months    Plan:  Therapy options:  Physical therapy treatment to continue  Planned therapy interventions:  Addressing equipment needs, compression bandaging, compression stocking, decongestive exercises, home exercise program, intermittent compression, manual lymph drainage, Velcro wraps, referral for compression garment & instructions for don/doffing, patient education and range of motion exercises  Planned education:  Functional anatomy and physiology of the lymphatic system, pathophysiology of lymphedema, lymphedema exercise, proper skin care/nutrition, lymphedema precautions, compression bandaging, self massage, long term self-management of lymphedema, home pump use and activity guidelines  Frequency:  1x week  Duration in weeks:  12  Duration in visits:  12  Discussed with:  Patient

## 2024-11-22 ENCOUNTER — PHYSICAL THERAPY (OUTPATIENT)
Dept: PHYSICAL THERAPY | Facility: MEDICAL CENTER | Age: 59
End: 2024-11-22
Attending: NURSE PRACTITIONER
Payer: COMMERCIAL

## 2024-11-22 DIAGNOSIS — R60.0 BILATERAL LOWER EXTREMITY EDEMA: ICD-10-CM

## 2024-11-22 PROCEDURE — 97530 THERAPEUTIC ACTIVITIES: CPT

## 2024-11-22 PROCEDURE — 97140 MANUAL THERAPY 1/> REGIONS: CPT

## 2024-11-22 SDOH — ECONOMIC STABILITY: GENERAL: QUALITY OF LIFE: GOOD

## 2024-11-22 ASSESSMENT — ENCOUNTER SYMPTOMS
PAIN SCALE: 2
ALLEVIATING FACTORS: NOTHING
PAIN TIMING: ALL DAY
EXACERBATED BY: WALKING
EXACERBATED BY: STANDING
PAIN SCALE AT LOWEST: 2
PAIN SCALE AT HIGHEST: 6

## 2024-11-22 NOTE — OP THERAPY DAILY TREATMENT
Outpatient Physical Therapy  LYMPHEDEMA THERAPY DAILY TREATMENT     [facility] Outpatient Physical Therapy  [address]  [place] [address]  Phone:  [phone]  Fax:  [phone]    Date: 2024    Patient: Antonella Frausto  YOB: 1965  MRN: [id number]     Time Calculation                   Chief Complaint: No chief complaint on file.    Visit #: 6    Subjective:   History of Present Illness:     Mechanism of injury:  Patient had her pre-operative appointment and will need to have her surgery at the hospital instead of [facility] due to balance     Quality of life:  Good  Headaches:  no headaches  Sleep disturbance:  Not disrupted  Pain:     Current pain ratin    At best pain ratin    At worst pain ratin    Location:  Heaviness into the limbs bilaterally    Pain timing:  All day    Relieving factors:  Nothing    Aggravating factors:  Walking and standing    Progression:  Unchanged  Social Support:     Lives in:  One-story house    Lives with:  Spouse  Treatments:     None    Patient Goals:     Patient goals for therapy:  Decreased edema and decreased pain      Lymphedema Objective    Visit type  Lipedema and Lipolymphedema    Postural Observation  Seated posture is fair  Standing posture is fair  Correction of posture has no consistent effect      Skin Appearance    moderate edema,  .    Sensation      Left Lower Extremity    Light tough is Impaired    Right Lower Extremity    Light touch is Impaired    Sensation Comments  Hypersensitivity  Left Lower Extremity Circumferential Measurements  Waist: 105.3 cm  Hip: 138.3 cm  Scrotum: cm  Ground/Upper Thigh: 74.9 cm  Mid Thigh: 65.4 cm  Knee: 57.4 cm  Upper Calf: 53.7 cm  Mid Calf: 49.9 cm  Ankle: 37.1 cm  Heel to Foot: 24.1 cm  Total: 606.1 cm    Right Lower Extremity Circumferential Measurements  Waist: 105.3 cm  Hip: 138.3 cm  Scrotum: cm  Ground/Upper Thigh: 73.9 cm  Mid Thigh: 57.6 cm  Knee: 46.5  cm  Upper Calf: 52.1 cm  Mid Calf: 46.1 cm  Ankle: 31.9 cm  Heel to Foot: 23.4 cm  Total: 575.1 cm    Lymphedema Stage  Stage 2 Lymphedema            Therapeutic Treatments and Modalities:     1. Therapeutic Activities (CPT 87667), Measurements and initial re-fitting of bilateral full LE reduction kits    2. Manual Therapy (CPT 92479)    Therapeutic Treatment and Modalities Summary: Short below the knee MLD sequence bilaterally   The purpose of Manual Lymph Drainage (MLD) is to reduce lymph volume in the affected limb by increasing intake of lymphatic load into the lymphatic system, increasing the volume of transported lymph fluid, moving lymph fluid in superficial lymph vessels to collateral lymph collectors, anastomoses, or tissue channels, and increasing venous return. The goal of MLD is to re-route the lymph flow around blocked areas into more centrally located healthy lymph vessels, which drain into the venous system.      Time-based treatments/modalities:           Assessment and Plan:   Assessment details:  Patient continues to show compliance with all conservative treatment measures including: compression, manual lymphatic drainage x 30 minutes daily, therapeutic exericse, skin care, and adherence to a low sodium diet. These measures have been in place since 9/18/24. Despite her adherence to these measures symptoms persist. This is concerning as she is schedule to have a R TKA and swelling could complicate her outcomes. As such continue to recommend integration of a pneumatic compression pump. If you have any questions please feel free to contact our clinic at [phone]  Barriers to therapy:  Comorbidities  Prognosis: good      Goals:   Short Term Goals:  1 - Patient will be able to tolerate wearing reduction kit to bilateral lower extremities for 12/24 hours  2 - Patient will have no less than a 2 cm reduction in limb volume bilaterally   3 - Patient will be independent in HEP targeted for lymphatic  fluid reduction   Short term goal time span:  4-6 weeks    Long Term Goals:  1 - Patient will be independent with maintenance phase management of lymphedema including: compression garments, skin care education, risk reduction strategies, manual lymphatic drainage, and therapeutic exercise  2 - Patient will have no less than a 3 cm reduction in limb volume bilaterally    Long term goal time span:  2-4 months    Plan:  Therapy options:  Physical therapy treatment to continue  Planned therapy interventions:  Addressing equipment needs, compression bandaging, compression stocking, decongestive exercises, home exercise program, intermittent compression, manual lymph drainage, Velcro wraps, referral for compression garment & instructions for don/doffing, patient education and range of motion exercises  Planned education:  Functional anatomy and physiology of the lymphatic system, pathophysiology of lymphedema, lymphedema exercise, proper skin care/nutrition, lymphedema precautions, compression bandaging, self massage, long term self-management of lymphedema, home pump use and activity guidelines  Frequency:  1x week  Duration in weeks:  12  Duration in visits:  12  Discussed with:  Patient

## 2024-11-27 ENCOUNTER — PHYSICAL THERAPY (OUTPATIENT)
Dept: PHYSICAL THERAPY | Facility: MEDICAL CENTER | Age: 59
End: 2024-11-27
Attending: NURSE PRACTITIONER
Payer: COMMERCIAL

## 2024-11-27 DIAGNOSIS — R60.0 BILATERAL LOWER EXTREMITY EDEMA: ICD-10-CM

## 2024-11-27 PROCEDURE — 97140 MANUAL THERAPY 1/> REGIONS: CPT

## 2024-11-27 SDOH — ECONOMIC STABILITY: GENERAL: QUALITY OF LIFE: GOOD

## 2024-11-27 ASSESSMENT — ENCOUNTER SYMPTOMS
PAIN SCALE AT HIGHEST: 6
PAIN SCALE: 2
EXACERBATED BY: STANDING
PAIN SCALE AT LOWEST: 2
PAIN TIMING: ALL DAY
ALLEVIATING FACTORS: NOTHING
EXACERBATED BY: WALKING

## 2024-11-27 NOTE — OP THERAPY PROGRESS SUMMARY
Outpatient Physical Therapy  LYMPHEDEMA THERAPY PROGRESS SUMMARY NOTE    [facility] Outpatient Physical Therapy  [address]  [place] [address]  Phone:  [phone]  Fax:  [phone]    Date of Visit: 2024    Patient: Antonella Frausto  YOB: 1965  MRN: [id number]     Referring Provider: KHOI Nieto  [address] [place] [address]   Referring Diagnosis Localized edema [R60.0]     Visit #: Visit count could not be calculated. Make sure you are using a visit which is associated with an episode.    Progress Report Period: 24-24    Time Calculation    Start time: 1117  Stop time: 1200 Time Calculation (min): 43 minutes         Chief Complaint: Lymphedema Aquired    Visit Diagnoses     ICD-10-CM   1. Bilateral lower extremity edema  R60.0       Subjective:   History of Present Illness:     Mechanism of injury:  Patient is both nervous and excited about her TKA, she continues to do well with her reduction   Quality of life:  Good  Headaches:  no headaches  Sleep disturbance:  Not disrupted  Pain:     Current pain ratin    At best pain ratin    At worst pain ratin    Location:  Heaviness into the limbs bilaterally    Pain timing:  All day    Relieving factors:  Nothing    Aggravating factors:  Walking and standing    Progression:  Unchanged  Social Support:     Lives in:  One-story house    Lives with:  Spouse  Treatments:     None    Patient Goals:     Patient goals for therapy:  Decreased edema and decreased pain      Lymphedema Objective    Visit type  Lipedema and Lipolymphedema    Postural Observation  Seated posture is fair  Standing posture is fair  Correction of posture has no consistent effect      Skin Appearance    moderate edema,  .    Sensation      Left Lower Extremity    Light tough is Impaired    Right Lower Extremity    Light touch is Impaired    Sensation Comments  Hypersensitivity  Left Lower  Extremity Circumferential Measurements  Waist: 105.3 cm  Hip: 138.3 cm  Scrotum: cm  Ground/Upper Thigh: 74.9 cm  Mid Thigh: 65.4 cm  Knee: 57.4 cm  Upper Calf: 53.7 cm  Mid Calf: 49.9 cm  Ankle: 37.1 cm  Heel to Foot: 24.1 cm  Total: 606.1 cm    Right Lower Extremity Circumferential Measurements  Waist: 105.3 cm  Hip: 138.3 cm  Scrotum: cm  Ground/Upper Thigh: 73.9 cm  Mid Thigh: 57.6 cm  Knee: 46.5 cm  Upper Calf: 52.1 cm  Mid Calf: 46.1 cm  Ankle: 31.9 cm  Heel to Foot: 23.4 cm  Total: 575.1 cm    Lymphedema Stage  Stage 2 Lymphedema    Other Notes  L ankle - 31.5 cm  L Calf 55.1 cm  Left knee 50.3 cm  Length -49.4 cm  Left lower thigh - 56.4 cm  Left upper thigh - 57.8 cm  R ankle - 30.7 cm  R Calf 53.2 cm  R  knee 51.9 cm  Length - 34.1 cm  Right lower thigh - 57.6 cm  Right upper thigh - 62.1 cm        Therapeutic Treatments and Modalities:     1. Manual Therapy (CPT 70836)    Therapeutic Treatment and Modalities Summary: Short below the knee MLD sequence bilaterally   The purpose of Manual Lymph Drainage (MLD) is to reduce lymph volume in the affected limb by increasing intake of lymphatic load into the lymphatic system, increasing the volume of transported lymph fluid, moving lymph fluid in superficial lymph vessels to collateral lymph collectors, anastomoses, or tissue channels, and increasing venous return. The goal of MLD is to re-route the lymph flow around blocked areas into more centrally located healthy lymph vessels, which drain into the venous system.        Time-based treatments/modalities:    Physical Therapy Timed Treatment Charges  Manual therapy minutes (CPT 49737): 43 minutes      Assessment and Plan:   Assessment details:  Thank you for referring Antonella to physical therapy. She has attended 7 sessions with 0 cancellations/no shows since evaluation date on 9/18/24. Treatment has consisted of patient education with regards to the lymphatic system, manual lymphatic drainage, compression  bandaging, manual therapies to increase soft tissue and joint restriction, compression garments, therapeutic exercise to improve musculoskeletal strength and mobility, and lymphedema risk reduction strategies. She has made good progress with treatment. She is able to don and doff her reduction kits and has seen a decrease in limb volume bilaterally. She remains limited with active ROM and pain into the right knee, but this will be addressed with her upcoming TKA. We are also waiting on insurance approval for her pneumatic compression pump. I will place an order for her maintenance based garments today. Patient would continue to benefit from skilled physical therapy intervention to address the above mentioned impairments and prevent further functional decline. If you have any questions involving this plan of care please feel free to contact our clinic at [phone]. Thank you for the opportunity to work with Antonella.     Barriers to therapy:  Comorbidities  Prognosis: good      Goals:   Short Term Goals:  1 - Patient will be able to tolerate wearing reduction kit to bilateral lower extremities for 12/24 hours (GOAL MET)  2 - Patient will have no less than a 2 cm reduction in limb volume bilaterally (GOAL MET)  3 - Patient will be independent in HEP targeted for lymphatic fluid reduction (GOAL MET)  Short term goal time span:  4-6 weeks    Long Term Goals:  1 - Patient will be independent with maintenance phase management of lymphedema including: compression garments, skin care education, risk reduction strategies, manual lymphatic drainage, and therapeutic exercise (GOAL IN PROGRESS)  2 - Patient will have no less than a 3 cm reduction in limb volume bilaterally (GOAL MET)    Long term goal time span:  2-4 months    Plan:  Therapy options:  Physical therapy treatment to continue  Planned therapy interventions:  Addressing equipment needs, compression bandaging, compression stocking, decongestive exercises, home  exercise program, intermittent compression, manual lymph drainage, Velcro wraps, referral for compression garment & instructions for don/doffing, patient education and range of motion exercises  Planned education:  Functional anatomy and physiology of the lymphatic system, pathophysiology of lymphedema, lymphedema exercise, proper skin care/nutrition, lymphedema precautions, compression bandaging, self massage, long term self-management of lymphedema, home pump use and activity guidelines  Frequency:  1x week  Duration in weeks:  12  Duration in visits:  12  Discussed with:  Patient      Functional Assessment Used        Referring provider co-signature:  I have reviewed this plan of care and my co-signature certifies the need for services.    Certification Period: 11/27/2024 to 01/26/25    Physician Signature: ________________________________ Date: ______________

## 2024-11-27 NOTE — OP THERAPY DAILY TREATMENT
Outpatient Physical Therapy  LYMPHEDEMA THERAPY DAILY TREATMENT     [facility] Outpatient Physical Therapy  [address]  [place] [address]  Phone:  [phone]  Fax:  [phone]    Date: 11/27/2024    Patient: Antonella Frausto  YOB: 1965  MRN: [id number]     Time Calculation    Start time: 1117  Stop time: 1200 Time Calculation (min): 43 minutes         Chief Complaint: Lymphedema Aquired    Visit #: 7    Subjective:   History of Present Illness:     Mechanism of injury:  SEE PN       Lymphedema Objective      Exercises/Treatment  Time-based treatments/modalities:    Physical Therapy Timed Treatment Charges  Manual therapy minutes (CPT 36713): 43 minutes      Assessment and Plan:   Assessment details:  SEE PN

## 2024-12-03 ENCOUNTER — OFFICE VISIT (OUTPATIENT)
Dept: MEDICAL GROUP | Facility: PHYSICIAN GROUP | Age: 59
End: 2024-12-03
Payer: COMMERCIAL

## 2024-12-03 VITALS
TEMPERATURE: 97.3 F | DIASTOLIC BLOOD PRESSURE: 80 MMHG | WEIGHT: 241.2 LBS | SYSTOLIC BLOOD PRESSURE: 130 MMHG | OXYGEN SATURATION: 98 % | HEART RATE: 58 BPM | HEIGHT: 64 IN | BODY MASS INDEX: 41.18 KG/M2

## 2024-12-03 DIAGNOSIS — R60.9 LIPEDEMA: ICD-10-CM

## 2024-12-03 DIAGNOSIS — Z02.89 ENCOUNTER FOR COMPLETION OF FORM WITH PATIENT: ICD-10-CM

## 2024-12-03 DIAGNOSIS — M17.11 PRIMARY OSTEOARTHRITIS OF RIGHT KNEE: ICD-10-CM

## 2024-12-03 DIAGNOSIS — Z23 NEED FOR VACCINATION: ICD-10-CM

## 2024-12-03 PROCEDURE — 90471 IMMUNIZATION ADMIN: CPT | Performed by: NURSE PRACTITIONER

## 2024-12-03 PROCEDURE — 3075F SYST BP GE 130 - 139MM HG: CPT | Performed by: NURSE PRACTITIONER

## 2024-12-03 PROCEDURE — 90656 IIV3 VACC NO PRSV 0.5 ML IM: CPT | Performed by: NURSE PRACTITIONER

## 2024-12-03 PROCEDURE — 99213 OFFICE O/P EST LOW 20 MIN: CPT | Mod: 25 | Performed by: NURSE PRACTITIONER

## 2024-12-03 PROCEDURE — 3079F DIAST BP 80-89 MM HG: CPT | Performed by: NURSE PRACTITIONER

## 2024-12-03 ASSESSMENT — ENCOUNTER SYMPTOMS
GASTROINTESTINAL NEGATIVE: 1
ENDOCRINE NEGATIVE: 1
ALLERGIC/IMMUNOLOGIC NEGATIVE: 1
ROS SKIN COMMENTS: PURPURA
BRUISES/BLEEDS EASILY: 1
RESPIRATORY NEGATIVE: 1
CONSTITUTIONAL NEGATIVE: 1
ARTHRALGIAS: 1
PSYCHIATRIC NEGATIVE: 1
EYES NEGATIVE: 1

## 2024-12-03 ASSESSMENT — FIBROSIS 4 INDEX: FIB4 SCORE: 0.81

## 2024-12-03 NOTE — PROGRESS NOTES
"Verbal consent was acquired by the patient to use [employer] ambient listening note generation during this visit Yes      Subjective   Antonella Frausto is a 59 y.o. female who presents for:  History of Present Illness  The patient is a 59-year-old female who presents for evaluation of bruising and requesting [facility] handicap placard paperwork.    She noticed bruising on her arm after scratching an itch, which she believes may be a side effect of meloxicam. Despite discontinuing meloxicam a week ago, the bruising persists. She has been applying arnica gel to the bruises. She is concerned about this as she is scheduled for right knee replacement surgery in January 2025.    She has been taking meloxicam since April 2024 for arthritis in her knee. She uses a cane for balance during long walks and occasionally needs to rest after walking 200 feet. She has not experienced any adverse effects with Advil.    She also suffers from lipedema in her legs,  she has sought specialist therapy for lymphedema and was told she has lipedema. She wraps her legs in compressive garments, but understands there isn't much in the way of treatment..    She has stopped taking montelukast due to experiencing unusual dreams and sleep disturbances.    Review of Systems   Constitutional: Negative.    HENT: Negative.     Eyes: Negative.    Respiratory: Negative.     Cardiovascular:  Positive for leg swelling.        Lipedema   Gastrointestinal: Negative.    Endocrine: Negative.    Genitourinary: Negative.    Musculoskeletal:  Positive for arthralgias and gait problem.   Skin:  Positive for rash.        purpura   Allergic/Immunologic: Negative.    Hematological:  Bruises/bleeds easily.   Psychiatric/Behavioral: Negative.       Objective   /80 (BP Location: Left arm, Patient Position: Sitting, BP Cuff Size: Adult)   Pulse (!) 58   Temp 36.3 °C (97.3 °F) (Temporal)   Ht 1.626 m (5' 4\")   Wt 109 kg (241 lb 3.2 oz)   SpO2 98%   Physical " Exam  General: Well nourished, well developed female in NAD, awake and conversant.  Eyes: Normal conjunctiva, anicteric.  Round symmetrical pupils.  ENT: Hearing grossly intact.  No nasal discharge.  Neck: Neck is supple.  No masses or thyromegaly.  CV: No lower extremity edema.  Respiratory: Respirations are nonlabored.  No wheezing.  Abdomen: Non-Distended.  Skin: Warm.  No rashes or ulcers. Purpura to bilateral arms. See phota  MSK: Normal ambulation.  No clubbing or cyanosis.  Neuro: Sensation and CN II-XII grossly normal.  Psych: Alert and oriented.  Cooperative, appropriate mood and affect, normal judgment.      Assessment & Plan  1. Encounter for completion of form with patient  [facility] handicap placard paperwork completed due to the patient's inability to walk 200 feet without needing to rest and severe osteoarthritis in bilateral knees. Original paperwork returned to patient.    2. Primary osteoarthritis of right knee  Chronic, ongoing. The bruising is likely a side effect of meloxicam, which should resolve over time. She was advised to discontinue meloxicam. She has been using arnica gel for the bruising. Celebrex was discussed as a potential alternative for arthritis management, but she opted to return to using Advil and Tylenol as they do not cause bruising. Tentatively scheduled to right knee replacement with [facility] at [facility] in January 2025.    3. Lipedema  New to examiner. She has been diagnosed with lipedema in her legs. She was informed that liposuction is an option, but it is not covered by her insurance and the condition may recur. She is currently using wraps and pants for management, which are helping.    4. Need for vaccination  Given today.  - INFLUENZA VACCINE TRI INJ (PF)      Return in about 7 months (around 7/17/2025) for Preventative Annual, Follow up Labs.     The patient verbalized permission and consent to allow picture to be taken through [employer] Mobile Application to be placed in Chart  for documentation. It was explained that this picture is not saved on any personal files or albums on my mobile device. Patient verbalized understanding, permission, and agreement.       I have placed the below orders and discussed them with an approved delegating provider. The MA is performing the below orders under the direction of Dr. Sutherland.      Please note that this dictation was created using voice recognition software. I have made every reasonable attempt to correct obvious errors, but I expect that there are errors of grammar and possibly content that I did not discover before finalizing the note.

## 2024-12-04 ENCOUNTER — PHYSICAL THERAPY (OUTPATIENT)
Dept: PHYSICAL THERAPY | Facility: MEDICAL CENTER | Age: 59
End: 2024-12-04
Attending: NURSE PRACTITIONER
Payer: COMMERCIAL

## 2024-12-04 DIAGNOSIS — R60.0 BILATERAL LOWER EXTREMITY EDEMA: ICD-10-CM

## 2024-12-04 PROCEDURE — 97530 THERAPEUTIC ACTIVITIES: CPT

## 2024-12-04 PROCEDURE — 97140 MANUAL THERAPY 1/> REGIONS: CPT

## 2024-12-04 SDOH — ECONOMIC STABILITY: GENERAL: QUALITY OF LIFE: GOOD

## 2024-12-04 ASSESSMENT — ENCOUNTER SYMPTOMS
ALLEVIATING FACTORS: NOTHING
PAIN SCALE AT HIGHEST: 6
PAIN SCALE: 2
EXACERBATED BY: STANDING
PAIN TIMING: ALL DAY
EXACERBATED BY: WALKING
PAIN SCALE AT LOWEST: 2

## 2024-12-04 NOTE — OP THERAPY DAILY TREATMENT
Outpatient Physical Therapy  LYMPHEDEMA THERAPY DAILY TREATMENT     [facility]  [address]  [place] [address]  Phone:  [phone]  Fax:  [phone]    Date: 2024    Patient: Antonella Frausto  YOB: 1965  MRN: [id number]     Time Calculation    Start time: 1030  Stop time: 1111 Time Calculation (min): 41 minutes         Chief Complaint: Lymphedema Aquired    Visit #: 8    Subjective:   History of Present Illness:     Mechanism of injury:  Patient still has not been called regarding scheduling her TKA. She is concerned about her follow up care with this clinic one her procedure takes place in January   Quality of life:  Good  Headaches:  no headaches  Sleep disturbance:  Not disrupted  Pain:     Current pain ratin    At best pain ratin    At worst pain ratin    Location:  Heaviness into the limbs bilaterally    Pain timing:  All day    Relieving factors:  Nothing    Aggravating factors:  Walking and standing    Progression:  Unchanged  Social Support:     Lives in:  One-story house    Lives with:  Spouse  Treatments:     None    Patient Goals:     Patient goals for therapy:  Decreased edema and decreased pain      Lymphedema Objective    Visit type  Lipedema and Lipolymphedema    Postural Observation  Seated posture is fair  Standing posture is fair  Correction of posture has no consistent effect      Skin Appearance    moderate edema,  .    Sensation      Left Lower Extremity    Light tough is Impaired    Right Lower Extremity    Light touch is Impaired    Sensation Comments  Hypersensitivity  Left Lower Extremity Circumferential Measurements  Waist: 105.3 cm  Hip: 138.3 cm  Scrotum: cm  Ground/Upper Thigh: 74.9 cm  Mid Thigh: 65.4 cm  Knee: 57.4 cm  Upper Calf: 53.7 cm  Mid Calf: 49.9 cm  Ankle: 37.1 cm  Heel to Foot: 24.1 cm  Total: 606.1 cm    Right Lower Extremity Circumferential Measurements  Waist:  105.3 cm  Hip: 138.3 cm  Scrotum: cm  Ground/Upper Thigh: 73.9 cm  Mid Thigh: 57.6 cm  Knee: 46.7 cm  Upper Calf: 52.1 cm  Mid Calf: 46.1 cm  Ankle: 31.9 cm  Heel to Foot: 23.4 cm  Total: 575.3 cm    Lymphedema Stage  Stage 2 Lymphedema    Other Notes  L ankle - 31.5 cm  L Calf 55.1 cm  Left knee 50.3 cm  Length -49.4 cm  Left lower thigh - 56.4 cm  Left upper thigh - 57.8 cm  R ankle - 30.7 cm  R Calf 53.2 cm  R  knee 51.9 cm  Length - 34.1 cm  Right lower thigh - 57.6 cm  Right upper thigh - 62.1 cm          Therapeutic Treatments and Modalities:     1. Therapeutic Activities (CPT 62536), Re-fitting of bilateral reduction kits    2. Manual Therapy (CPT 16963)    Therapeutic Treatment and Modalities Summary: Short below the knee MLD sequence bilaterally   The purpose of Manual Lymph Drainage (MLD) is to reduce lymph volume in the affected limb by increasing intake of lymphatic load into the lymphatic system, increasing the volume of transported lymph fluid, moving lymph fluid in superficial lymph vessels to collateral lymph collectors, anastomoses, or tissue channels, and increasing venous return. The goal of MLD is to re-route the lymph flow around blocked areas into more centrally located healthy lymph vessels, which drain into the venous system.      Time-based treatments/modalities:    Physical Therapy Timed Treatment Charges  Manual therapy minutes (CPT 02510): 32 minutes  Therapeutic activity minutes (CPT 49575): 9 minutes      Assessment and Plan:   Assessment details:  Patients maintenance garments have been ordered and we are still waiting to hear back on her pneumatic compression pump. We will work [facility] scheduling to ensure that appropriate post op follow up is scheduled. Will see back in clinic next week   Barriers to therapy:  Comorbidities  Prognosis: good      Goals:   Short Term Goals:  1 - Patient will be able to tolerate wearing reduction kit to bilateral lower extremities for 12/24 hours (GOAL  MET)  2 - Patient will have no less than a 2 cm reduction in limb volume bilaterally (GOAL MET)  3 - Patient will be independent in HEP targeted for lymphatic fluid reduction (GOAL MET)  Short term goal time span:  4-6 weeks    Long Term Goals:  1 - Patient will be independent with maintenance phase management of lymphedema including: compression garments, skin care education, risk reduction strategies, manual lymphatic drainage, and therapeutic exercise (GOAL IN PROGRESS)  2 - Patient will have no less than a 3 cm reduction in limb volume bilaterally (GOAL MET)    Long term goal time span:  2-4 months    Plan:  Therapy options:  Physical therapy treatment to continue  Planned therapy interventions:  Addressing equipment needs, compression bandaging, compression stocking, decongestive exercises, home exercise program, intermittent compression, manual lymph drainage, Velcro wraps, referral for compression garment & instructions for don/doffing, patient education and range of motion exercises  Planned education:  Functional anatomy and physiology of the lymphatic system, pathophysiology of lymphedema, lymphedema exercise, proper skin care/nutrition, lymphedema precautions, compression bandaging, self massage, long term self-management of lymphedema, home pump use and activity guidelines  Frequency:  1x week  Duration in weeks:  12  Duration in visits:  12  Discussed with:  Patient

## 2024-12-06 PROBLEM — M17.11 PRIMARY OSTEOARTHRITIS OF RIGHT KNEE: Status: ACTIVE | Noted: 2024-10-14

## 2024-12-11 ENCOUNTER — PHYSICAL THERAPY (OUTPATIENT)
Dept: PHYSICAL THERAPY | Facility: MEDICAL CENTER | Age: 59
End: 2024-12-11
Attending: NURSE PRACTITIONER
Payer: COMMERCIAL

## 2024-12-11 DIAGNOSIS — R60.0 BILATERAL LOWER EXTREMITY EDEMA: ICD-10-CM

## 2024-12-11 PROCEDURE — 97140 MANUAL THERAPY 1/> REGIONS: CPT

## 2024-12-11 SDOH — ECONOMIC STABILITY: GENERAL: QUALITY OF LIFE: GOOD

## 2024-12-11 ASSESSMENT — ENCOUNTER SYMPTOMS
PAIN SCALE: 2
PAIN SCALE AT HIGHEST: 6
EXACERBATED BY: WALKING
PAIN SCALE AT LOWEST: 2
ALLEVIATING FACTORS: NOTHING
EXACERBATED BY: STANDING
PAIN TIMING: ALL DAY

## 2024-12-11 NOTE — OP THERAPY DAILY TREATMENT
Outpatient Physical Therapy  LYMPHEDEMA THERAPY DAILY TREATMENT     [facility]  [address]  [place] [address]  Phone:  [phone]  Fax:  [phone]    Date: 2024    Patient: Antonella Frausto  YOB: 1965  MRN: [id number]     Time Calculation                   Chief Complaint: No chief complaint on file.    Visit #: 9    Subjective:   History of Present Illness:     Mechanism of injury:  Patients surgery has been scheduled for 25. She was able to go to the pool and felt good moving in the water. She has not received a call from [employer] regarding her pump  Quality of life:  Good  Headaches:  no headaches  Sleep disturbance:  Not disrupted  Pain:     Current pain ratin    At best pain ratin    At worst pain ratin    Location:  Heaviness into the limbs bilaterally    Pain timing:  All day    Relieving factors:  Nothing    Aggravating factors:  Walking and standing    Progression:  Unchanged  Social Support:     Lives in:  One-story house    Lives with:  Spouse  Treatments:     None    Patient Goals:     Patient goals for therapy:  Decreased edema and decreased pain      Lymphedema Objective    Visit type  Lipedema and Lipolymphedema    Postural Observation  Seated posture is fair  Standing posture is fair  Correction of posture has no consistent effect      Skin Appearance    moderate edema,  .    Sensation      Left Lower Extremity    Light tough is Impaired    Right Lower Extremity    Light touch is Impaired    Sensation Comments  Hypersensitivity  Left Lower Extremity Circumferential Measurements  Waist: 105.3 cm  Hip: 138.3 cm  Scrotum: cm  Ground/Upper Thigh: 74.9 cm  Mid Thigh: 65.4 cm  Knee: 57.4 cm  Upper Calf: 53.7 cm  Mid Calf: 49.9 cm  Ankle: 37.1 cm  Heel to Foot: 24.1 cm  Total: 606.1 cm    Right Lower Extremity Circumferential Measurements  Waist: 105.3 cm  Hip: 138.3 cm  Scrotum:  cm  Ground/Upper Thigh: 73.9 cm  Mid Thigh: 57.6 cm  Knee: 46.7 cm  Upper Calf: 52.1 cm  Mid Calf: 46.1 cm  Ankle: 31.9 cm  Heel to Foot: 23.4 cm  Total: 575.3 cm    Lymphedema Stage  Stage 2 Lymphedema    Other Notes  L ankle - 31.5 cm  L Calf 55.1 cm  Left knee 50.3 cm  Length -49.4 cm  Left lower thigh - 56.4 cm  Left upper thigh - 57.8 cm  R ankle - 30.7 cm  R Calf 53.2 cm  R  knee 51.9 cm  Length - 34.1 cm  Right lower thigh - 57.6 cm  Right upper thigh - 62.1 cm          Therapeutic Treatments and Modalities:     2. Manual Therapy (CPT 84755)    Therapeutic Treatment and Modalities Summary: Short below the knee MLD sequence bilaterally   The purpose of Manual Lymph Drainage (MLD) is to reduce lymph volume in the affected limb by increasing intake of lymphatic load into the lymphatic system, increasing the volume of transported lymph fluid, moving lymph fluid in superficial lymph vessels to collateral lymph collectors, anastomoses, or tissue channels, and increasing venous return. The goal of MLD is to re-route the lymph flow around blocked areas into more centrally located healthy lymph vessels, which drain into the venous system.      Time-based treatments/modalities:           Assessment and Plan:   Assessment details:  Issued contact information for [facility] in order to set up pump demo. Would like to see her have this device prior to her procedure. Maintenance garments should arrive this week. Will teach how to don and doff when then do   Barriers to therapy:  Comorbidities  Prognosis: good      Goals:   Short Term Goals:  1 - Patient will be able to tolerate wearing reduction kit to bilateral lower extremities for 12/24 hours (GOAL MET)  2 - Patient will have no less than a 2 cm reduction in limb volume bilaterally (GOAL MET)  3 - Patient will be independent in HEP targeted for lymphatic fluid reduction (GOAL MET)  Short term goal time span:  4-6 weeks    Long Term Goals:  1 - Patient will be  independent with maintenance phase management of lymphedema including: compression garments, skin care education, risk reduction strategies, manual lymphatic drainage, and therapeutic exercise (GOAL IN PROGRESS)  2 - Patient will have no less than a 3 cm reduction in limb volume bilaterally (GOAL MET)    Long term goal time span:  2-4 months    Plan:  Therapy options:  Physical therapy treatment to continue  Planned therapy interventions:  Addressing equipment needs, compression bandaging, compression stocking, decongestive exercises, home exercise program, intermittent compression, manual lymph drainage, Velcro wraps, referral for compression garment & instructions for don/doffing, patient education and range of motion exercises  Planned education:  Functional anatomy and physiology of the lymphatic system, pathophysiology of lymphedema, lymphedema exercise, proper skin care/nutrition, lymphedema precautions, compression bandaging, self massage, long term self-management of lymphedema, home pump use and activity guidelines  Frequency:  1x week  Duration in weeks:  12  Duration in visits:  12  Discussed with:  Patient

## 2024-12-18 ENCOUNTER — APPOINTMENT (OUTPATIENT)
Dept: PHYSICAL THERAPY | Facility: MEDICAL CENTER | Age: 59
End: 2024-12-18
Attending: NURSE PRACTITIONER
Payer: COMMERCIAL

## 2024-12-19 ENCOUNTER — PHYSICAL THERAPY (OUTPATIENT)
Dept: PHYSICAL THERAPY | Facility: MEDICAL CENTER | Age: 59
End: 2024-12-19
Attending: NURSE PRACTITIONER
Payer: COMMERCIAL

## 2024-12-19 DIAGNOSIS — R60.0 BILATERAL LOWER EXTREMITY EDEMA: ICD-10-CM

## 2024-12-19 PROCEDURE — 97140 MANUAL THERAPY 1/> REGIONS: CPT

## 2024-12-19 SDOH — ECONOMIC STABILITY: GENERAL: QUALITY OF LIFE: GOOD

## 2024-12-19 ASSESSMENT — ENCOUNTER SYMPTOMS
EXACERBATED BY: WALKING
ALLEVIATING FACTORS: NOTHING
EXACERBATED BY: STANDING
PAIN SCALE AT LOWEST: 2
PAIN SCALE AT HIGHEST: 6
PAIN SCALE: 2
PAIN TIMING: ALL DAY

## 2024-12-19 NOTE — OP THERAPY DAILY TREATMENT
Outpatient Physical Therapy  LYMPHEDEMA THERAPY DAILY TREATMENT     [facility]  [address]  [place] [address]  Phone:  [phone]  Fax:  [phone]    Date: 2024    Patient: Antonella Frausto  YOB: 1965  MRN: [id number]     Time Calculation    Start time: 0816  Stop time: 0854 Time Calculation (min): 38 minutes         Chief Complaint: Lymphedema Aquired    Visit #: 10    Subjective:   History of Present Illness:     Mechanism of injury:  Patient will have her pump demo on . She still has not heard from [employer] regarding her maintenance garments  Quality of life:  Good  Headaches:  no headaches  Sleep disturbance:  Not disrupted  Pain:     Current pain ratin    At best pain ratin    At worst pain ratin    Location:  Heaviness into the limbs bilaterally    Pain timing:  All day    Relieving factors:  Nothing    Aggravating factors:  Walking and standing    Progression:  Unchanged  Social Support:     Lives in:  One-story house    Lives with:  Spouse  Treatments:     None    Patient Goals:     Patient goals for therapy:  Decreased edema and decreased pain      Lymphedema Objective      Other Notes  Slight increase in tenderness in the knees per patient          Therapeutic Treatments and Modalities:     2. Manual Therapy (CPT 48299)    Therapeutic Treatment and Modalities Summary: Short neck sequence, abdominal sequence, establishment of inguinla to inguinal anastamoses, establishment of inguinal to axillary anastamoses, decongestion of the bilateral lower extremities from proximal to distal with an emphasis on medial to lateral drainage    The purpose of Manual Lymph Drainage (MLD) is to reduce lymph volume in the affected limb by increasing intake of lymphatic load into the lymphatic system, increasing the volume of transported lymph fluid, moving lymph fluid in superficial lymph vessels to collateral  lymph collectors, anastomoses, or tissue channels, and increasing venous return. The goal of MLD is to re-route the lymph flow around blocked areas into more centrally located healthy lymph vessels, which drain into the venous system.      Time-based treatments/modalities:    Physical Therapy Timed Treatment Charges  Manual therapy minutes (CPT 31462): 38 minutes      Assessment and Plan:   Assessment details:  Patient will have her demo on 12/24/24. Her surgery in scheduled for mid January and I would like to have this in place prior to her procedure. Will reach out to rep to ensure that this will be carried out for patient  Barriers to therapy:  Comorbidities  Prognosis: good      Goals:   Short Term Goals:  1 - Patient will be able to tolerate wearing reduction kit to bilateral lower extremities for 12/24 hours (GOAL MET)  2 - Patient will have no less than a 2 cm reduction in limb volume bilaterally (GOAL MET)  3 - Patient will be independent in HEP targeted for lymphatic fluid reduction (GOAL MET)  Short term goal time span:  4-6 weeks    Long Term Goals:  1 - Patient will be independent with maintenance phase management of lymphedema including: compression garments, skin care education, risk reduction strategies, manual lymphatic drainage, and therapeutic exercise (GOAL IN PROGRESS)  2 - Patient will have no less than a 3 cm reduction in limb volume bilaterally (GOAL MET)    Long term goal time span:  2-4 months    Plan:  Therapy options:  Physical therapy treatment to continue  Planned therapy interventions:  Addressing equipment needs, compression bandaging, compression stocking, decongestive exercises, home exercise program, intermittent compression, manual lymph drainage, Velcro wraps, referral for compression garment & instructions for don/doffing, patient education and range of motion exercises  Planned education:  Functional anatomy and physiology of the lymphatic system, pathophysiology of lymphedema,  lymphedema exercise, proper skin care/nutrition, lymphedema precautions, compression bandaging, self massage, long term self-management of lymphedema, home pump use and activity guidelines  Frequency:  1x week  Duration in weeks:  12  Duration in visits:  12  Discussed with:  Patient

## 2025-01-08 ENCOUNTER — PHYSICAL THERAPY (OUTPATIENT)
Dept: PHYSICAL THERAPY | Facility: MEDICAL CENTER | Age: 60
End: 2025-01-08
Attending: NURSE PRACTITIONER
Payer: COMMERCIAL

## 2025-01-08 ENCOUNTER — APPOINTMENT (OUTPATIENT)
Dept: ADMISSIONS | Facility: MEDICAL CENTER | Age: 60
End: 2025-01-08
Attending: ORTHOPAEDIC SURGERY
Payer: COMMERCIAL

## 2025-01-08 DIAGNOSIS — R60.0 BILATERAL LOWER EXTREMITY EDEMA: ICD-10-CM

## 2025-01-08 PROCEDURE — 97140 MANUAL THERAPY 1/> REGIONS: CPT

## 2025-01-08 SDOH — ECONOMIC STABILITY: GENERAL: QUALITY OF LIFE: GOOD

## 2025-01-08 ASSESSMENT — ENCOUNTER SYMPTOMS
EXACERBATED BY: WALKING
PAIN SCALE: 2
PAIN TIMING: ALL DAY
EXACERBATED BY: STANDING
PAIN SCALE AT HIGHEST: 6
PAIN SCALE AT LOWEST: 2
ALLEVIATING FACTORS: NOTHING

## 2025-01-08 NOTE — OP THERAPY DAILY TREATMENT
Outpatient Physical Therapy  LYMPHEDEMA THERAPY DAILY TREATMENT     [facility] Outpatient Physical Therapy  [address]  [place] [address]  Phone:  [phone]  Fax:  [phone]    Date: 2025    Patient: Antonella Frausto  YOB: 1965  MRN: [id number]     Time Calculation    Start time: 0945  Stop time: 1024 Time Calculation (min): 39 minutes         Chief Complaint: Lymphedema Aquired    Visit #: 11    Subjective:   History of Present Illness:     Mechanism of injury:  Patients maintenance garments have arrived and she is having some difficulty keeping the thigh up and in place. She had a 1x basic as well as FlexiTouch demo on  and is hoping to move forward with her prior to surgery on 25   Quality of life:  Good  Headaches:  no headaches  Sleep disturbance:  Not disrupted  Pain:     Current pain ratin    At best pain ratin    At worst pain ratin    Location:  Heaviness into the limbs bilaterally    Pain timing:  All day    Relieving factors:  Nothing    Aggravating factors:  Walking and standing    Progression:  Unchanged  Social Support:     Lives in:  One-story house    Lives with:  Spouse  Treatments:     None    Patient Goals:     Patient goals for therapy:  Decreased edema and decreased pain      Lymphedema Objective    Visit type  Lipedema and Lipolymphedema    Postural Observation  Seated posture is fair  Standing posture is fair  Correction of posture has no consistent effect      Skin Appearance    moderate edema,  .    Sensation      Left Lower Extremity    Light tough is Impaired    Right Lower Extremity    Light touch is Impaired    Sensation Comments  Hypersensitivity  Left Lower Extremity Circumferential Measurements  Waist: 107.1 cm  Hip: 139.4 cm  Scrotum: cm  Ground/Upper Thigh: 74.9 cm  Mid Thigh: 65.4 cm  Knee: 57.4 cm  Upper Calf: 53.7 cm  Mid Calf: 49.9 cm  Ankle: 32.4 cm  Heel to Foot: 24.4 cm  Total:  604.6 cm    Right Lower Extremity Circumferential Measurements  Waist: 107.1 cm  Hip: 139.4 cm  Scrotum: cm  Ground/Upper Thigh: 73.9 cm  Mid Thigh: 55.4 cm  Knee: 46.5 cm  Upper Calf: 54.5 cm  Mid Calf: 48.6 cm  Ankle: 32.4 cm  Heel to Foot: 24.4 cm  Total: 582.2 cm    Lymphedema Stage  Stage 2 Lymphedema    Other Notes  L ankle - 31.5 cm  L Calf 55.1 cm  Left knee 50.3 cm  Length -49.4 cm  Left lower thigh - 56.4 cm  Left upper thigh - 57.8 cm  R ankle - 30.7 cm  R Calf 53.2 cm  R  knee 51.9 cm  Length - 34.1 cm  Right lower thigh - 57.6 cm  Right upper thigh - 62.1 cm          Therapeutic Treatments and Modalities:     2. Manual Therapy (CPT 84175)    Therapeutic Treatment and Modalities Summary: Short neck sequence, abdominal sequence, establishment of inguinla to inguinal anastamoses, establishment of inguinal to axillary anastamoses, decongestion of the bilateral lower extremities from proximal to distal with an emphasis on medial to lateral drainage    The purpose of Manual Lymph Drainage (MLD) is to reduce lymph volume in the affected limb by increasing intake of lymphatic load into the lymphatic system, increasing the volume of transported lymph fluid, moving lymph fluid in superficial lymph vessels to collateral lymph collectors, anastomoses, or tissue channels, and increasing venous return. The goal of MLD is to re-route the lymph flow around blocked areas into more centrally located healthy lymph vessels, which drain into the venous system.      Time-based treatments/modalities:    Physical Therapy Timed Treatment Charges  Manual therapy minutes (CPT 05560): 39 minutes      Assessment and Plan:   Assessment details:  Patient's maintenance garments are well fitting in all areas accept her thigh. I will reach out to [facility] to initiate an exchange of size. Antonella Frausto has been compliant with conservative therapy measures including: bilateral in-elastic compression garments (30-40mmHg), self manual  lymphatic drainage to the abdomen, therapeutic exercise, elevation, skin care, and adherence to a low sodium diet since 10/30/24. While she has seen an improvement in the size of her calves, the lymphatic pooling into her pelvis and abdomen (trunk) has increased and the hyperkeratosis as well as sensitivity into her legs has persisted. For these reasons I do feel that she would benefit from integration of an advanced pneumatic compression pump. She underwent a clinical trial with both a basic and an advanced pump on 12/24/24. The Entre Plus trial was performed in home with Antonella on the RLE for 20 min at low pressure 30mmHG. Patient did not tolerate the method of action well and showed no signs of reduction in limb volume following the trial per the pre and post measurements as noted. Patient noted the static pressure to be uncomfortable. Thus, the trial was stopped.   A FlexiTouch Plus device was then placed on Antonella's  RLE/TR. Treatment time was 30 min with pressure set at normal: 55mmHG distally and 30mmHG proximally. The patient tolerated the method of action with the device and showed improvements in total reduction in limb measurements across all endpoints post demonstration. If there are any questions regarding this recommendation for patients plan of care please feel free to our clinic at [phone].   Barriers to therapy:  Comorbidities  Prognosis: good      Goals:   Short Term Goals:  1 - Patient will be able to tolerate wearing reduction kit to bilateral lower extremities for 12/24 hours (GOAL MET)  2 - Patient will have no less than a 2 cm reduction in limb volume bilaterally (GOAL MET)  3 - Patient will be independent in HEP targeted for lymphatic fluid reduction (GOAL MET)  Short term goal time span:  4-6 weeks    Long Term Goals:  1 - Patient will be independent with maintenance phase management of lymphedema including: compression garments, skin care education, risk reduction strategies, manual  lymphatic drainage, and therapeutic exercise (GOAL IN PROGRESS)  2 - Patient will have no less than a 3 cm reduction in limb volume bilaterally (GOAL MET)    Long term goal time span:  2-4 months    Plan:  Therapy options:  Physical therapy treatment to continue  Planned therapy interventions:  Addressing equipment needs, compression bandaging, compression stocking, decongestive exercises, home exercise program, intermittent compression, manual lymph drainage, Velcro wraps, referral for compression garment & instructions for don/doffing, patient education and range of motion exercises  Planned education:  Functional anatomy and physiology of the lymphatic system, pathophysiology of lymphedema, lymphedema exercise, proper skin care/nutrition, lymphedema precautions, compression bandaging, self massage, long term self-management of lymphedema, home pump use and activity guidelines  Frequency:  1x week  Duration in weeks:  12  Duration in visits:  12  Discussed with:  Patient

## 2025-01-13 ENCOUNTER — PRE-ADMISSION TESTING (OUTPATIENT)
Dept: ADMISSIONS | Facility: MEDICAL CENTER | Age: 60
End: 2025-01-13
Attending: ORTHOPAEDIC SURGERY
Payer: COMMERCIAL

## 2025-01-13 VITALS — BODY MASS INDEX: 40.46 KG/M2 | WEIGHT: 237 LBS | HEIGHT: 64 IN

## 2025-01-13 DIAGNOSIS — Z01.812 PRE-OPERATIVE LABORATORY EXAMINATION: ICD-10-CM

## 2025-01-13 LAB
ANION GAP SERPL CALC-SCNC: 10 MMOL/L (ref 7–16)
BUN SERPL-MCNC: 21 MG/DL (ref 8–22)
CALCIUM SERPL-MCNC: 9.5 MG/DL (ref 8.4–10.2)
CHLORIDE SERPL-SCNC: 106 MMOL/L (ref 96–112)
CO2 SERPL-SCNC: 25 MMOL/L (ref 20–33)
CREAT SERPL-MCNC: 0.63 MG/DL (ref 0.5–1.4)
ERYTHROCYTE [DISTWIDTH] IN BLOOD BY AUTOMATED COUNT: 44.6 FL (ref 35.9–50)
EST. AVERAGE GLUCOSE BLD GHB EST-MCNC: 105 MG/DL
GFR SERPLBLD CREATININE-BSD FMLA CKD-EPI: 101 ML/MIN/1.73 M 2
GLUCOSE SERPL-MCNC: 88 MG/DL (ref 65–99)
HBA1C MFR BLD: 5.3 % (ref 4–5.6)
HCT VFR BLD AUTO: 40.3 % (ref 37–47)
HGB BLD-MCNC: 13 G/DL (ref 12–16)
MCH RBC QN AUTO: 28.8 PG (ref 27–33)
MCHC RBC AUTO-ENTMCNC: 32.3 G/DL (ref 32.2–35.5)
MCV RBC AUTO: 89.2 FL (ref 81.4–97.8)
PLATELET # BLD AUTO: 346 K/UL (ref 164–446)
PMV BLD AUTO: 9.7 FL (ref 9–12.9)
POTASSIUM SERPL-SCNC: 4.2 MMOL/L (ref 3.6–5.5)
RBC # BLD AUTO: 4.52 M/UL (ref 4.2–5.4)
SCCMEC + MECA PNL NOSE NAA+PROBE: NEGATIVE
SCCMEC + MECA PNL NOSE NAA+PROBE: POSITIVE
SODIUM SERPL-SCNC: 141 MMOL/L (ref 135–145)
WBC # BLD AUTO: 10.4 K/UL (ref 4.8–10.8)

## 2025-01-13 PROCEDURE — 83036 HEMOGLOBIN GLYCOSYLATED A1C: CPT

## 2025-01-13 PROCEDURE — 85027 COMPLETE CBC AUTOMATED: CPT

## 2025-01-13 PROCEDURE — 87640 STAPH A DNA AMP PROBE: CPT

## 2025-01-13 PROCEDURE — 87641 MR-STAPH DNA AMP PROBE: CPT

## 2025-01-13 PROCEDURE — 80048 BASIC METABOLIC PNL TOTAL CA: CPT

## 2025-01-13 PROCEDURE — 36415 COLL VENOUS BLD VENIPUNCTURE: CPT

## 2025-01-13 ASSESSMENT — FIBROSIS 4 INDEX: FIB4 SCORE: 0.81

## 2025-01-13 NOTE — PREPROCEDURE INSTRUCTIONS
PreAdmit Telephone Appointment: Reviewed the Preparing for your procedure handout with patient over the phone. Patient instructed per pharmacy guidelines regarding taking, holding or contacting provider for instructions on regularly prescribed medications before surgery. Instructed to take the following medications the day of surgery with a sip of water per pharmacy medication guidelines:  Nasal spray, levothyroxine, zyrtec    Confirmed with patient where to check in morning of surgery. Handouts/Brochure/Video emailed to patient.

## 2025-01-13 NOTE — PREADMIT AVS NOTE
Current Medications   Medication Instructions    Oxymetazoline HCl (NASAL SPRAY 12 HOUR NA) Continue taking medication as prescribed, including morning of procedure     levothyroxine (SYNTHROID) 150 MCG Tab Continue taking medication as prescribed, including morning of procedure     Vitamin D-Vitamin K (VITAMIN K2-VITAMIN D3 PO) Stop 7 days before surgery    Ascorbic Acid (VITAMIN C) 1000 MG Tab Stop 7 days before surgery    Potassium 99 MG Tab Stop 7 days before surgery    Zinc 100 MG Tab Stop 7 days before surgery    Magnesium 200 MG Tab Stop 7 days before surgery    triamcinolone acetonide (KENALOG) 0.5 % Cream Hold medication day of procedure    cetirizine (ZYRTEC) 10 MG TABS Continue taking medication as prescribed, including morning of procedure

## 2025-01-14 ENCOUNTER — APPOINTMENT (OUTPATIENT)
Dept: URBAN - METROPOLITAN AREA CLINIC 6 | Facility: CLINIC | Age: 60
Setting detail: DERMATOLOGY
End: 2025-01-14

## 2025-01-14 DIAGNOSIS — L71.8 OTHER ROSACEA: ICD-10-CM | Status: INADEQUATELY CONTROLLED

## 2025-01-14 PROCEDURE — ? PHOTO-DOCUMENTATION

## 2025-01-14 PROCEDURE — ? COUNSELING

## 2025-01-14 PROCEDURE — ? PRESCRIPTION

## 2025-01-14 PROCEDURE — 99214 OFFICE O/P EST MOD 30 MIN: CPT

## 2025-01-14 PROCEDURE — ? PRESCRIPTION MEDICATION MANAGEMENT

## 2025-01-14 RX ORDER — IVERMECTIN 10 MG/G
CREAM TOPICAL
Qty: 45 | Refills: 3 | Status: ERX | COMMUNITY
Start: 2025-01-14

## 2025-01-14 RX ADMIN — IVERMECTIN: 10 CREAM TOPICAL at 00:00

## 2025-01-14 ASSESSMENT — SEVERITY ASSESSMENT OVERALL AMONG ALL PATIENTS
IN YOUR EXPERIENCE, AMONG ALL PATIENTS YOU HAVE SEEN WITH THIS CONDITION, HOW SEVERE IS THIS PATIENT'S CONDITION?: MILD TO MODERATE

## 2025-01-14 ASSESSMENT — LOCATION SIMPLE DESCRIPTION DERM
LOCATION SIMPLE: RIGHT CHEEK
LOCATION SIMPLE: NOSE
LOCATION SIMPLE: LEFT CHEEK

## 2025-01-14 ASSESSMENT — LOCATION DETAILED DESCRIPTION DERM
LOCATION DETAILED: RIGHT INFERIOR MEDIAL MALAR CHEEK
LOCATION DETAILED: LEFT CENTRAL MALAR CHEEK
LOCATION DETAILED: NASAL DORSUM

## 2025-01-14 ASSESSMENT — LOCATION ZONE DERM
LOCATION ZONE: FACE
LOCATION ZONE: NOSE

## 2025-01-14 NOTE — PROCEDURE: PRESCRIPTION MEDICATION MANAGEMENT
Detail Level: Zone
Plan: She is having knee replacement surgery soon and wants to avoid oral abx, will consider doxycycline 50mg at f/u visit.
Render In Strict Bullet Format?: No
Modify Regimen: Advised to wash face once daily with Dr. Ornelas Sulfur Bar (10%) in addition to regular use of physical tinted sunscreen.
Initiate Treatment: Ivermectin 1% cream QD.

## 2025-01-17 ENCOUNTER — PHYSICAL THERAPY (OUTPATIENT)
Dept: PHYSICAL THERAPY | Facility: MEDICAL CENTER | Age: 60
End: 2025-01-17
Attending: NURSE PRACTITIONER
Payer: COMMERCIAL

## 2025-01-17 DIAGNOSIS — R60.0 BILATERAL LOWER EXTREMITY EDEMA: ICD-10-CM

## 2025-01-17 PROCEDURE — 97140 MANUAL THERAPY 1/> REGIONS: CPT

## 2025-01-17 SDOH — ECONOMIC STABILITY: GENERAL: QUALITY OF LIFE: GOOD

## 2025-01-17 ASSESSMENT — ENCOUNTER SYMPTOMS
PAIN SCALE: 2
ALLEVIATING FACTORS: NOTHING
PAIN SCALE AT LOWEST: 2
EXACERBATED BY: WALKING
PAIN TIMING: ALL DAY
PAIN SCALE AT HIGHEST: 6
EXACERBATED BY: STANDING

## 2025-01-17 NOTE — OP THERAPY DAILY TREATMENT
Outpatient Physical Therapy  LYMPHEDEMA THERAPY DAILY TREATMENT     [facility] Outpatient Physical Therapy  [address]  [place] [address]  Phone:  [phone]  Fax:  [phone]    Date: 2025    Patient: Antonella Frausto  YOB: 1965  MRN: [id number]     Time Calculation    Start time: 0815  Stop time: 0854 Time Calculation (min): 39 minutes         Chief Complaint: Lymphedema Aquired    Visit #: 12    Subjective:   History of Present Illness:     Mechanism of injury:  Patient is scheduled for her surgery on 25. Her pump will arrive today and she will get set up next week. She is feeling nervous, but also ready to proceed with her TKA   Quality of life:  Good  Headaches:  no headaches  Sleep disturbance:  Not disrupted  Pain:     Current pain ratin    At best pain ratin    At worst pain ratin    Location:  Heaviness into the limbs bilaterally    Pain timing:  All day    Relieving factors:  Nothing    Aggravating factors:  Walking and standing    Progression:  Unchanged  Social Support:     Lives in:  One-story house    Lives with:  Spouse  Treatments:     None    Patient Goals:     Patient goals for therapy:  Decreased edema and decreased pain      Lymphedema Objective    Visit type  Lipedema and Lipolymphedema    Postural Observation  Seated posture is fair  Standing posture is fair  Correction of posture has no consistent effect      Skin Appearance    moderate edema,  .    Sensation      Left Lower Extremity    Light tough is Impaired    Right Lower Extremity    Light touch is Impaired    Sensation Comments  Hypersensitivity  Left Lower Extremity Circumferential Measurements  Waist: 107.1 cm  Hip: 139.4 cm  Scrotum: cm  Ground/Upper Thigh: 74.9 cm  Mid Thigh: 65.4 cm  Knee: 57.4 cm  Upper Calf: 53.7 cm  Mid Calf: 49.9 cm  Ankle: 32.4 cm  Heel to Foot: 24.4 cm  Total: 604.6 cm    Right Lower Extremity Circumferential  Measurements  Waist: 107.1 cm  Hip: 139.4 cm  Scrotum: cm  Ground/Upper Thigh: 73.9 cm  Mid Thigh: 55.4 cm  Knee: 46.5 cm  Upper Calf: 54.5 cm  Mid Calf: 48.6 cm  Ankle: 32.4 cm  Heel to Foot: 24.4 cm  Total: 582.2 cm    Lymphedema Stage  Stage 2 Lymphedema    Other Notes  L ankle - 31.5 cm  L Calf 55.1 cm  Left knee 50.3 cm  Length -49.4 cm  Left lower thigh - 56.4 cm  Left upper thigh - 57.8 cm  R ankle - 30.7 cm  R Calf 53.2 cm  R  knee 51.9 cm  Length - 34.1 cm  Right lower thigh - 57.6 cm  Right upper thigh - 62.1 cm          Therapeutic Treatments and Modalities:     2. Manual Therapy (CPT 54609)    Therapeutic Treatment and Modalities Summary: Short neck sequence, abdominal sequence, establishment of inguinla to inguinal anastamoses, establishment of inguinal to axillary anastamoses, decongestion of the bilateral lower extremities from proximal to distal with an emphasis on medial to lateral drainage    The purpose of Manual Lymph Drainage (MLD) is to reduce lymph volume in the affected limb by increasing intake of lymphatic load into the lymphatic system, increasing the volume of transported lymph fluid, moving lymph fluid in superficial lymph vessels to collateral lymph collectors, anastomoses, or tissue channels, and increasing venous return. The goal of MLD is to re-route the lymph flow around blocked areas into more centrally located healthy lymph vessels, which drain into the venous system.      Time-based treatments/modalities:    Physical Therapy Timed Treatment Charges  Manual therapy minutes (CPT 50072): 39 minutes      Assessment and Plan:   Assessment details:  Patient will review with her surgeon when she can resume use of her pneumatic compression pump after surgery. We have one additional session prior to her procedure. Will review activity recommendations for individuals at increased risk of swelling next session   Barriers to therapy:  Comorbidities  Prognosis: good      Goals:   Short Term  Goals:  1 - Patient will be able to tolerate wearing reduction kit to bilateral lower extremities for 12/24 hours (GOAL MET)  2 - Patient will have no less than a 2 cm reduction in limb volume bilaterally (GOAL MET)  3 - Patient will be independent in HEP targeted for lymphatic fluid reduction (GOAL MET)  Short term goal time span:  4-6 weeks    Long Term Goals:  1 - Patient will be independent with maintenance phase management of lymphedema including: compression garments, skin care education, risk reduction strategies, manual lymphatic drainage, and therapeutic exercise (GOAL IN PROGRESS)  2 - Patient will have no less than a 3 cm reduction in limb volume bilaterally (GOAL MET)    Long term goal time span:  2-4 months    Plan:  Therapy options:  Physical therapy treatment to continue  Planned therapy interventions:  Addressing equipment needs, compression bandaging, compression stocking, decongestive exercises, home exercise program, intermittent compression, manual lymph drainage, Velcro wraps, referral for compression garment & instructions for don/doffing, patient education and range of motion exercises  Planned education:  Functional anatomy and physiology of the lymphatic system, pathophysiology of lymphedema, lymphedema exercise, proper skin care/nutrition, lymphedema precautions, compression bandaging, self massage, long term self-management of lymphedema, home pump use and activity guidelines  Frequency:  1x week  Duration in weeks:  12  Duration in visits:  12  Discussed with:  Patient

## 2025-01-22 RX ORDER — OXYCODONE HYDROCHLORIDE 5 MG/1
5-7.5 TABLET ORAL EVERY 6 HOURS PRN
Qty: 42 TABLET | Refills: 0 | Status: SHIPPED | OUTPATIENT
Start: 2025-01-22 | End: 2025-01-29

## 2025-01-22 RX ORDER — ACETAMINOPHEN 500 MG
1000 TABLET ORAL 3 TIMES DAILY PRN
Qty: 90 TABLET | Refills: 0
Start: 2025-01-22 | End: 2025-02-21

## 2025-01-22 RX ORDER — ASPIRIN 81 MG/1
81 TABLET, CHEWABLE ORAL 2 TIMES DAILY
Qty: 56 TABLET | Refills: 0
Start: 2025-01-22 | End: 2025-02-19

## 2025-01-22 RX ORDER — TRAMADOL HYDROCHLORIDE 50 MG/1
50-75 TABLET ORAL EVERY 6 HOURS PRN
Qty: 35 TABLET | Refills: 0 | Status: SHIPPED | OUTPATIENT
Start: 2025-01-22 | End: 2025-01-29

## 2025-01-22 RX ORDER — OMEGA-3 FATTY ACIDS/FISH OIL 300-1000MG
600 CAPSULE ORAL 3 TIMES DAILY
Qty: 120 CAPSULE | Refills: 0
Start: 2025-01-22 | End: 2025-02-21

## 2025-01-22 RX ORDER — ONDANSETRON 4 MG/1
4 TABLET, ORALLY DISINTEGRATING ORAL EVERY 6 HOURS PRN
Qty: 20 TABLET | Refills: 1 | Status: SHIPPED | OUTPATIENT
Start: 2025-01-22

## 2025-01-22 RX ORDER — DOCUSATE SODIUM 100 MG/1
100 CAPSULE, LIQUID FILLED ORAL 2 TIMES DAILY
Qty: 60 CAPSULE | Refills: 0 | Status: SHIPPED | OUTPATIENT
Start: 2025-01-22 | End: 2025-02-21

## 2025-01-22 NOTE — DISCHARGE INSTRUCTIONS
Patient will be discharged home and follow up with Dr. Villatoro in 2 weeks, for which the patient already has scheduled.    You may call or text Dr. Villatoro' medical assistant during business hours at [phone].  You may call the emergency BON line during nights/weekends/holidays if needed at [phone].    Instructions:  -Leave the bandage in place until your followup appointment in 2 weeks.  -May shower with bandage in place, if bandage becomes soaked underneath please remove and call the office immediately.  -No baths/tubs/pools/submersion of the wound for now.  -Call if there is significant drainage beneath the bandage    -Put as much weight as comfortable on the operative leg.  Use a walker to assist with balance to avoid any falls.  -It is important to start moving and stretching right away, otherwise the joint can stiffen.    -Take your blood clot prevention medication for 4 weeks after surgery (81 mg of Aspirin, twice daily). This is over the counter and will not be prescribed  -Use ice frequently to help with pain and swelling control  -Pain management:  Standard pain regimen should be:  Ice as much as possible.  Apply the ice anywhere you feel pain.  Advil 600 mg 3 times a day for 1 month.  You can alternate this with the Tylenol.  Do not take any other NSAIDs while taking this.  Tylenol (acetaminophen) - 1000mg every 8 hours. Take this automatically on a scheduled basis. This is over the counter and will not be prescribed  Tramadol - 1-2 capsules every 6 hours. Take this automatically on a scheduled basis until no longer needed for pain.  Oxycodone - 1-2 tablets every 6 hours only if needed.  You would take the oxycodone 3 hours after the Tramadol, such that you are taking one or the other every 3 hours        -Try to limit the amount of oxycodone since it tends to cause constipation, drowsiness, etc.  But if you need it, take it.        -100mg of Colace (docusate) will be prescribed. Take this twice  a day while still taking Tramadol or Oxycodone. Can discontinue after opiates are no longer being taken.  If bowel movement has not occurred in 48 hours please add in a laxative called Senna (over the counter) twice daily in conjunction with the colace. If no bowel movement is produced 48 hours after adding in Senna please start Milk of Magnesia (over the counter).     AGGRESSIVE RANGE OF MOTION.  Do a quad set a million times per day.  Have a family member push down on the thigh and shin every 20 minutes with 2-3 pillows under the heel to help stretch the knee straight.  Flex the knee as far as possible every 20 minutes.     If any questions arise, call your provider.     MEDICATIONS: Resume taking daily medication.  Take prescribed pain medication with food.  If no medication is prescribed, you may take non-aspirin pain medication if needed.  PAIN MEDICATION CAN BE VERY CONSTIPATING.  Take a stool softener or laxative such as senokot, pericolace, or milk of magnesia if needed.    Last pain medication:  Oxycodone given at 11:13  Tylenol given at 07:48  Robaxin given at 07:48    Please remove scopolamine patch (behind ear) within 72 hours. Please do not touch your eyes after handling.     What to Expect Post Anesthesia    Rest and take it easy for the first 24 hours.  A responsible adult is recommended to remain with you during that time.  It is normal to feel sleepy.  We encourage you to not do anything that requires balance, judgment or coordination.    FOR 24 HOURS DO NOT:  Drive, operate machinery or run household appliances.  Drink beer or alcoholic beverages.  Make important decisions or sign legal documents.    To avoid nausea, slowly advance diet as tolerated, avoiding spicy or greasy foods for the first day.  Add more substantial food to your diet according to your provider's instructions.  Babies can be fed formula or breast milk as soon as they are hungry.  INCREASE FLUIDS AND FIBER TO AVOID  "CONSTIPATION.    MILD FLU-LIKE SYMPTOMS ARE NORMAL.  YOU MAY EXPERIENCE GENERALIZED MUSCLE ACHES, THROAT IRRITATION, HEADACHE AND/OR SOME NAUSEA.    Peripheral Nerve Block Discharge Instructions from Same Day Surgery and Inpatient :    What to Expect - Lower Extremity  The block may cause you to experience numbness and weakness in your thigh and knee or calf and foot on the same side as your surgery  Numbness, tingling and / or weakness are all normal. For some people, this may be an unpleasant sensation  These issues will be resolved when the local anesthetic wears off   You may experience numbness and tingling in your thigh on the same side as your surgery if the block medicine was injected at your groin area  Numbness will make it difficult to walk  You may have problems with balance and walking so be very careful   Follow your surgeon's direction regarding weight bearing on your surgical limb  Be very careful with your numb limb  Precautions  The numbness may affect your balance  Be careful when walking or moving around  Your leg may be weak: be very careful putting weight on it  If your surgeon did not specify a time, you should not bear weight for 24 hours  Be sure to ask for help when you need it  It is better to have help than to fall and hurt yourself  Prevent Injury  Protect the limb like a baby  Beware of exposing your limb to extreme heat or cold or trauma  The limb may be injured without you noticing because it is numb  Keep the limb elevated whenever possible  Do not sleep on the limb  Change the position of the limb regularly  Avoid putting pressure on your surgical limb  Pain Control  The initial block on the day of surgery will make your extremity feel \"numb\"  Any consecutive injection including prior to discharge from the hospital will make your extremity feel \"numb\"  You may feel an aching or burning when the local anesthesia starts to wear off  Take pain pills as prescribed by your surgeon  Call " your surgeon or anesthesiologist if you do not have adequate pain control

## 2025-01-23 ENCOUNTER — PHYSICAL THERAPY (OUTPATIENT)
Dept: PHYSICAL THERAPY | Facility: MEDICAL CENTER | Age: 60
End: 2025-01-23
Attending: NURSE PRACTITIONER
Payer: COMMERCIAL

## 2025-01-23 DIAGNOSIS — R60.0 BILATERAL LOWER EXTREMITY EDEMA: ICD-10-CM

## 2025-01-23 PROCEDURE — 97140 MANUAL THERAPY 1/> REGIONS: CPT

## 2025-01-23 SDOH — ECONOMIC STABILITY: GENERAL: QUALITY OF LIFE: GOOD

## 2025-01-23 ASSESSMENT — ENCOUNTER SYMPTOMS
PAIN SCALE: 2
PAIN TIMING: ALL DAY
EXACERBATED BY: STANDING
EXACERBATED BY: WALKING
PAIN SCALE AT HIGHEST: 6
PAIN SCALE AT LOWEST: 2
ALLEVIATING FACTORS: NOTHING

## 2025-01-23 NOTE — OP THERAPY DAILY TREATMENT
Outpatient Physical Therapy  LYMPHEDEMA THERAPY DAILY TREATMENT     [facility] Outpatient Physical Therapy  [address]  [place] [address]  Phone:  [phone]  Fax:  [phone]    Date: 2025    Patient: Antonella Frausto  YOB: 1965  MRN: [id number]     Time Calculation    Start time: 0948  Stop time: 1030 Time Calculation (min): 42 minutes         Chief Complaint: Lymphedema Aquired    Visit #: 13    Subjective:   History of Present Illness:     Mechanism of injury:  Patient is nervous, but ready for her TKA 25. She is curious how much she has reduced as she would like to tell her surgeon before her procedure  Quality of life:  Good  Headaches:  no headaches  Sleep disturbance:  Not disrupted  Pain:     Current pain ratin    At best pain ratin    At worst pain ratin    Location:  Heaviness into the limbs bilaterally    Pain timing:  All day    Relieving factors:  Nothing    Aggravating factors:  Walking and standing    Progression:  Unchanged  Social Support:     Lives in:  One-story house    Lives with:  Spouse  Treatments:     None    Patient Goals:     Patient goals for therapy:  Decreased edema and decreased pain      Lymphedema Objective    Visit type  Lipedema and Lipolymphedema    Postural Observation  Seated posture is fair  Standing posture is fair  Correction of posture has no consistent effect      Skin Appearance    moderate edema,  .    Sensation      Left Lower Extremity    Light tough is Impaired    Right Lower Extremity    Light touch is Impaired    Sensation Comments  Hypersensitivity  Left Lower Extremity Circumferential Measurements  Waist: 107.1 cm  Hip: 139.4 cm  Scrotum: cm  Ground/Upper Thigh: 74.9 cm  Mid Thigh: 65.4 cm  Knee: 57.4 cm  Upper Calf: 53.7 cm  Mid Calf: 49.9 cm  Ankle: 32.4 cm  Heel to Foot: 24.4 cm  Total: 604.6 cm    Right Lower Extremity Circumferential Measurements  Waist: 107.1 cm  Hip:  139.4 cm  Scrotum: cm  Ground/Upper Thigh: 64.9 cm  Mid Thigh: 51.4 cm  Knee: 44 cm  Upper Calf: 53.1 cm  Mid Calf: 45.9 cm  Ankle: 31.4 cm  Heel to Foot: 24.7 cm  Total: 561.9 cm    Lymphedema Stage  Stage 2 Lymphedema    Other Notes  L ankle - 31.5 cm  L Calf 55.1 cm  Left knee 50.3 cm  Length -49.4 cm  Left lower thigh - 56.4 cm  Left upper thigh - 57.8 cm  R ankle - 30.7 cm  R Calf 53.2 cm  R  knee 51.9 cm  Length - 34.1 cm  Right lower thigh - 57.6 cm  Right upper thigh - 62.1 cm          Therapeutic Treatments and Modalities:     2. Manual Therapy (CPT 30438)    Therapeutic Treatment and Modalities Summary: Short neck sequence, abdominal sequence, establishment of inguinla to inguinal anastamoses, establishment of inguinal to axillary anastamoses, decongestion of the bilateral lower extremities from proximal to distal with an emphasis on medial to lateral drainage    The purpose of Manual Lymph Drainage (MLD) is to reduce lymph volume in the affected limb by increasing intake of lymphatic load into the lymphatic system, increasing the volume of transported lymph fluid, moving lymph fluid in superficial lymph vessels to collateral lymph collectors, anastomoses, or tissue channels, and increasing venous return. The goal of MLD is to re-route the lymph flow around blocked areas into more centrally located healthy lymph vessels, which drain into the venous system.      Time-based treatments/modalities:    Physical Therapy Timed Treatment Charges  Manual therapy minutes (CPT 14499): 42 minutes      Assessment and Plan:   Assessment details:  Patient has lost 16 inches total from her RLE and is very pleased with this number. Josy was unable to do her final pump demo this week as she was ill, but will be out to the house on Monday to set her up. She will speak with her surgeon about when she can resume use of her device and will get her in-elastic wraps back on and in place as soon as she is cleared by her  surgeon. Will follow up and re-evaluate post op  Barriers to therapy:  Comorbidities  Prognosis: good      Goals:   Short Term Goals:  1 - Patient will be able to tolerate wearing reduction kit to bilateral lower extremities for 12/24 hours (GOAL MET)  2 - Patient will have no less than a 2 cm reduction in limb volume bilaterally (GOAL MET)  3 - Patient will be independent in HEP targeted for lymphatic fluid reduction (GOAL MET)  Short term goal time span:  4-6 weeks    Long Term Goals:  1 - Patient will be independent with maintenance phase management of lymphedema including: compression garments, skin care education, risk reduction strategies, manual lymphatic drainage, and therapeutic exercise (GOAL IN PROGRESS)  2 - Patient will have no less than a 3 cm reduction in limb volume bilaterally (GOAL MET)    Long term goal time span:  2-4 months    Plan:  Therapy options:  Physical therapy treatment to continue  Planned therapy interventions:  Addressing equipment needs, compression bandaging, compression stocking, decongestive exercises, home exercise program, intermittent compression, manual lymph drainage, Velcro wraps, referral for compression garment & instructions for don/doffing, patient education and range of motion exercises  Planned education:  Functional anatomy and physiology of the lymphatic system, pathophysiology of lymphedema, lymphedema exercise, proper skin care/nutrition, lymphedema precautions, compression bandaging, self massage, long term self-management of lymphedema, home pump use and activity guidelines  Frequency:  1x week  Duration in weeks:  12  Duration in visits:  12  Discussed with:  Patient

## 2025-01-27 ENCOUNTER — ANESTHESIA EVENT (OUTPATIENT)
Dept: SURGERY | Facility: MEDICAL CENTER | Age: 60
End: 2025-01-27
Payer: COMMERCIAL

## 2025-01-28 ENCOUNTER — HOSPITAL ENCOUNTER (OUTPATIENT)
Facility: MEDICAL CENTER | Age: 60
End: 2025-01-28
Attending: ORTHOPAEDIC SURGERY | Admitting: ORTHOPAEDIC SURGERY
Payer: COMMERCIAL

## 2025-01-28 ENCOUNTER — ANESTHESIA (OUTPATIENT)
Dept: SURGERY | Facility: MEDICAL CENTER | Age: 60
End: 2025-01-28
Payer: COMMERCIAL

## 2025-01-28 VITALS
BODY MASS INDEX: 40.52 KG/M2 | SYSTOLIC BLOOD PRESSURE: 123 MMHG | RESPIRATION RATE: 18 BRPM | HEART RATE: 69 BPM | DIASTOLIC BLOOD PRESSURE: 80 MMHG | TEMPERATURE: 97.3 F | WEIGHT: 237.32 LBS | HEIGHT: 64 IN | OXYGEN SATURATION: 92 %

## 2025-01-28 DIAGNOSIS — G89.18 POST-OPERATIVE PAIN: ICD-10-CM

## 2025-01-28 DIAGNOSIS — M17.11 PRIMARY OSTEOARTHRITIS OF RIGHT KNEE: ICD-10-CM

## 2025-01-28 PROCEDURE — 700102 HCHG RX REV CODE 250 W/ 637 OVERRIDE(OP): Performed by: ANESTHESIOLOGY

## 2025-01-28 PROCEDURE — A9270 NON-COVERED ITEM OR SERVICE: HCPCS | Performed by: ANESTHESIOLOGY

## 2025-01-28 PROCEDURE — 700111 HCHG RX REV CODE 636 W/ 250 OVERRIDE (IP): Mod: JZ | Performed by: ORTHOPAEDIC SURGERY

## 2025-01-28 PROCEDURE — 700111 HCHG RX REV CODE 636 W/ 250 OVERRIDE (IP): Performed by: STUDENT IN AN ORGANIZED HEALTH CARE EDUCATION/TRAINING PROGRAM

## 2025-01-28 PROCEDURE — 502240 HCHG MISC OR SUPPLY RC 0272: Performed by: ORTHOPAEDIC SURGERY

## 2025-01-28 PROCEDURE — 160036 HCHG PACU - EA ADDL 30 MINS PHASE I: Performed by: ORTHOPAEDIC SURGERY

## 2025-01-28 PROCEDURE — 160041 HCHG SURGERY MINUTES - EA ADDL 1 MIN LEVEL 4: Performed by: ORTHOPAEDIC SURGERY

## 2025-01-28 PROCEDURE — 160035 HCHG PACU - 1ST 60 MINS PHASE I: Performed by: ORTHOPAEDIC SURGERY

## 2025-01-28 PROCEDURE — 160002 HCHG RECOVERY MINUTES (STAT): Performed by: ORTHOPAEDIC SURGERY

## 2025-01-28 PROCEDURE — 97161 PT EVAL LOW COMPLEX 20 MIN: CPT

## 2025-01-28 PROCEDURE — 160029 HCHG SURGERY MINUTES - 1ST 30 MINS LEVEL 4: Performed by: ORTHOPAEDIC SURGERY

## 2025-01-28 PROCEDURE — 700105 HCHG RX REV CODE 258: Performed by: ORTHOPAEDIC SURGERY

## 2025-01-28 PROCEDURE — C1776 JOINT DEVICE (IMPLANTABLE): HCPCS | Performed by: ORTHOPAEDIC SURGERY

## 2025-01-28 PROCEDURE — 700101 HCHG RX REV CODE 250: Performed by: ORTHOPAEDIC SURGERY

## 2025-01-28 PROCEDURE — 27447 TOTAL KNEE ARTHROPLASTY: CPT | Mod: RT | Performed by: ORTHOPAEDIC SURGERY

## 2025-01-28 PROCEDURE — 700101 HCHG RX REV CODE 250: Performed by: ANESTHESIOLOGY

## 2025-01-28 PROCEDURE — 27447 TOTAL KNEE ARTHROPLASTY: CPT | Mod: ASROC,RT | Performed by: STUDENT IN AN ORGANIZED HEALTH CARE EDUCATION/TRAINING PROGRAM

## 2025-01-28 PROCEDURE — 97110 THERAPEUTIC EXERCISES: CPT

## 2025-01-28 PROCEDURE — 160046 HCHG PACU - 1ST 60 MINS PHASE II: Performed by: ORTHOPAEDIC SURGERY

## 2025-01-28 PROCEDURE — 160025 RECOVERY II MINUTES (STATS): Performed by: ORTHOPAEDIC SURGERY

## 2025-01-28 PROCEDURE — 160047 HCHG PACU  - EA ADDL 30 MINS PHASE II: Performed by: ORTHOPAEDIC SURGERY

## 2025-01-28 PROCEDURE — 160009 HCHG ANES TIME/MIN: Performed by: ORTHOPAEDIC SURGERY

## 2025-01-28 PROCEDURE — 700111 HCHG RX REV CODE 636 W/ 250 OVERRIDE (IP): Performed by: ANESTHESIOLOGY

## 2025-01-28 PROCEDURE — 160048 HCHG OR STATISTICAL LEVEL 1-5: Performed by: ORTHOPAEDIC SURGERY

## 2025-01-28 PROCEDURE — 64447 NJX AA&/STRD FEMORAL NRV IMG: CPT | Performed by: ORTHOPAEDIC SURGERY

## 2025-01-28 DEVICE — BASEPLATE TIBIAL TRIATHLON TRITANIUM SIZE 4 (1EA): Type: IMPLANTABLE DEVICE | Site: KNEE | Status: FUNCTIONAL

## 2025-01-28 DEVICE — COMPONENT FEMORAL TRIATHLON CRUCIATE RETAIN RIGHT SIZE 4 (1EA): Type: IMPLANTABLE DEVICE | Site: KNEE | Status: FUNCTIONAL

## 2025-01-28 DEVICE — PIN FIXATION STAINLESS STEEL STERILE FLUTE HEADLESS 1/8IN PIN 3.5IN LONG (1EA): Type: IMPLANTABLE DEVICE | Site: KNEE | Status: FUNCTIONAL

## 2025-01-28 DEVICE — COMPONENT PATELLAR TRIATHLON TRITANIUM ASYMMETRIC METAL BACKED PATELLA 35X10MM (1EA): Type: IMPLANTABLE DEVICE | Site: KNEE | Status: FUNCTIONAL

## 2025-01-28 DEVICE — IMPLANTABLE DEVICE: Type: IMPLANTABLE DEVICE | Site: KNEE | Status: FUNCTIONAL

## 2025-01-28 RX ORDER — SODIUM CHLORIDE, SODIUM LACTATE, POTASSIUM CHLORIDE, CALCIUM CHLORIDE 600; 310; 30; 20 MG/100ML; MG/100ML; MG/100ML; MG/100ML
INJECTION, SOLUTION INTRAVENOUS CONTINUOUS
Status: DISCONTINUED | OUTPATIENT
Start: 2025-01-28 | End: 2025-01-28 | Stop reason: HOSPADM

## 2025-01-28 RX ORDER — AMOXICILLIN 250 MG
1 CAPSULE ORAL
Status: CANCELLED | OUTPATIENT
Start: 2025-01-28

## 2025-01-28 RX ORDER — METOPROLOL TARTRATE 1 MG/ML
1 INJECTION, SOLUTION INTRAVENOUS
Status: DISCONTINUED | OUTPATIENT
Start: 2025-01-28 | End: 2025-01-28 | Stop reason: HOSPADM

## 2025-01-28 RX ORDER — ACETAMINOPHEN 500 MG
1000 TABLET ORAL EVERY 6 HOURS PRN
Status: CANCELLED | OUTPATIENT
Start: 2025-02-02

## 2025-01-28 RX ORDER — HYDROMORPHONE HYDROCHLORIDE 1 MG/ML
0.1 INJECTION, SOLUTION INTRAMUSCULAR; INTRAVENOUS; SUBCUTANEOUS
Status: DISCONTINUED | OUTPATIENT
Start: 2025-01-28 | End: 2025-01-28 | Stop reason: HOSPADM

## 2025-01-28 RX ORDER — ASPIRIN 81 MG/1
81 TABLET ORAL 2 TIMES DAILY
Status: CANCELLED | OUTPATIENT
Start: 2025-01-28

## 2025-01-28 RX ORDER — HYDROMORPHONE HYDROCHLORIDE 1 MG/ML
0.4 INJECTION, SOLUTION INTRAMUSCULAR; INTRAVENOUS; SUBCUTANEOUS
Status: DISCONTINUED | OUTPATIENT
Start: 2025-01-28 | End: 2025-01-28 | Stop reason: HOSPADM

## 2025-01-28 RX ORDER — ONDANSETRON 2 MG/ML
4 INJECTION INTRAMUSCULAR; INTRAVENOUS EVERY 4 HOURS PRN
Status: CANCELLED | OUTPATIENT
Start: 2025-01-28

## 2025-01-28 RX ORDER — KETOROLAC TROMETHAMINE 15 MG/ML
15 INJECTION, SOLUTION INTRAMUSCULAR; INTRAVENOUS EVERY 6 HOURS
Status: CANCELLED | OUTPATIENT
Start: 2025-01-28 | End: 2025-01-31

## 2025-01-28 RX ORDER — DIPHENHYDRAMINE HCL 25 MG
25 TABLET ORAL NIGHTLY PRN
Status: CANCELLED | OUTPATIENT
Start: 2025-01-29

## 2025-01-28 RX ORDER — EPINEPHRINE 1 MG/ML(1)
AMPUL (ML) INJECTION
Status: DISCONTINUED | OUTPATIENT
Start: 2025-01-28 | End: 2025-01-28 | Stop reason: HOSPADM

## 2025-01-28 RX ORDER — ACETAMINOPHEN 500 MG
1000 TABLET ORAL EVERY 6 HOURS
Status: CANCELLED | OUTPATIENT
Start: 2025-01-28 | End: 2025-02-02

## 2025-01-28 RX ORDER — AMOXICILLIN 250 MG
1 CAPSULE ORAL NIGHTLY
Status: CANCELLED | OUTPATIENT
Start: 2025-01-28

## 2025-01-28 RX ORDER — METHOCARBAMOL 500 MG/1
500 TABLET, FILM COATED ORAL ONCE
Status: COMPLETED | OUTPATIENT
Start: 2025-01-28 | End: 2025-01-28

## 2025-01-28 RX ORDER — HYDROMORPHONE HYDROCHLORIDE 1 MG/ML
0.2 INJECTION, SOLUTION INTRAMUSCULAR; INTRAVENOUS; SUBCUTANEOUS
Status: DISCONTINUED | OUTPATIENT
Start: 2025-01-28 | End: 2025-01-28 | Stop reason: HOSPADM

## 2025-01-28 RX ORDER — OMEPRAZOLE 20 MG/1
20 CAPSULE, DELAYED RELEASE ORAL 2 TIMES DAILY PRN
Status: CANCELLED | OUTPATIENT
Start: 2025-01-28 | End: 2025-02-07

## 2025-01-28 RX ORDER — OXYCODONE HCL 10 MG/1
10 TABLET, FILM COATED, EXTENDED RELEASE ORAL ONCE
Status: COMPLETED | OUTPATIENT
Start: 2025-01-28 | End: 2025-01-28

## 2025-01-28 RX ORDER — SCOPOLAMINE 1 MG/3D
1 PATCH, EXTENDED RELEASE TRANSDERMAL
Status: DISCONTINUED | OUTPATIENT
Start: 2025-01-28 | End: 2025-01-28 | Stop reason: HOSPADM

## 2025-01-28 RX ORDER — IPRATROPIUM BROMIDE AND ALBUTEROL SULFATE 2.5; .5 MG/3ML; MG/3ML
3 SOLUTION RESPIRATORY (INHALATION)
Status: DISCONTINUED | OUTPATIENT
Start: 2025-01-28 | End: 2025-01-28 | Stop reason: HOSPADM

## 2025-01-28 RX ORDER — MEPERIDINE HYDROCHLORIDE 25 MG/ML
12.5 INJECTION INTRAMUSCULAR; INTRAVENOUS; SUBCUTANEOUS
Status: DISCONTINUED | OUTPATIENT
Start: 2025-01-28 | End: 2025-01-28 | Stop reason: HOSPADM

## 2025-01-28 RX ORDER — CEFAZOLIN SODIUM 1 G/3ML
2 INJECTION, POWDER, FOR SOLUTION INTRAMUSCULAR; INTRAVENOUS ONCE
Status: COMPLETED | OUTPATIENT
Start: 2025-01-28 | End: 2025-01-28

## 2025-01-28 RX ORDER — LEVOTHYROXINE SODIUM 75 UG/1
150 TABLET ORAL
Status: CANCELLED | OUTPATIENT
Start: 2025-01-28

## 2025-01-28 RX ORDER — SODIUM CHLORIDE, SODIUM LACTATE, POTASSIUM CHLORIDE, CALCIUM CHLORIDE 600; 310; 30; 20 MG/100ML; MG/100ML; MG/100ML; MG/100ML
INJECTION, SOLUTION INTRAVENOUS CONTINUOUS
Status: ACTIVE | OUTPATIENT
Start: 2025-01-28 | End: 2025-01-28

## 2025-01-28 RX ORDER — BUPIVACAINE HYDROCHLORIDE AND EPINEPHRINE 2.5; 5 MG/ML; UG/ML
INJECTION, SOLUTION EPIDURAL; INFILTRATION; INTRACAUDAL; PERINEURAL
Status: COMPLETED | OUTPATIENT
Start: 2025-01-28 | End: 2025-01-28

## 2025-01-28 RX ORDER — OXYCODONE HCL 5 MG/5 ML
5 SOLUTION, ORAL ORAL
Status: COMPLETED | OUTPATIENT
Start: 2025-01-28 | End: 2025-01-28

## 2025-01-28 RX ORDER — LIDOCAINE HYDROCHLORIDE 20 MG/ML
INJECTION, SOLUTION EPIDURAL; INFILTRATION; INTRACAUDAL; PERINEURAL PRN
Status: DISCONTINUED | OUTPATIENT
Start: 2025-01-28 | End: 2025-01-28 | Stop reason: SURG

## 2025-01-28 RX ORDER — DEXAMETHASONE SODIUM PHOSPHATE 4 MG/ML
INJECTION, SOLUTION INTRA-ARTICULAR; INTRALESIONAL; INTRAMUSCULAR; INTRAVENOUS; SOFT TISSUE PRN
Status: DISCONTINUED | OUTPATIENT
Start: 2025-01-28 | End: 2025-01-28 | Stop reason: SURG

## 2025-01-28 RX ORDER — OXYCODONE HYDROCHLORIDE 5 MG/1
5 TABLET ORAL
Status: CANCELLED | OUTPATIENT
Start: 2025-01-28

## 2025-01-28 RX ORDER — DIPHENHYDRAMINE HYDROCHLORIDE 50 MG/ML
12.5 INJECTION INTRAMUSCULAR; INTRAVENOUS
Status: DISCONTINUED | OUTPATIENT
Start: 2025-01-28 | End: 2025-01-28 | Stop reason: HOSPADM

## 2025-01-28 RX ORDER — POLYETHYLENE GLYCOL 3350 17 G/17G
1 POWDER, FOR SOLUTION ORAL 2 TIMES DAILY PRN
Status: CANCELLED | OUTPATIENT
Start: 2025-01-28

## 2025-01-28 RX ORDER — TRANEXAMIC ACID 100 MG/ML
INJECTION, SOLUTION INTRAVENOUS PRN
Status: DISCONTINUED | OUTPATIENT
Start: 2025-01-28 | End: 2025-01-28 | Stop reason: HOSPADM

## 2025-01-28 RX ORDER — OXYCODONE HCL 5 MG/5 ML
10 SOLUTION, ORAL ORAL
Status: COMPLETED | OUTPATIENT
Start: 2025-01-28 | End: 2025-01-28

## 2025-01-28 RX ORDER — HYDROMORPHONE HYDROCHLORIDE 1 MG/ML
0.5 INJECTION, SOLUTION INTRAMUSCULAR; INTRAVENOUS; SUBCUTANEOUS
Status: CANCELLED | OUTPATIENT
Start: 2025-01-28

## 2025-01-28 RX ORDER — BISACODYL 10 MG
10 SUPPOSITORY, RECTAL RECTAL
Status: CANCELLED | OUTPATIENT
Start: 2025-01-28

## 2025-01-28 RX ORDER — DIPHENHYDRAMINE HCL 25 MG
25 TABLET ORAL EVERY 6 HOURS PRN
Status: CANCELLED | OUTPATIENT
Start: 2025-01-28

## 2025-01-28 RX ORDER — METOPROLOL TARTRATE 1 MG/ML
INJECTION, SOLUTION INTRAVENOUS PRN
Status: DISCONTINUED | OUTPATIENT
Start: 2025-01-28 | End: 2025-01-28 | Stop reason: SURG

## 2025-01-28 RX ORDER — DOXYCYCLINE HYCLATE 100 MG
100 TABLET ORAL 2 TIMES DAILY
Qty: 60 TABLET | Refills: 0 | Status: SHIPPED | OUTPATIENT
Start: 2025-01-28 | End: 2025-02-27

## 2025-01-28 RX ORDER — HALOPERIDOL 5 MG/ML
1 INJECTION INTRAMUSCULAR EVERY 6 HOURS PRN
Status: CANCELLED | OUTPATIENT
Start: 2025-01-28

## 2025-01-28 RX ORDER — DEXAMETHASONE SODIUM PHOSPHATE 4 MG/ML
INJECTION, SOLUTION INTRA-ARTICULAR; INTRALESIONAL; INTRAMUSCULAR; INTRAVENOUS; SOFT TISSUE
Status: COMPLETED | OUTPATIENT
Start: 2025-01-28 | End: 2025-01-28

## 2025-01-28 RX ORDER — DIPHENHYDRAMINE HYDROCHLORIDE 50 MG/ML
25 INJECTION INTRAMUSCULAR; INTRAVENOUS EVERY 6 HOURS PRN
Status: CANCELLED | OUTPATIENT
Start: 2025-01-28

## 2025-01-28 RX ORDER — HALOPERIDOL 5 MG/ML
1 INJECTION INTRAMUSCULAR
Status: DISCONTINUED | OUTPATIENT
Start: 2025-01-28 | End: 2025-01-28 | Stop reason: HOSPADM

## 2025-01-28 RX ORDER — MUPIROCIN 20 MG/G
OINTMENT TOPICAL 2 TIMES DAILY
Status: CANCELLED | OUTPATIENT
Start: 2025-01-28 | End: 2025-02-02

## 2025-01-28 RX ORDER — MIDAZOLAM HYDROCHLORIDE 1 MG/ML
INJECTION INTRAMUSCULAR; INTRAVENOUS PRN
Status: DISCONTINUED | OUTPATIENT
Start: 2025-01-28 | End: 2025-01-28 | Stop reason: SURG

## 2025-01-28 RX ORDER — DOCUSATE SODIUM 100 MG/1
100 CAPSULE, LIQUID FILLED ORAL 2 TIMES DAILY
Status: CANCELLED | OUTPATIENT
Start: 2025-01-28

## 2025-01-28 RX ORDER — ONDANSETRON 2 MG/ML
INJECTION INTRAMUSCULAR; INTRAVENOUS PRN
Status: DISCONTINUED | OUTPATIENT
Start: 2025-01-28 | End: 2025-01-28 | Stop reason: SURG

## 2025-01-28 RX ORDER — ACETAMINOPHEN 500 MG
1000 TABLET ORAL ONCE
Status: COMPLETED | OUTPATIENT
Start: 2025-01-28 | End: 2025-01-28

## 2025-01-28 RX ORDER — SCOPOLAMINE 1 MG/3D
1 PATCH, EXTENDED RELEASE TRANSDERMAL
Status: CANCELLED | OUTPATIENT
Start: 2025-01-28

## 2025-01-28 RX ORDER — VANCOMYCIN HYDROCHLORIDE 1 G/20ML
INJECTION, POWDER, LYOPHILIZED, FOR SOLUTION INTRAVENOUS
Status: COMPLETED | OUTPATIENT
Start: 2025-01-28 | End: 2025-01-28

## 2025-01-28 RX ORDER — TRAMADOL HYDROCHLORIDE 50 MG/1
50 TABLET ORAL EVERY 4 HOURS PRN
Status: CANCELLED | OUTPATIENT
Start: 2025-01-28

## 2025-01-28 RX ORDER — SODIUM CHLORIDE 9 MG/ML
INJECTION, SOLUTION INTRAMUSCULAR; INTRAVENOUS; SUBCUTANEOUS
Status: DISCONTINUED | OUTPATIENT
Start: 2025-01-28 | End: 2025-01-28 | Stop reason: HOSPADM

## 2025-01-28 RX ORDER — HYDROMORPHONE HYDROCHLORIDE 2 MG/ML
INJECTION, SOLUTION INTRAMUSCULAR; INTRAVENOUS; SUBCUTANEOUS PRN
Status: DISCONTINUED | OUTPATIENT
Start: 2025-01-28 | End: 2025-01-28 | Stop reason: SURG

## 2025-01-28 RX ORDER — OXYCODONE HYDROCHLORIDE 10 MG/1
10 TABLET ORAL
Status: CANCELLED | OUTPATIENT
Start: 2025-01-28

## 2025-01-28 RX ORDER — ROPIVACAINE HYDROCHLORIDE 5 MG/ML
INJECTION, SOLUTION EPIDURAL; INFILTRATION; PERINEURAL
Status: DISCONTINUED | OUTPATIENT
Start: 2025-01-28 | End: 2025-01-28 | Stop reason: HOSPADM

## 2025-01-28 RX ORDER — HYDRALAZINE HYDROCHLORIDE 20 MG/ML
5 INJECTION INTRAMUSCULAR; INTRAVENOUS
Status: DISCONTINUED | OUTPATIENT
Start: 2025-01-28 | End: 2025-01-28 | Stop reason: HOSPADM

## 2025-01-28 RX ORDER — DEXAMETHASONE SODIUM PHOSPHATE 4 MG/ML
4 INJECTION, SOLUTION INTRA-ARTICULAR; INTRALESIONAL; INTRAMUSCULAR; INTRAVENOUS; SOFT TISSUE
Status: CANCELLED | OUTPATIENT
Start: 2025-01-28

## 2025-01-28 RX ORDER — IBUPROFEN 400 MG/1
800 TABLET, FILM COATED ORAL 3 TIMES DAILY PRN
Status: CANCELLED | OUTPATIENT
Start: 2025-01-31

## 2025-01-28 RX ORDER — ONDANSETRON 2 MG/ML
4 INJECTION INTRAMUSCULAR; INTRAVENOUS
Status: DISCONTINUED | OUTPATIENT
Start: 2025-01-28 | End: 2025-01-28 | Stop reason: HOSPADM

## 2025-01-28 RX ADMIN — ONDANSETRON 4 MG: 2 INJECTION INTRAMUSCULAR; INTRAVENOUS at 09:49

## 2025-01-28 RX ADMIN — OXYCODONE HYDROCHLORIDE 10 MG: 5 SOLUTION ORAL at 11:13

## 2025-01-28 RX ADMIN — PROPOFOL 200 MG: 10 INJECTION, EMULSION INTRAVENOUS at 09:36

## 2025-01-28 RX ADMIN — HYDROMORPHONE HYDROCHLORIDE 1 MG: 2 INJECTION INTRAMUSCULAR; INTRAVENOUS; SUBCUTANEOUS at 09:48

## 2025-01-28 RX ADMIN — TRANEXAMIC ACID 1000 MG: 100 INJECTION, SOLUTION INTRAVENOUS at 10:21

## 2025-01-28 RX ADMIN — PROPOFOL 50 MG: 10 INJECTION, EMULSION INTRAVENOUS at 09:42

## 2025-01-28 RX ADMIN — PROPOFOL 50 MG: 10 INJECTION, EMULSION INTRAVENOUS at 09:48

## 2025-01-28 RX ADMIN — SODIUM CHLORIDE, POTASSIUM CHLORIDE, SODIUM LACTATE AND CALCIUM CHLORIDE: 600; 310; 30; 20 INJECTION, SOLUTION INTRAVENOUS at 09:06

## 2025-01-28 RX ADMIN — METOPROLOL TARTRATE 5 MG: 5 INJECTION INTRAVENOUS at 10:34

## 2025-01-28 RX ADMIN — METHOCARBAMOL 500 MG: 500 TABLET ORAL at 07:48

## 2025-01-28 RX ADMIN — OXYCODONE HYDROCHLORIDE 10 MG: 10 TABLET, FILM COATED, EXTENDED RELEASE ORAL at 07:48

## 2025-01-28 RX ADMIN — DEXAMETHASONE SODIUM PHOSPHATE 4 MG: 4 INJECTION, SOLUTION INTRA-ARTICULAR; INTRALESIONAL; INTRAMUSCULAR; INTRAVENOUS; SOFT TISSUE at 09:36

## 2025-01-28 RX ADMIN — BUPIVACAINE HYDROCHLORIDE AND EPINEPHRINE BITARTRATE 30 ML: 2.5; .0091 INJECTION, SOLUTION EPIDURAL; INFILTRATION; INTRACAUDAL; PERINEURAL at 09:07

## 2025-01-28 RX ADMIN — SCOPOLAMINE 1 PATCH: 1.5 PATCH, EXTENDED RELEASE TRANSDERMAL at 07:48

## 2025-01-28 RX ADMIN — DEXAMETHASONE SODIUM PHOSPHATE 4 MG: 4 INJECTION INTRA-ARTICULAR; INTRALESIONAL; INTRAMUSCULAR; INTRAVENOUS; SOFT TISSUE at 09:07

## 2025-01-28 RX ADMIN — PROPOFOL 50 MG: 10 INJECTION, EMULSION INTRAVENOUS at 10:33

## 2025-01-28 RX ADMIN — ACETAMINOPHEN 1000 MG: 500 TABLET ORAL at 07:48

## 2025-01-28 RX ADMIN — TRANEXAMIC ACID 1000 MG: 100 INJECTION, SOLUTION INTRAVENOUS at 09:44

## 2025-01-28 RX ADMIN — PROPOFOL 50 MG: 10 INJECTION, EMULSION INTRAVENOUS at 09:57

## 2025-01-28 RX ADMIN — MIDAZOLAM HYDROCHLORIDE 2 MG: 1 INJECTION, SOLUTION INTRAMUSCULAR; INTRAVENOUS at 09:06

## 2025-01-28 RX ADMIN — HYDROMORPHONE HYDROCHLORIDE 0.5 MG: 2 INJECTION INTRAMUSCULAR; INTRAVENOUS; SUBCUTANEOUS at 09:36

## 2025-01-28 RX ADMIN — LIDOCAINE HYDROCHLORIDE 60 MG: 20 INJECTION, SOLUTION EPIDURAL; INFILTRATION; INTRACAUDAL; PERINEURAL at 09:36

## 2025-01-28 RX ADMIN — HYDROMORPHONE HYDROCHLORIDE 0.5 MG: 2 INJECTION INTRAMUSCULAR; INTRAVENOUS; SUBCUTANEOUS at 09:46

## 2025-01-28 RX ADMIN — CEFAZOLIN 2 G: 1 INJECTION, POWDER, FOR SOLUTION INTRAMUSCULAR; INTRAVENOUS at 09:36

## 2025-01-28 ASSESSMENT — COGNITIVE AND FUNCTIONAL STATUS - GENERAL
TURNING FROM BACK TO SIDE WHILE IN FLAT BAD: A LITTLE
MOVING TO AND FROM BED TO CHAIR: A LITTLE
WALKING IN HOSPITAL ROOM: A LITTLE
STANDING UP FROM CHAIR USING ARMS: A LITTLE
MOVING FROM LYING ON BACK TO SITTING ON SIDE OF FLAT BED: A LITTLE
MOBILITY SCORE: 18
SUGGESTED CMS G CODE MODIFIER MOBILITY: CK
CLIMB 3 TO 5 STEPS WITH RAILING: A LITTLE

## 2025-01-28 ASSESSMENT — GAIT ASSESSMENTS
GAIT LEVEL OF ASSIST: STANDBY ASSIST
ASSISTIVE DEVICE: FRONT WHEEL WALKER
DEVIATION: DECREASED HEEL STRIKE;DECREASED TOE OFF
DISTANCE (FEET): 60

## 2025-01-28 ASSESSMENT — PAIN DESCRIPTION - PAIN TYPE
TYPE: SURGICAL PAIN

## 2025-01-28 ASSESSMENT — FIBROSIS 4 INDEX: FIB4 SCORE: 0.75

## 2025-01-28 ASSESSMENT — PAIN SCALES - GENERAL: PAIN_LEVEL: 5

## 2025-01-28 NOTE — OR NURSING
1220: Patient arrived to phase II from PACU 1 via gurney. Report received from RN. Respirations are spontaneous and unlabored.     1235: Family at bedside.     1400: patient requiring 1L O2 to maintain oxygen above 90%. Patient using IS appropriately to max of 2500 mL.    Home O2 requested and flowsheet filled out. DME paperwork completed by physican for home O2. , Cecelia updated.

## 2025-01-28 NOTE — THERAPY
Physical Therapy   Initial Evaluation     Patient Name: Antonella Frausto  Age:  59 y.o., Sex:  female  Medical Record #: [id number]  Today's Date: 1/28/2025     Precautions  Precautions: Weight Bearing As Tolerated Right Lower Extremity    Assessment  Patient is 59 y.o. female s/p right TKA POD #0.  Pt agreeable to therapy evaluation. Pt is able to ambulate safely with FWW, stair training completed. Pt was provided with education on elevation, icing, positioning, and supine/seated therapeutic exercises. HEP handout issued, pt able to return demo all exercises.  Pt has support at home and has no further acute skilled PT needs at this time. Anticipate pt to d/c home once medically clear with recs for FWW use and OP therapy services.     Plan    Physical Therapy Initial Treatment Plan   Duration: Evaluation only    DC Equipment Recommendations: None  Discharge Recommendations: Recommend outpatient physical therapy services to address higher level deficits        01/28/25 1358   Prior Living Situation   Housing / Facility 1 Story House   Steps Into Home 2   Steps In Home 0   Bathroom Set up Bathtub / Shower Combination   Equipment Owned Front-Wheel Walker;Crutches;Single Point Cane;Tub Transfer Bench;Raised Toilet Seat With Arms   Lives with - Patient's Self Care Capacity Spouse   Comments supportive spouse   Prior Level of Functional Mobility   Bed Mobility Independent   Transfer Status Independent   Ambulation Independent   Assistive Devices Used None   Stairs Independent   Cognition    Comments Oriented x4   Strength Upper Body   Upper Body Strength  WDL   Passive ROM Lower Body   Passive ROM Lower Body X   Comments R knee 0-80 deg   Active ROM Lower Body    Active ROM Lower Body  X   Comments R malissa 0-75 deg   Strength Lower Body   Lower Body Strength  X   Comments R knee limited by pain   Sensation Lower Body   Lower Extremity Sensation   WDL   Balance Assessment   Sitting Balance (Static) Good   Sitting Balance  (Dynamic) Fair +   Standing Balance (Static) Fair +   Standing Balance (Dynamic) Fair   Weight Shift Sitting Good   Weight Shift Standing Fair   Comments stdg with FWW   Bed Mobility    Comments NT, pt sitting up in chair pre and post   Gait Analysis   Gait Level Of Assist Standby Assist   Assistive Device Front Wheel Walker   Distance (Feet) 60   # of Times Distance was Traveled 1   Deviation Decreased Heel Strike;Decreased Toe Off   # of Stairs Climbed 1   Level of Assist with Stairs Contact Guard Assist   Weight Bearing Status WBAT R LE   Functional Mobility   Sit to Stand Standby Assist   Bed, Chair, Wheelchair Transfer Standby Assist   Transfer Method Stand Step

## 2025-01-28 NOTE — ANESTHESIA PROCEDURE NOTES
Peripheral Block    Date/Time: 1/28/2025 9:07 AM    Performed by: Bean Michaud M.D.  Authorized by: Bean Michaud M.D.    Patient Location:  Pre-op  Start Time:  1/28/2025 9:07 AM  Reason for Block: at surgeon's request and post-op pain management ONLY    patient identified, IV checked, site marked, risks and benefits discussed, surgical consent, monitors and equipment checked, pre-op evaluation and timeout performed    Patient Position:  Recumbent  Prep: ChloraPrep    Monitoring:  Heart rate, continuous pulse ox and cardiac monitor  Block Region:  Lower Extremity  Lower Extremity - Block Type:  Selective FEMORAL nerve block at the Adductor Canal    Laterality:  Right  Procedures: ultrasound guided  Image captured, interpreted and electronically stored.  Local Infiltration:  Lidocaine  Strength:  1 %  Dose:  3 ml  Block Type:  Single-shot  Needle Length:  100mm  Needle Gauge:  21 G  Needle Localization:  Ultrasound guidance  Ultrasound picture in chart  Injection Assessment:  Negative aspiration for heme, no paresthesia on injection, incremental injection and local visualized surrounding nerve on ultrasound  Evidence of intravascular injection: No     US Guided Selective Femoral Nerve Block at Adductor Canal:   US probe placed at mid-thigh level on externally rotated leg and femur identified.  Probe directed medially until Sartorius Muscle (SM), Femoral Artery (FA) and Saphenous Nerve (SN) identified in Adductor Canal (AC).  Needle inserted anterolateral to probe in an in plane approach into a subsartorial perivascular perineural position.  After negative aspiration LA injected with ease and visualized spreading within the AC.

## 2025-01-28 NOTE — ANESTHESIA TIME REPORT
Anesthesia Start and Stop Event Times       Date Time Event    1/28/2025 0904 Ready for Procedure     0906 Anesthesia Start          Responsible Staff  01/28/25      Name Role Begin End    Bean Michaud M.D. Anesth 0906           Overtime Reason:  no overtime (within assigned shift)    Comments: nerve block

## 2025-01-28 NOTE — H&P
Surgery Orthopedic History & Physical Note    Date  1/28/2025    Primary Care Physician  KHOI Nieto      Pre-Op Diagnosis Codes:      * Primary osteoarthritis of right knee [M17.11]    HPI  This is a 59 y.o. female who presents for a TKA.    Past Medical History:   Diagnosis Date    Arthritis     FEET & knees    Bronchitis 12/15/2014    on antibiotic    Diabetes (HCC)     prediabetic    High cholesterol     Hypothyroidism     Increased BMI     Pain 12/22/2014    right leg, feet, 4/10    Postmenopausal bleeding 07/25/2022    Prediabetes 02/13/2020    Urinary incontinence     Venous thrombosis 08/16/2018    Superficial blood clot right leg, right groin area       Past Surgical History:   Procedure Laterality Date    HYSTEROSCOPY WITH MYOSURE  11/18/2022    Procedure: HYSTEROSCOPY WITH POLYPECTOMY;  Surgeon: Amarilys James M.D.;  Location: SURGERY SAME DAY [facility];  Service: Gynecology    KNEE ARTHROSCOPY  12/29/2014    Performed by Chas Titus M.D. at SURGERY SAME DAY [facility] ORS    MENISCECTOMY, KNEE, MEDIAL  12/29/2014    Performed by Chas Titus M.D. at SURGERY SAME DAY [facility] ORS    CHONDROPLASTY  12/29/2014    Performed by Chas Titus M.D. at SURGERY SAME DAY [facility] ORS    THYROIDECTOMY TOTAL  10/20/2010    Performed by SHABBIR BROOKE at SURGERY SAME DAY [facility] ORS    NODE BIOPSY  10/20/2010    Performed by SHABBIR BROOKE at SURGERY SAME DAY [facility] ORS    CHOLECYSTECTOMY  1995    GYN SURGERY      D & C with miscarrage        Current Facility-Administered Medications   Medication Dose Route Frequency Provider Last Rate Last Admin    scopolamine (Transderm-Scop) patch 1 Patch  1 Patch Transdermal [id number] Bean Michaud M.D.   1 Patch at 01/28/25 0748    ceFAZolin (Ancef) injection 2 g  2 g Intravenous Once Lucas Salcedo P.A.-C.        lactated ringers infusion   Intravenous Continuous Kvng Villatoro M.D.           Social History     Socioeconomic History    Marital  status:      Spouse name: Shakeel Frausto    Number of children: 3    Years of education: Not on file    Highest education level: Not on file   Occupational History    Not on file   Tobacco Use    Smoking status: Never     Passive exposure: Never    Smokeless tobacco: Never   Vaping Use    Vaping status: Never Used   Substance and Sexual Activity    Alcohol use: No    Drug use: No    Sexual activity: Yes     Partners: Male   Other Topics Concern    Not on file   Social History Narrative    Not on file     Social Drivers of Health     Financial Resource Strain: Not on file   Food Insecurity: Not on file   Transportation Needs: Not on file   Physical Activity: Not on file   Stress: Not on file   Social Connections: Not on file   Intimate Partner Violence: Not on file   Housing Stability: Not on file       Family History   Problem Relation Age of Onset    Cancer Mother         Thyroid ca, lymphoma    Thyroid Mother     Lymphoma Mother     Thyroid cancer Mother     Heart Disease Father     Heart Failure Father     No Known Problems Sister     Heart Failure Brother     Heart Attack Brother     Arthritis Brother         psoriatic    DVT Brother     Psoriasis Brother     Heart Disease Brother     Cancer Maternal Grandmother         Cervical Cancer    No Known Problems Maternal Grandfather     Dementia Paternal Grandmother     Parkinson's Disease Paternal Grandmother     Cancer Paternal Grandfather         Throat and liver    Cervical Cancer Other        Allergies  Asa [aspirin], Codeine, Latex, and Meloxicam    Review of Systems  Negative    Physical Exam  Regular rate and rhythm  Nonlabored breathing  Abdomen soft and nontender   Neurovascular intact distally  Skin is in good condition  Lymphedema is relatively well controlled    Vital Signs  Blood Pressure: (!) 154/70   Temperature: (!) 35.7 °C (96.3 °F)   Pulse: 76   Respiration: 18   Pulse Oximetry: 97 %       Labs:                    Radiology:  No orders to  display         Assessment/Plan:  Pre-Op Diagnosis Codes:      * Primary osteoarthritis of right knee [M17.11]  Procedure(s):  RIGHT TOTAL KNEE ARTHROPLASTY

## 2025-01-28 NOTE — OR NURSING
1040 -Pt to PACU from OR. Report from anesthesia and OR RN. On 8L O2 via mask. Respirations even and unlabored. VSS. Pt waking up on arrival, encouraged to take deep breaths. O2 sat in upper 80's, MD Michaud at bedside. Dressing to rt knee CDI.  Pillow placed under operative lower extremity ankle only, knee of mireya flat.    1113 - Pt medicated for pain per MAR    1119 - Handoff to Nayla ENGLAND    1146 - Pt spouse Rashid updated by telephone.    1215 -  Report given to phase II SAMANTA Farley. Verbalize understanding and all questions answered.     1220 - Patient ambulated to phase II with walker with chart and all personal belongings, escorted by CNA.

## 2025-01-28 NOTE — ANESTHESIA PREPROCEDURE EVALUATION
Case: [id number] Date/Time: 01/28/25 0945    Procedure: RIGHT TOTAL KNEE ARTHROPLASTY    Diagnosis: Primary osteoarthritis of right knee [M17.11]    Pre-op diagnosis: Primary osteoarthritis of right knee [M17.11]    Location: [facility] 06 / SURGERY [facility]    Surgeons: Kvng Villatoro M.D.            Past Medical History:   Diagnosis Date   • Arthritis     FEET & knees   • Bronchitis 12/15/2014    on antibiotic   • Diabetes (HCC)     prediabetic   • High cholesterol    • Hypothyroidism    • Increased BMI    • Pain 12/22/2014    right leg, feet, 4/10   • Postmenopausal bleeding 07/25/2022   • Prediabetes 02/13/2020   • Urinary incontinence    • Venous thrombosis 08/16/2018    Superficial blood clot right leg, right groin area       Past Surgical History:   Procedure Laterality Date   • HYSTEROSCOPY WITH MYOSURE  11/18/2022    Procedure: HYSTEROSCOPY WITH POLYPECTOMY;  Surgeon: Amarilys James M.D.;  Location: SURGERY SAME DAY [facility];  Service: Gynecology   • KNEE ARTHROSCOPY  12/29/2014    Performed by Chas Titus M.D. at SURGERY SAME DAY [facility] ORS   • MENISCECTOMY, KNEE, MEDIAL  12/29/2014    Performed by Chas Titus M.D. at SURGERY SAME DAY [facility] ORS   • CHONDROPLASTY  12/29/2014    Performed by Chas Titus M.D. at SURGERY SAME DAY [facility] ORS   • THYROIDECTOMY TOTAL  10/20/2010    Performed by SHABBIR BROOKE at SURGERY SAME DAY [facility] ORS   • NODE BIOPSY  10/20/2010    Performed by SHABBIR BROOKE at SURGERY SAME DAY [facility] ORS   • CHOLECYSTECTOMY  1995   • GYN SURGERY      D & C with miscarrage        Current Outpatient Medications   Medication Instructions   • acetaminophen (TYLENOL) 1,000 mg, Oral, 3 TIMES DAILY PRN   • aspirin (ASA) 81 mg, Oral, 2 TIMES DAILY   • cetirizine (ZYRTEC) 10 mg, DAILY   • docusate sodium (COLACE) 100 mg, Oral, 2 TIMES DAILY   • Ibuprofen (ADVIL) 600 mg, Oral, 3 times daily   • levothyroxine (SYNTHROID) 150 mcg, Oral, EACH MORNING ON EMPTY STOMACH   •  "Magnesium 200 MG Tab DAILY   • mupirocin (BACTROBAN) 2 % Ointment Swab 2x daily in each nostril for 5 days   • ondansetron (ZOFRAN ODT) 4 mg, Oral, EVERY 6 HOURS PRN   • oxyCODONE immediate-release (ROXICODONE) 5-7.5 mg, Oral, EVERY 6 HOURS PRN   • Oxymetazoline HCl (NASAL SPRAY 12 HOUR NA) PRN   • Potassium 99 MG Tab DAILY   • traMADol (ULTRAM) 50-75 mg, Oral, EVERY 6 HOURS PRN, Max 5 tablet per day   • triamcinolone acetonide (KENALOG) 1 g, Topical, 2 TIMES DAILY PRN   • Vitamin C 500 mg, DAILY   • Vitamin D-Vitamin K (VITAMIN K2-VITAMIN D3 PO) 10,000 Units, DAILY   • Zinc 100 MG Tab DAILY       Social History     Tobacco Use   • Smoking status: Never     Passive exposure: Never   • Smokeless tobacco: Never   Vaping Use   • Vaping status: Never Used   Substance Use Topics   • Alcohol use: No   • Drug use: No       BP (!) 154/70   Pulse 76   Temp (!) 35.7 °C (96.3 °F) (Temporal)   Resp 18   Ht 1.626 m (5' 4\")   Wt 108 kg (237 lb 5.2 oz)   SpO2 97%     Allergies   Allergen Reactions   • Asa [Aspirin] Vomiting and Nausea   • Codeine Vomiting and Nausea   • Latex      \"irritates my skin\"   • Meloxicam Rash     Bruising       Lab Results   Component Value Date/Time    SODIUM 141 01/13/2025 09:35 AM    POTASSIUM 4.2 01/13/2025 09:35 AM    CHLORIDE 106 01/13/2025 09:35 AM    GLUCOSE 88 01/13/2025 09:35 AM    BUN 21 01/13/2025 09:35 AM    CREATININE 0.63 01/13/2025 09:35 AM        Lab Results   Component Value Date/Time    WBC 10.4 01/13/2025 09:35 AM    RBC 4.52 01/13/2025 09:35 AM    HEMOGLOBIN 13.0 01/13/2025 09:35 AM    HEMATOCRIT 40.3 01/13/2025 09:35 AM    PLATELETCT 346 01/13/2025 09:35 AM        Relevant Problems   PULMONARY   (positive) Shortness of breath      ENDO   (positive) Postoperative hypothyroidism       Physical Exam    Airway   Mallampati: II  TM distance: >3 FB  Neck ROM: full       Cardiovascular - normal exam  Rhythm: regular  Rate: normal  (-) murmur     Dental - normal exam           "   Pulmonary - normal exam  Breath sounds clear to auscultation     Abdominal   (+) obese     Neurological - normal exam                 Anesthesia Plan    ASA 3   ASA physical status 3 criteria: morbid obesity - BMI greater than or equal to 40    Plan - general and peripheral nerve block     Peripheral nerve block will be post-op pain control  Airway plan will be LMA    (Adductor canal block)      Induction: intravenous    Postoperative Plan: Postoperative administration of opioids is intended.    Pertinent diagnostic labs and testing reviewed    Informed Consent:    Anesthetic plan and risks discussed with patient.    Use of blood products discussed with: patient whom consented to blood products.     Peripheral nerve block procedure and risks discussed with patient in detail. Risks discussed include, but are not limited to, failed block, prolonged numbness, pain, infection, bleeding, local anesthetic toxicity, and/or nerve damage. Patient was instructed to protect and pad the affected limb and/or area until full return of sensation. All questions answered and patient consents to proceed as planned and discussed.    Anesthetic procedure and risks discussed with patient in detail.  Risks include but are not limited to PONV, pain, sore throat, damage to teeth/lips/gums, aspiration, positioning injury, allergic reaction, vocal cord injury, prolonged intubation, stroke, and/or cardiopulmonary problems up to and including death.  Patient indicates complete understanding. All questions fully answered and they agree to proceed as planned above.

## 2025-01-28 NOTE — ANESTHESIA PROCEDURE NOTES
Airway    Date/Time: 1/28/2025 9:37 AM    Performed by: Bean Michaud M.D.  Authorized by: Bean Michaud M.D.    Location:  OR  Urgency:  Elective  Indications for Airway Management:  Anesthesia      Spontaneous Ventilation: absent    Sedation Level:  Deep  Preoxygenated: Yes    Final Airway Type:  Supraglottic airway  Final Supraglottic Airway:  Standard LMA    SGA Size:  4  Number of Attempts at Approach:  1

## 2025-01-28 NOTE — OP REPORT
Preop Diagnosis: Advanced right knee arthritis with morbid obesity and lymphedema  Postop Diagnosis:  Same  Procedure: Right total knee arthroplasty  Surgeon: Dr. Kvng Villatoro MD  Assistant: Lucas Salcedo PA-C  Anesthesia: Dr. Michaud. General + Adductor canal block  Estimated Blood Loss: 50cc  Drains: None  Complications: None    Implants: [employer] Tri[employer]lon CR Cementless, size 4 femur, size 4 tibia, with a 12 mm CS insert and 35 oval patella.    Indications: Antonella Frausto is a 59 y.o. female who has had severe progressive knee pain that failed conservative management.  There were severe degenerative changes on radiographs.  We discussed the options and she was ultimately indicated for knee replacement.  We discussed the risk of bleeding, transfusion, pain, neurovascular injury, stiffness, fracture, infection, wound complication, loosening of the parts over time, blood clots, and medical complication.  No guarantees were made and she elected to proceed.    Pertinent Findings and Releases: There were severe degenerative changes throughout, worst medially.  She had some contained subchondral cysts medially with very sclerotic supportive bone around them.  I debrided and autografted the cysts and used an ingrowth implant, which has been shown to have perhaps improved survivorship in morbidly obese patients.  This had an excellent fit.  At the end of the case, the knee easily came to full extension and flexed up to calf/thigh impingement with the arthrotomy closed.  The patella tracked centrally.    Informed consent and site marking were confirmed in preop.  An adductor canal block had been performed by the anesthesiologist, and then she was brought back to the OR where anesthesia was performed. Supine positioning was perfmored with all bony prominences well padded with a padded tourniquet on the thigh.  The extremity was prepped and draped in the usual sterile fashion. I performed a pre-procedure timeout to confirm  we had the correct patient, side, site, procedure, and presence of necessary personal and equipment.  I confirmed that Ancef and tranexamic acid were given.  The tourniquet was then inflated.    A midline incision was made medial to the tibial tubercle centered over patella taken down to the deep capsule of the knee. A medial parapatellar arthrotomy was performed.  The fat pad was released. The patella was subluxated and the knee was flexed. The remnants of the anterior horn of the medial and lateral meniscus were removed as well as the ACL from the intercondylar notch. All distal protruding osteophytes were removed. I then drilled and accessed the intramedullary canal of the femur, lavaged it and placed the intramedullary cutting guide. The distal femur was cut in 5 degrees of valgus. I then sized the femur and based rotation off of bony landmarks.  All distal femoral cuts were made.  The tibia was then subluxated forward and cut perpendicular to its long axis.  A spacer block was placed in the knee extension to confirm I had resected enough bone and then sequential releases were performed until there was a rectangular gap.  Collaterals were intact and overall limb alignment was checked and appropriate.  The knee was then tensed at 90 degrees and the meniscal remnants and posterior osteophytes were removed.  The posterior capsule was injected with a local anesthetic cocktail.  The tibia was then subluxed forward, sized, and punched in the appropriate amount of external rotation and mechanical alignment was verified using a drop jose d. Trials were placed, the knee was reduced with a trial insert, it was taken through a range of motion, and found to be stable in the varus/valgus plane 0-30 degrees of flexion and the AP plane at 90 degrees of flexion. Attention was then turned to the patella. A symmetric resection was performed to recreate native patella height and appropriately sized. All extraneous osteophyte and  synovium were removed.  With a trial in place, the patella tracked centrally using the no thumbs technique. All trial components were removed. The real components were impacted with a great press-fit.  The tourniquet was let down and hemostasis ensured. The real insert was placed. Stability and patellar tracking were excellent. The knee was then copiously irrigated. Two grams of Vancomycin were left in the wound.  The arthrotomy was closed with number 2 running barbed suture, and the wound was closed in layers. An occlusive silver dressing was applied and the patient was turned over to anesthesia in stable condition without any apparent intraoperative complications.    Plan:  WBAT with a walker (which will need to be provided if they do not already have one due to weakness, imbalance, and fall risk), PT/OT evaluation, Aspirin 81mg BID for 4 weeks for DVT prophylaxis, Leave dressing in place until followup.

## 2025-01-28 NOTE — LETTER
December 6, 2024    Patient Name: Antonella Frausto  Surgeon Name: Kvng Villatoro M.D.  Surgery Facility: [facility] ([address] [place])  Surgery Date: 1/28/2025    The time of your surgery is not final and may change up to and until the day of your surgery. You will be contacted 24-48 hours prior to your surgery date with your check-in and surgery time.    If you will not be at one of the below numbers please call the surgery scheduler at [phone]  Preferred Phone: [phone]    BEFORE YOUR SURGERY   Pre Registration and/or Lab Work must be done within and no earlier than 28 days prior to your surgery date. Your scheduled facility will contact you regarding all required preregistration and/or lab work. If you have not been contacted within 7 days of your scheduled procedure please call [facility] at [phone] for an appointment as soon as possible.    DAY OF YOUR SURGERY  Nothing to eat or drink after midnight     Refrain from smoking any substance after midnight prior to surgery. Smoking may interfere with the anesthetic and frequently produces nausea during the recovery period.    Continue taking all lifesaving medications. Including the morning of your surgery with small sip of water.    Please do NOT take on the day of surgery:  Diuretics: examples- furosemide (Lasix), spironolactone, hydrochlorothiazide  ACE-inhibitors: examples- lisinopril, ramipril, enalapril  “ARBs”: examples- losartan, Olmesartan, valsartan    Please arrive at the hospital/surgery center at the check-in time provided.     An adult will need to bring you and take you home after your surgery.     AFTER YOUR SURGERY  Post op Appointment:   Date: 02/12/2025   Time: 11:00AM   With: Lucas Salcedo PA-C   Location: [address] [place] [address] [place] [address]    - Therapy- Your first appointment should be 4-5  day(s) after your surgery. For your convenience we have 4 Physical Therapy  locations: [facility], [place], and [facility]. Call our office ASAP to schedule an appointment at [phone] or take the enclosed Therapy Prescription to a facility of your choice.  - No dental work for 3-6 months after your surgery.  - Post Surgery - You will need someone to drive you home  - Post Surgery - You will need someone to stay with you for 24 hours  - You must have someone provide transportation post surgery and someone to monitor you for at least 24 hours post-surgery. If you don't have either of these your appointment will be canceled.     TIME OFF WORK  FMLA or Disability forms can be faxed directly to: [phone] or you may drop them off at [address] [place] [address]. Our office charges a $35.00 fee per form. Forms will be completed within 10 business days of drop off and payment received. For the status of your forms you may contact our disability office directly [phone].    MEDICATION INSTRUCTIONS **Please read section completely**  The following medications should be stopped a minimum of 10 days prior to surgery:  All over the counter, Supplements & Herbal medications    Anorectics: Phentermine (Adipex-P, Lomaira and Suprenza), Phentermine-topiramate (Qsymia), Bupropion-naltrexone (Contrave)    **If you are on Bupropion for anxiety/depression, please continue this medication up until the day of surgery.     Opiod Partial Agonists/Opioid Antagonists: Buprenorphine (Suboxone, Belbuca, Butrans, Probuphine Implant, Sublocade), Naltrexone (ReVia, Vivitrol), Naloxone    Amphetamines: Dextroamphetamine/Amphetamine (Adderall, Mydayis), Methylphenidate Hydrochloride (Concerta, Metadate, Methylin, Ritalin)    The following medications should be stopped 5 days prior to surgery:  Blood Thinners: Any Aspirin, Aspirin products, anti-inflammatories such as ibuprofen and any blood thinners such as Coumadin and Plavix. Please consult your prescribing physician if you are on life  saving blood thinners, in regards to when to stop medications prior to surgery.     The following medications should be stopped a minimum of 3 days prior to surgery:  PDE-5 inhibitors: Sildenafil (Viagra), Tadalafil (Cialis), Vardenafil (Levitra), Avanafil (Stendra)    MAO Inhibitors: Rasagiline (Azilect), Selegiline (Eldepryl, Emsam, Selapar), Isocarboxazid (Marplan), Phenelzine (Nardil)       =

## 2025-01-28 NOTE — ANESTHESIA POSTPROCEDURE EVALUATION
Patient: Antonella Frausto    Procedure Summary       Date: 01/28/25 Room / Location:  OR 06 / SURGERY [facility]    Anesthesia Start: 0906 Anesthesia Stop:     Procedure: RIGHT TOTAL KNEE ARTHROPLASTY (Right: Knee) Diagnosis:       Primary osteoarthritis of right knee      (Primary osteoarthritis of right knee [M17.11])    Surgeons: Kvng Villatoro M.D. Responsible Provider: Bean Michaud M.D.    Anesthesia Type: general, peripheral nerve block ASA Status: 3            Final Anesthesia Type: general, peripheral nerve block  Last vitals  BP   Blood Pressure: 127/69    Temp   36.2 °C (97.2 °F)    Pulse   68   Resp   15    SpO2   94 %      Anesthesia Post Evaluation    Patient location during evaluation: PACU  Patient participation: complete - patient participated  Level of consciousness: sleepy but conscious  Pain score: 5    Airway patency: patent  Anesthetic complications: no  Cardiovascular status: hemodynamically stable  Respiratory status: acceptable  Hydration status: balanced    PONV: none          No notable events documented.     Nurse Pain Score: 5 (NPRS)

## 2025-01-28 NOTE — OR NURSING
Pt allergies and NPO status verified. Home medications reconciled, preferred pharmacy verified. Belongings secured in locker. Pt verbalizes understanding of pain scale, expected course of stay, and plan of care. Surgical site verified with pt and MD. Triple aim completed. IV access established. All questions answered. Bed in lowest position, call light within reach.

## 2025-01-29 NOTE — OR NURSING
1700: Told by [facility] rep that a tank of Oxygen and concentrator would be delivered to the hospital and a  would be on route to be here.    1730: Call made to [facility] for update on 's status. Placed on  hold and the rep hung up at 20 minutes of being on hold.    1750: Immediate call back to [facility] for update. Placed on hold to find out where  is.    1805:  is unable to be reached per [facility] rep. They will continue to contact the  every 15 minutes per the [facility] rep.

## 2025-01-29 NOTE — DISCHARGE PLANNING
Anticipated Discharge Disposition: home with o2    Action: voalte from [facility] that they are unable to reach  ER CM spoke with Sean Falcon. He was able to reach out to . ETA 20-30 minute. ER PAR will assist or call ER CM /Security to assist  to get to PACU for delivery.     Barriers to Discharge:  Delivery    Plan: Will cont to follow

## 2025-01-29 NOTE — OR NURSING
1700: Told by [facility] rep that a tank of Oxygen and concentrator would be delivered to the hospital and a  would be on route to be here.    1730: Call made to [facility] for update on 's status. Placed on  hold and the rep hung up at 20 minutes of being on hold.    1750: Immediate call back to [facility] for update. Placed on hold to find out where  is.    1805:  is unable to be reached per [facility] rep. They will continue to contact the  every 15 minutes per the [facility] rep.    1815: Update received from [facility] [place] Case Manager that the  would be at the hospital in 20-30 minutes.    1840: [facility]  arrived with Oxygen and Concentrator.    1857: IV removed. Discharge instructions discussed with patient. Patient taken to responsible adult and car.

## 2025-01-29 NOTE — DISCHARGE PLANNING
Received Choice Form at: 8258 1/28/25   Agency/Facility Name:  [facility]  Outcome: manual fax sent by CM RN ED

## 2025-01-29 NOTE — DISCHARGE PLANNING
Anticipated Discharge Disposition: O2 for home     Action: O2 order faxed .Choice form done with pt 1 [facility] 2 Preferred 3 [facility]. Spoke with Yennifer at [facility]. Faxed orders by hand to [phone]  and reviewed insurance with her to michelle Gave her PACU RN cell [phone] ER CM cell and desk numbers and will follow up shortly for approval and delivery details    Barriers to Discharge: Delivery pending    Plan: Will cont to follow

## 2025-01-29 NOTE — DISCHARGE PLANNING
Anticipated Discharge Disposition: home when o2 delivered    Action: Refaxed orders with OR and [facility] again. Called to PACU they have heard from [facility]. Spoke with Carli. Order put thru pending . They are currently paging .    Barriers to Discharge: Delivery pending.    Plan: Cont to follow

## 2025-01-29 NOTE — DISCHARGE PLANNING
Anticipated Discharge Disposition: Oxygen    Action: Call to [facility] and spoke with Silvia. Request call back  to check status of orders, ensure received and in process.    Barriers to Discharge: After hrs    Plan: Will cont to follow

## 2025-02-11 ENCOUNTER — OFFICE VISIT (OUTPATIENT)
Dept: URGENT CARE | Facility: PHYSICIAN GROUP | Age: 60
End: 2025-02-11
Payer: COMMERCIAL

## 2025-02-11 VITALS
HEIGHT: 64 IN | HEART RATE: 81 BPM | WEIGHT: 230 LBS | TEMPERATURE: 97.9 F | BODY MASS INDEX: 39.27 KG/M2 | SYSTOLIC BLOOD PRESSURE: 112 MMHG | DIASTOLIC BLOOD PRESSURE: 64 MMHG | RESPIRATION RATE: 16 BRPM | OXYGEN SATURATION: 96 %

## 2025-02-11 DIAGNOSIS — R06.02 SHORTNESS OF BREATH: ICD-10-CM

## 2025-02-11 DIAGNOSIS — J06.9 VIRAL URI WITH COUGH: ICD-10-CM

## 2025-02-11 LAB
FLUAV RNA SPEC QL NAA+PROBE: NEGATIVE
FLUBV RNA SPEC QL NAA+PROBE: NEGATIVE
RSV RNA SPEC QL NAA+PROBE: NEGATIVE
SARS-COV-2 RNA RESP QL NAA+PROBE: NEGATIVE

## 2025-02-11 PROCEDURE — 99214 OFFICE O/P EST MOD 30 MIN: CPT

## 2025-02-11 PROCEDURE — 3078F DIAST BP <80 MM HG: CPT

## 2025-02-11 PROCEDURE — 0241U POCT CEPHEID COV-2, FLU A/B, RSV - PCR: CPT

## 2025-02-11 PROCEDURE — 3074F SYST BP LT 130 MM HG: CPT

## 2025-02-11 RX ORDER — BENZONATATE 100 MG/1
100 CAPSULE ORAL 3 TIMES DAILY PRN
Qty: 30 CAPSULE | Refills: 0 | Status: SHIPPED | OUTPATIENT
Start: 2025-02-11 | End: 2025-02-21

## 2025-02-11 RX ORDER — FLUTICASONE PROPIONATE 50 MCG
1 SPRAY, SUSPENSION (ML) NASAL DAILY
Qty: 16 G | Refills: 0 | Status: SHIPPED | OUTPATIENT
Start: 2025-02-11

## 2025-02-11 ASSESSMENT — ENCOUNTER SYMPTOMS
WHEEZING: 0
DIARRHEA: 0
COUGH: 1
CHILLS: 0
EYE DISCHARGE: 0
NECK PAIN: 0
HEADACHES: 0
EYE REDNESS: 0
SINUS PAIN: 0
FEVER: 0
STRIDOR: 0
SORE THROAT: 1
VOMITING: 0
NAUSEA: 0
SHORTNESS OF BREATH: 0
EYE PAIN: 0
ABDOMINAL PAIN: 0
SPUTUM PRODUCTION: 0

## 2025-02-11 ASSESSMENT — FIBROSIS 4 INDEX: FIB4 SCORE: 0.75

## 2025-02-11 NOTE — PROGRESS NOTES
Subjective:     Chief Complaint   Patient presents with    Cough     X 2 days        HPI:  Antonella Frausto is a 59 y.o. female who presents for symptoms which started 2 days ago. Pt reports a cough, nasal congestion, scratchy throat, and fatigue. Denies chest pain or wheezing. Report mild exacerbation of chronic SOB. Report recent total knee replacement. Denies h/o asthma/copd/CAP. No immunocompromise. Has tried OTC cold medications without significant relief of symptoms. No recent ABX use. No other aggravating or alleviating factors. Denies known exposure to COVID-19.    ROS:  Review of Systems   Constitutional:  Positive for malaise/fatigue. Negative for chills and fever.   HENT:  Positive for congestion and sore throat. Negative for ear discharge, ear pain, hearing loss and sinus pain.    Eyes:  Negative for pain, discharge and redness.   Respiratory:  Positive for cough. Negative for sputum production, shortness of breath, wheezing and stridor.    Cardiovascular:  Negative for chest pain.   Gastrointestinal:  Negative for abdominal pain, diarrhea, nausea and vomiting.   Genitourinary:  Negative for dysuria.   Musculoskeletal:  Negative for neck pain.   Skin:  Negative for rash.   Neurological:  Negative for headaches.        CURRENT MEDICATIONS:  Current Outpatient Medications   Medication Sig Refill Last Dispense    acetaminophen (TYLENOL) 500 MG Tab Take 2 Tablets by mouth 3 times a day as needed for Mild Pain or Moderate Pain for up to 30 days. 0 Unknown (no pharmacy)    Ascorbic Acid (VITAMIN C) 1000 MG Tab Take 0.5 Tablets by mouth every day.  Unknown (patient-reported)    aspirin (ASA) 81 MG Chew Tab chewable tablet Chew 1 Tablet 2 times a day for 28 days. 0 Unknown (no pharmacy)    benzonatate (TESSALON) 100 MG Cap Take 1 Capsule by mouth 3 times a day as needed for Cough for up to 10 days. 0 Unknown (outside pharmacy)    cetirizine (ZYRTEC) 10 MG TABS Take 1 Tablet by mouth every day.  Unknown  "(patient-reported)    docusate sodium (COLACE) 100 MG Cap Take 1 Capsule by mouth 2 times a day for 30 days. 0 Unknown (outside pharmacy)    doxycycline (VIBRAMYCIN) 100 MG Tab Take 1 Tablet by mouth 2 times a day for 30 days. 0 Unknown (outside pharmacy)    fluticasone (FLONASE) 50 MCG/ACT nasal spray Administer 1 Spray into affected nostril(S) every day. 0 Unknown (outside pharmacy)    Ibuprofen (ADVIL) 200 MG Cap Take 3 Capsules by mouth in the morning, at noon, and at bedtime for 30 days. 0 Unknown (no pharmacy)    levothyroxine (SYNTHROID) 150 MCG Tab TAKE 1 TABLET BY MOUTH EVERY MORNING ON AN EMPTY STOMACH 1 Unknown (outside pharmacy)    Magnesium 200 MG Tab Take  by mouth every day.  Unknown (patient-reported)    mupirocin (BACTROBAN) 2 % Ointment Swab 2x daily in each nostril for 5 days 0 Unknown (outside pharmacy)    ondansetron (ZOFRAN ODT) 4 MG TABLET DISPERSIBLE Take 1 Tablet by mouth every 6 hours as needed for Nausea/Vomiting. 1 Unknown (outside pharmacy)    Oxymetazoline HCl (NASAL SPRAY 12 HOUR NA) Administer  into affected nostril(S) as needed.  Unknown (patient-reported)    Potassium 99 MG Tab Take  by mouth every day.  Unknown (patient-reported)    traMADol (ULTRAM) 50 MG Tab Take 1 Tablet by mouth every 6 hours as needed for Severe Pain for up to 7 days. 0 Unknown (outside pharmacy)    triamcinolone acetonide (KENALOG) 0.5 % Cream Apply 1 g topically 2 times a day as needed (eczema). 6 Unknown (outside pharmacy)    Vitamin D-Vitamin K (VITAMIN K2-VITAMIN D3 PO) Take 10,000 Units by mouth every day.  Unknown (patient-reported)    Zinc 100 MG Tab Take  by mouth every day.  Unknown (patient-reported)       ALLERGIES:   Allergies   Allergen Reactions    Asa [Aspirin] Vomiting and Nausea    Codeine Vomiting and Nausea    Latex      \"irritates my skin\"    Meloxicam Rash     Bruising       PROBLEM LIST:    does not have any pertinent problems on file.    Allergies, Medications, & Tobacco/Substance Use " "were reconciled by the Medical Assistant and reviewed by myself.     Objective:   /64   Pulse 81   Temp 36.6 °C (97.9 °F)   Resp 16   Ht 1.626 m (5' 4\")   Wt 104 kg (230 lb)   LMP 07/17/2012 (Within Years)   SpO2 96%   BMI 39.48 kg/m²     Physical Exam  Constitutional:       General: She is not in acute distress.     Appearance: She is not ill-appearing or toxic-appearing.   HENT:      Right Ear: Tympanic membrane normal. Tympanic membrane is not erythematous or bulging.      Left Ear: Tympanic membrane normal. Tympanic membrane is not erythematous or bulging.      Nose: No congestion or rhinorrhea.      Right Sinus: No maxillary sinus tenderness or frontal sinus tenderness.      Left Sinus: No maxillary sinus tenderness or frontal sinus tenderness.      Mouth/Throat:      Mouth: Mucous membranes are moist.      Pharynx: Oropharynx is clear. Posterior oropharyngeal erythema present. No oropharyngeal exudate.      Tonsils: No tonsillar exudate. 1+ on the right. 1+ on the left.   Eyes:      Pupils: Pupils are equal, round, and reactive to light.   Cardiovascular:      Rate and Rhythm: Normal rate and regular rhythm.      Pulses: Normal pulses.      Heart sounds: Normal heart sounds.   Pulmonary:      Effort: Pulmonary effort is normal. No respiratory distress.      Breath sounds: Normal breath sounds. No wheezing, rhonchi or rales.   Abdominal:      General: Abdomen is flat. Bowel sounds are normal. There is no distension.      Tenderness: There is no abdominal tenderness.   Musculoskeletal:      Cervical back: No tenderness.   Lymphadenopathy:      Cervical: No cervical adenopathy.   Skin:     General: Skin is warm and dry.      Findings: No rash.   Neurological:      Mental Status: She is alert.       Assessment/Plan:   Pt's history and physical exam consistent with viral URI with cough. Patient has stable vital signs and is non-toxic appearing.  Pt has chronic SOB which is exacerbated by this viral " illness. Viral testing performed in office with negative results.  Patient informed of results via [employer].  Discussed symptoms are likely secondary to a viral illness.  Discussed supportive care measures and maintaining adequate hydration. Patient demonstrated understanding of treatment plan and agreed to return to the clinic if symptoms worsen or fail to resolve.     Assessment & Plan  Viral URI with cough  Orders:    POCT CEPHEID COV-2, FLU A/B, RSV - PCR    benzonatate (TESSALON) 100 MG Cap; Take 1 Capsule by mouth 3 times a day as needed for Cough for up to 10 days.    fluticasone (FLONASE) 50 MCG/ACT nasal spray; Administer 1 Spray into affected nostril(S) every day.  - Discussed symptoms most likely viral and self limiting illness. No evidence of a bacterial process.   - Encouraged pt to treat symptoms by using humidified air, salt water gargles, and/or sipping warm liquids.   - Educated pt on use of OTC tylenol or ibuprofen (if no contraindications) per package instructions for body aches, headaches, pain from sore throat. Discussed OTC topical analgesic spray or lozenges. Discussed nonsedating antihistamine like Zyrtec (cetirizine) or Allegra (fexofenadine) to reduce the amount of nasal inflammation.  - Pt encouraged to practice hand hygiene, wear a face mask in public or self isolate, remain well hydrated, and get sufficient rest/sleep.     Shortness of breath  - monitor for worsening symptoms and return if low O2 or persistent SOB       Discussed differential diagnosis, management options, risks/benefits, and alternatives to planned treatment. Pt expressed understanding and the treatment plan was agreed upon. Questions were encouraged and answered. Pt encouraged to return to urgent care as needed if new or worsening symptoms or if there is no improvement in condition. Pt educated in red flags and indications to immediately call 911 or present to the Emergency Department. Advised the patient to follow-up  with the primary care physician for recheck, reevaluation, and further management.    I personally reviewed prior external notes and test results pertinent to today's visit. I have independently reviewed and interpreted all diagnostics ordered during this visit.    Please note that this dictation was created using voice recognition software. I have made a reasonable attempt to correct obvious errors, but I expect that there are errors of grammar and possibly content that I did not discover before finalizing the note.    This note was electronically signed by NARDA Chu

## 2025-02-19 ENCOUNTER — PHYSICAL THERAPY (OUTPATIENT)
Dept: PHYSICAL THERAPY | Facility: MEDICAL CENTER | Age: 60
End: 2025-02-19
Attending: NURSE PRACTITIONER
Payer: COMMERCIAL

## 2025-02-19 DIAGNOSIS — Z96.651 STATUS POST RIGHT KNEE REPLACEMENT: ICD-10-CM

## 2025-02-19 DIAGNOSIS — R60.0 BILATERAL LOWER EXTREMITY EDEMA: ICD-10-CM

## 2025-02-19 DIAGNOSIS — M25.661 KNEE STIFFNESS, RIGHT: ICD-10-CM

## 2025-02-19 PROCEDURE — 97140 MANUAL THERAPY 1/> REGIONS: CPT

## 2025-02-19 SDOH — ECONOMIC STABILITY: GENERAL: QUALITY OF LIFE: GOOD

## 2025-02-19 ASSESSMENT — ENCOUNTER SYMPTOMS
PAIN SCALE AT HIGHEST: 6
PAIN TIMING: ALL DAY
EXACERBATED BY: STANDING
PAIN SCALE: 3
PAIN SCALE AT LOWEST: 3
ALLEVIATING FACTORS: NOTHING
PAIN LOCATION: RIGHT KNEE STIFFNESS
EXACERBATED BY: WALKING

## 2025-02-20 NOTE — OP THERAPY PROGRESS SUMMARY
Outpatient Physical Therapy  LYMPHEDEMA THERAPY PROGRESS SUMMARY NOTE    [facility] Outpatient Physical Therapy  [address]  [place] [address]  Phone:  [phone]  Fax:  [phone]    Date of Visit: 2025    Patient: Antonella Frausto  YOB: 1965  MRN: [id number]     Referring Provider: KHOI Nieto  [address]  [facility] [place]  [place] [address]   Referring Diagnosis Localized edema [R60.0]     Visit #: Visit count could not be calculated. Make sure you are using a visit which is associated with an episode.    Progress Report Period: 24-25    Time Calculation    Start time: 1700  Stop time: 1739 Time Calculation (min): 39 minutes         Chief Complaint: Lymphedema Aquired and Knee Problem    Visit Diagnoses     ICD-10-CM   1. Status post right knee replacement  Z96.651   2. Knee stiffness, right  M25.661   3. Bilateral lower extremity edema  R60.0       Subjective:   History of Present Illness:     Mechanism of injury:  Patient undwerwent a R TKA on 25. She had considerable post op pain in the first week, but now feels that her pain is manageable and is driving again. She is doing well with her orthopedic based PT and is adhering to her icing and elevation program. She has resumed using her pump, but has not yet resume wearing compression onto her RLE  Quality of life:  Good  Headaches:  no headaches  Sleep disturbance:  Not disrupted  Pain:     Current pain rating:  3    At best pain rating:  3    At worst pain ratin    Location:  Right knee stiffness    Pain timing:  All day    Relieving factors:  Nothing    Aggravating factors:  Walking and standing    Progression:  Unchanged  Social Support:     Lives in:  One-story house    Lives with:  Spouse  Treatments:     None    Patient Goals:     Patient goals for therapy:  Decreased edema and decreased pain      Lymphedema Objective    Visit type  Lipedema and  Lipolymphedema    Postural Observation  Seated posture is fair  Standing posture is fair  Correction of posture has no consistent effect      Skin Appearance    moderate edema, pitting edema,  .    Sensation      Left Lower Extremity    Light tough is Impaired    Right Lower Extremity    Light touch is Impaired    Sensation Comments  Hypersensitivity  Left Lower Extremity Circumferential Measurements  Waist: 107.1 cm  Hip: 139.4 cm  Scrotum: cm  Ground/Upper Thigh: 74.9 cm  Mid Thigh: 60.6 cm  Knee: 46.5 cm  Upper Calf: 55.6 cm  Mid Calf: 52.7 cm  Ankle: 39.1 cm  Heel to Foot: 24.2 cm  Total: 600.1 cm    Right Lower Extremity Circumferential Measurements  Waist: 107.1 cm  Hip: 139.4 cm  Scrotum: cm  Ground/Upper Thigh: 75.9 cm  Mid Thigh: 60.4 cm  Knee: 48.5 cm  Upper Calf: 60.6 cm  Mid Calf: 54.2 cm  Ankle: 39.4 cm  Heel to Foot: 24.1 cm  Total: 609.6 cm    Lymphedema Stage  Stage 2 Lymphedema    Other Notes  L ankle - 31.5 cm  L Calf 55.1 cm  Left knee 50.3 cm  Length -49.4 cm  Left lower thigh - 56.4 cm  Left upper thigh - 57.8 cm  R ankle - 30.7 cm  R Calf 53.2 cm  R  knee 51.9 cm  Length - 34.1 cm  Right lower thigh - 57.6 cm  Right upper thigh - 62.1 cm        Therapeutic Treatments and Modalities:     2. Manual Therapy (CPT 07983)    Therapeutic Treatment and Modalities Summary: Short neck sequence, abdominal sequence, establishment of inguinla to inguinal anastamoses, establishment of inguinal to axillary anastamoses, decongestion of the bilateral lower extremities from proximal to distal with an emphasis on medial to lateral drainage    KT Tape Two fanstrips anchored on either side of the knee crossing midline to create basketweave    The purpose of Manual Lymph Drainage (MLD) is to reduce lymph volume in the affected limb by increasing intake of lymphatic load into the lymphatic system, increasing the volume of transported lymph fluid, moving lymph fluid in superficial lymph vessels to collateral lymph  collectors, anastomoses, or tissue channels, and increasing venous return. The goal of MLD is to re-route the lymph flow around blocked areas into more centrally located healthy lymph vessels, which drain into the venous system.        Time-based treatments/modalities:    Physical Therapy Timed Treatment Charges  Manual therapy minutes (CPT 91541): 39 minutes      Assessment and Plan:   Assessment details:  Thank you for referring Antonella to physical therapy. She has attended 6 sessions with 0 cancellations/no shows since date of last progress report on 11/27/24. Treatment has consisted of patient education with regards to the lymphatic system, manual lymphatic drainage, compression bandaging, manual therapies to increase soft tissue and joint restriction, compression garments, therapeutic exercise to improve musculoskeletal strength and mobility, and lymphedema risk reduction strategies. Since last PN she has undergone a R TKA on 1/28/25. She is currently in orthopedic based physical therapy to address her ROM and strength limitations. She has resumed using her pneumatic compression pump, but has not yet resumed wearing her in elastic compression garments. There is an 8-10 cm increase in lower extremity swelling post operatively and I would like to see her get back into these garments as soon as possible as those with prolonged swelling post TKA have poorer outcomes. Patient would continue to benefit from skilled physical therapy intervention to address the above mentioned impairments and prevent further functional decline. If you have any questions involving this plan of care please feel free to contact our clinic at [phone]. Thank you for the opportunity to work with Antonella.     Barriers to therapy:  Comorbidities  Prognosis: fair      Goals:   Short Term Goals:  1 - Patient will be able to tolerate wearing reduction kit to bilateral lower extremities for 12/24 hours (GOAL MET)  2 - Patient will have no less  than a 2 cm reduction in limb volume bilaterally (GOAL MET)  3 - Patient will be independent in HEP targeted for lymphatic fluid reduction (GOAL MET)  NEW GOALS 2/19/25  1 - Patient will resume wearing of her in elastic compression garments for 12/24 hours days per week to reduce lymphatic load  2 - Patient will resume use of her pneumatic compression pump for 5/7 days per week to reduce lymphatic load and promote lymphatic mobilization   3 - Patient will have no less than a 3 cm reduction in limb volume to facilitate lymphatic load clearance and mobility.     Short term goal time span:  4-6 weeks    Long Term Goals:  NEW GOALS  1 - Patient will be independent with maintenance phase management of lymphedema including: compression garments, skin care education, risk reduction strategies, manual lymphatic drainage, and therapeutic exercise   2 - Patient will have no less than a 5 cm reduction in limb volume bilaterally  3 - Patient will be able to ambulate 200 feet with the use of a SPC while wearing in elastic compression garmenst    Long term goal time span:  2-4 months    Plan:  Therapy options:  Physical therapy treatment to continue  Planned therapy interventions:  Addressing equipment needs, compression bandaging, compression stocking, decongestive exercises, home exercise program, intermittent compression, manual lymph drainage, Velcro wraps, referral for compression garment & instructions for don/doffing, patient education and range of motion exercises  Planned education:  Functional anatomy and physiology of the lymphatic system, pathophysiology of lymphedema, lymphedema exercise, proper skin care/nutrition, lymphedema precautions, compression bandaging, self massage, long term self-management of lymphedema, home pump use and activity guidelines  Frequency:  1x week  Duration in weeks:  12  Duration in visits:  12  Discussed with:  Patient      Functional Assessment Used        Referring provider  co-signature:  I have reviewed this plan of care and my co-signature certifies the need for services.    Certification Period: 02/19/2025 to 04/20/25    Physician Signature: ________________________________ Date: ______________

## 2025-02-20 NOTE — OP THERAPY DAILY TREATMENT
Outpatient Physical Therapy  LYMPHEDEMA THERAPY DAILY TREATMENT     [facility] Outpatient Physical Therapy  [address]  [place] [address]  Phone:  [phone]  Fax:  [phone]    Date: 02/19/2025    Patient: Antonella Frausto  YOB: 1965  MRN: [id number]     Time Calculation    Start time: 1700  Stop time: 1739 Time Calculation (min): 39 minutes         Chief Complaint: Lymphedema Aquired and Knee Problem    Visit #: 14    Subjective:   History of Present Illness:     Mechanism of injury:  SEE PN      Lymphedema Objective      Exercises/Treatment  Time-based treatments/modalities:    Physical Therapy Timed Treatment Charges  Manual therapy minutes (CPT 65224): 39 minutes      Assessment and Plan:   Assessment details:  SEE PN

## 2025-02-27 ENCOUNTER — APPOINTMENT (OUTPATIENT)
Dept: URBAN - METROPOLITAN AREA CLINIC 6 | Facility: CLINIC | Age: 60
Setting detail: DERMATOLOGY
End: 2025-02-27

## 2025-02-27 DIAGNOSIS — L71.8 OTHER ROSACEA: ICD-10-CM | Status: WELL CONTROLLED

## 2025-02-27 PROCEDURE — ? PRESCRIPTION MEDICATION MANAGEMENT

## 2025-02-27 PROCEDURE — 99213 OFFICE O/P EST LOW 20 MIN: CPT

## 2025-02-27 PROCEDURE — ? PHOTO-DOCUMENTATION

## 2025-02-27 PROCEDURE — ? COUNSELING

## 2025-02-27 ASSESSMENT — LOCATION ZONE DERM
LOCATION ZONE: NOSE
LOCATION ZONE: FACE

## 2025-02-27 ASSESSMENT — LOCATION SIMPLE DESCRIPTION DERM
LOCATION SIMPLE: RIGHT CHEEK
LOCATION SIMPLE: LEFT CHEEK
LOCATION SIMPLE: NOSE

## 2025-02-27 ASSESSMENT — SEVERITY ASSESSMENT OVERALL AMONG ALL PATIENTS
IN YOUR EXPERIENCE, AMONG ALL PATIENTS YOU HAVE SEEN WITH THIS CONDITION, HOW SEVERE IS THIS PATIENT'S CONDITION?: MINIMAL

## 2025-02-27 NOTE — PROCEDURE: PRESCRIPTION MEDICATION MANAGEMENT
Detail Level: Zone
Render In Strict Bullet Format?: No
Modify Regimen: Advised to wash face once daily with Dr. Ornelas Sulfur Bar (10%) in addition to regular use of physical tinted sunscreen.
Continue Regimen: Ivermectin 1% cream QD

## 2025-03-05 ENCOUNTER — PHYSICAL THERAPY (OUTPATIENT)
Dept: PHYSICAL THERAPY | Facility: MEDICAL CENTER | Age: 60
End: 2025-03-05
Attending: NURSE PRACTITIONER
Payer: COMMERCIAL

## 2025-03-05 DIAGNOSIS — R60.0 BILATERAL LOWER EXTREMITY EDEMA: ICD-10-CM

## 2025-03-05 DIAGNOSIS — Z96.651 STATUS POST RIGHT KNEE REPLACEMENT: ICD-10-CM

## 2025-03-05 PROCEDURE — 97140 MANUAL THERAPY 1/> REGIONS: CPT

## 2025-03-05 PROCEDURE — 97530 THERAPEUTIC ACTIVITIES: CPT

## 2025-03-05 SDOH — ECONOMIC STABILITY: GENERAL: QUALITY OF LIFE: GOOD

## 2025-03-05 ASSESSMENT — ENCOUNTER SYMPTOMS
PAIN TIMING: ALL DAY
ALLEVIATING FACTORS: NOTHING
EXACERBATED BY: WALKING
EXACERBATED BY: STANDING
PAIN SCALE AT HIGHEST: 6
PAIN SCALE AT LOWEST: 3
PAIN SCALE: 3
PAIN LOCATION: RIGHT KNEE STIFFNESS

## 2025-03-06 NOTE — OP THERAPY DAILY TREATMENT
Outpatient Physical Therapy  LYMPHEDEMA THERAPY DAILY TREATMENT     [facility] Outpatient Physical Therapy  [address]  [place] [address]  Phone:  [phone]  Fax:  [phone]    Date: 2025    Patient: Antonella Frausto  YOB: 1965  MRN: [id number]     Time Calculation                   Chief Complaint: Lymphedema Aquired    Visit #: 15    Subjective:   History of Present Illness:     Mechanism of injury:  Patient has been going to orthopedic PT 2x/day and has been told that she is doing well. She has out her reduction kits back on and is seeing good reduction in her leg size  Quality of life:  Good  Headaches:  no headaches  Sleep disturbance:  Not disrupted  Pain:     Current pain rating:  3    At best pain rating:  3    At worst pain ratin    Location:  Right knee stiffness    Pain timing:  All day    Relieving factors:  Nothing    Aggravating factors:  Walking and standing    Progression:  Unchanged  Social Support:     Lives in:  One-story house    Lives with:  Spouse  Treatments:     None    Patient Goals:     Patient goals for therapy:  Decreased edema and decreased pain      Lymphedema Objective    Visit type  Lipedema and Lipolymphedema    Postural Observation  Seated posture is fair  Standing posture is fair  Correction of posture has no consistent effect      Skin Appearance    moderate edema, pitting edema,  .    Sensation      Left Lower Extremity    Light tough is Impaired    Right Lower Extremity    Light touch is Impaired    Sensation Comments  Hypersensitivity  Left Lower Extremity Circumferential Measurements  Waist: 107.1 cm  Hip: 139.4 cm  Scrotum: cm  Ground/Upper Thigh: 74.9 cm  Mid Thigh: 60.6 cm  Knee: 46.5 cm  Upper Calf: 55.6 cm  Mid Calf: 52.7 cm  Ankle: 39.1 cm  Heel to Foot: 24.2 cm  Total: 600.1 cm    Right Lower Extremity Circumferential Measurements  Waist: 107.1 cm  Hip: 139.4 cm  Scrotum: cm  Ground/Upper Thigh:  69.3 cm  Mid Thigh: 56 cm  Knee: 48.4 cm  Upper Calf: 57.6 cm  Mid Calf: 50.3 cm  Ankle: 35 cm  Heel to Foot: 24.1 cm  Total: 587.2 cm    Lymphedema Stage  Stage 2 Lymphedema    Other Notes  L ankle - 31.5 cm  L Calf 55.1 cm  Left knee 50.3 cm  Length -49.4 cm  Left lower thigh - 56.4 cm  Left upper thigh - 57.8 cm  R ankle - 30.7 cm  R Calf 53.2 cm  R  knee 51.9 cm  Length - 34.1 cm  Right lower thigh - 57.6 cm  Right upper thigh - 62.1 cm          Therapeutic Treatments and Modalities:     1. Manual Therapy (CPT 26958)    2. Therapeutic Activities (CPT 81990), Measuring and re-fitting of CircAid reduction kits bilaterally    Therapeutic Treatment and Modalities Summary: Short neck sequence, abdominal sequence,  establishment of inguinal to axillary anastamoses, decongestion of the right lower extremity from proximal to distal with an emphasis on medial to lateral drainage    The purpose of Manual Lymph Drainage (MLD) is to reduce lymph volume in the affected limb by increasing intake of lymphatic load into the lymphatic system, increasing the volume of transported lymph fluid, moving lymph fluid in superficial lymph vessels to collateral lymph collectors, anastomoses, or tissue channels, and increasing venous return. The goal of MLD is to re-route the lymph flow around blocked areas into more centrally located healthy lymph vessels, which drain into the venous system.         Time-based treatments/modalities:           Assessment and Plan:   Assessment details:  Adjusted knee garments to approximate Velcro backing for comfort. She has good reduction in the limb and is hoping to start aquatic therapy following her next MD appointment in two weeks. Will continue with CDT until she is able to resume wearing of her maintenance garment    Barriers to therapy:  Comorbidities  Prognosis: fair      Goals:   Short Term Goals:  1 - Patient will be able to tolerate wearing reduction kit to bilateral lower extremities for  12/24 hours (GOAL MET)  2 - Patient will have no less than a 2 cm reduction in limb volume bilaterally (GOAL MET)  3 - Patient will be independent in HEP targeted for lymphatic fluid reduction (GOAL MET)  NEW GOALS 2/19/25  1 - Patient will resume wearing of her in elastic compression garments for 12/24 hours days per week to reduce lymphatic load  2 - Patient will resume use of her pneumatic compression pump for 5/7 days per week to reduce lymphatic load and promote lymphatic mobilization   3 - Patient will have no less than a 3 cm reduction in limb volume to facilitate lymphatic load clearance and mobility.     Short term goal time span:  4-6 weeks    Long Term Goals:  NEW GOALS  1 - Patient will be independent with maintenance phase management of lymphedema including: compression garments, skin care education, risk reduction strategies, manual lymphatic drainage, and therapeutic exercise   2 - Patient will have no less than a 5 cm reduction in limb volume bilaterally  3 - Patient will be able to ambulate 200 feet with the use of a SPC while wearing in elastic compression garmenst    Long term goal time span:  2-4 months    Plan:  Therapy options:  Physical therapy treatment to continue  Planned therapy interventions:  Addressing equipment needs, compression bandaging, compression stocking, decongestive exercises, home exercise program, intermittent compression, manual lymph drainage, Velcro wraps, referral for compression garment & instructions for don/doffing, patient education and range of motion exercises  Planned education:  Functional anatomy and physiology of the lymphatic system, pathophysiology of lymphedema, lymphedema exercise, proper skin care/nutrition, lymphedema precautions, compression bandaging, self massage, long term self-management of lymphedema, home pump use and activity guidelines  Frequency:  1x week  Duration in weeks:  12  Duration in visits:  12  Discussed with:  Patient

## 2025-03-13 ENCOUNTER — PHYSICAL THERAPY (OUTPATIENT)
Dept: PHYSICAL THERAPY | Facility: MEDICAL CENTER | Age: 60
End: 2025-03-13
Attending: NURSE PRACTITIONER
Payer: COMMERCIAL

## 2025-03-13 DIAGNOSIS — R60.0 BILATERAL LOWER EXTREMITY EDEMA: ICD-10-CM

## 2025-03-13 DIAGNOSIS — Z96.651 STATUS POST RIGHT KNEE REPLACEMENT: ICD-10-CM

## 2025-03-13 PROCEDURE — 97530 THERAPEUTIC ACTIVITIES: CPT

## 2025-03-13 PROCEDURE — 97140 MANUAL THERAPY 1/> REGIONS: CPT

## 2025-03-13 SDOH — ECONOMIC STABILITY: GENERAL: QUALITY OF LIFE: GOOD

## 2025-03-13 ASSESSMENT — ENCOUNTER SYMPTOMS
PAIN SCALE: 3
EXACERBATED BY: WALKING
PAIN TIMING: ALL DAY
EXACERBATED BY: STANDING
PAIN SCALE AT HIGHEST: 6
PAIN LOCATION: RIGHT KNEE STIFFNESS
ALLEVIATING FACTORS: NOTHING
PAIN SCALE AT LOWEST: 3

## 2025-03-13 NOTE — OP THERAPY DAILY TREATMENT
Outpatient Physical Therapy  LYMPHEDEMA THERAPY DAILY TREATMENT     [facility] Outpatient Physical Therapy  [address]  [place] [address]  Phone:  [phone]  Fax:  [phone]    Date: 2025    Patient: Antonella Frausto  YOB: 1965  MRN: [id number]     Time Calculation                   Chief Complaint: Lymphedema Aquired    Visit #: 16    Subjective:   History of Present Illness:     Mechanism of injury:  Patient will have a follow up with her MD next Monday. She is hoping to be cleared to return to aquatic therapy. She continues to do well with her orthopedic based PT    Quality of life:  Good  Headaches:  no headaches  Sleep disturbance:  Not disrupted  Pain:     Current pain rating:  3    At best pain rating:  3    At worst pain ratin    Location:  Right knee stiffness    Pain timing:  All day    Relieving factors:  Nothing    Aggravating factors:  Walking and standing    Progression:  Unchanged  Social Support:     Lives in:  One-story house    Lives with:  Spouse  Treatments:     None    Patient Goals:     Patient goals for therapy:  Decreased edema and decreased pain      Lymphedema Objective    Visit type  Lipedema and Lipolymphedema    Postural Observation  Seated posture is fair  Standing posture is fair  Correction of posture has no consistent effect      Skin Appearance    moderate edema, pitting edema,  .    Sensation      Left Lower Extremity    Light tough is Impaired    Right Lower Extremity    Light touch is Impaired    Sensation Comments  Hypersensitivity  Left Lower Extremity Circumferential Measurements  Waist: 107.1 cm  Hip: 139.4 cm  Scrotum: cm  Ground/Upper Thigh: 74.9 cm  Mid Thigh: 60.6 cm  Knee: 46.5 cm  Upper Calf: 55.6 cm  Mid Calf: 52.7 cm  Ankle: 39.1 cm  Heel to Foot: 24.2 cm  Total: 600.1 cm    Right Lower Extremity Circumferential Measurements  Waist: 107.1 cm  Hip: 139.4 cm  Scrotum: cm  Ground/Upper Thigh: 69.3  cm  Mid Thigh: 56 cm  Knee: 46.1 cm  Upper Calf: 55 cm  Mid Calf: 48.5 cm  Ankle: 33.5 cm  Heel to Foot: 24.4 cm  Total: 579.3 cm    Lymphedema Stage  Stage 2 Lymphedema    Other Notes  L ankle - 31.5 cm  L Calf 55.1 cm  Left knee 50.3 cm  Length -49.4 cm  Left lower thigh - 56.4 cm  Left upper thigh - 57.8 cm  R ankle - 30.7 cm  R Calf 53.2 cm  R  knee 51.9 cm  Length - 34.1 cm  Right lower thigh - 57.6 cm  Right upper thigh - 62.1 cm          Therapeutic Treatments and Modalities:     1. Manual Therapy (CPT 48780)    2. Therapeutic Activities (CPT 40268), Measuring and re-fitting of CircAid reduction kits to the RLE    Therapeutic Treatment and Modalities Summary: Short neck sequence, abdominal sequence,  establishment of inguinal to axillary anastamoses, decongestion of the right lower extremity from proximal to distal with an emphasis on medial to lateral drainage    The purpose of Manual Lymph Drainage (MLD) is to reduce lymph volume in the affected limb by increasing intake of lymphatic load into the lymphatic system, increasing the volume of transported lymph fluid, moving lymph fluid in superficial lymph vessels to collateral lymph collectors, anastomoses, or tissue channels, and increasing venous return. The goal of MLD is to re-route the lymph flow around blocked areas into more centrally located healthy lymph vessels, which drain into the venous system.         Time-based treatments/modalities:           Assessment and Plan:   Assessment details:  Patient continues to show improved reduction in RLE swelling. Her baseline prior to surgery was 561.4 cm and today she is measuring at 579.3 cm. When she is cleared to return to the pool and her measurements are closer to baseline we will likely D/C her back to a maintenance based plan of care to preserve therapy appointments for her L TKA procedure  Barriers to therapy:  Comorbidities  Prognosis: fair      Goals:   Short Term Goals:  1 - Patient will be able to  tolerate wearing reduction kit to bilateral lower extremities for 12/24 hours (GOAL MET)  2 - Patient will have no less than a 2 cm reduction in limb volume bilaterally (GOAL MET)  3 - Patient will be independent in HEP targeted for lymphatic fluid reduction (GOAL MET)  NEW GOALS 2/19/25  1 - Patient will resume wearing of her in elastic compression garments for 12/24 hours days per week to reduce lymphatic load  2 - Patient will resume use of her pneumatic compression pump for 5/7 days per week to reduce lymphatic load and promote lymphatic mobilization   3 - Patient will have no less than a 3 cm reduction in limb volume to facilitate lymphatic load clearance and mobility.     Short term goal time span:  4-6 weeks    Long Term Goals:  NEW GOALS  1 - Patient will be independent with maintenance phase management of lymphedema including: compression garments, skin care education, risk reduction strategies, manual lymphatic drainage, and therapeutic exercise   2 - Patient will have no less than a 5 cm reduction in limb volume bilaterally  3 - Patient will be able to ambulate 200 feet with the use of a SPC while wearing in elastic compression garmenst    Long term goal time span:  2-4 months    Plan:  Therapy options:  Physical therapy treatment to continue  Planned therapy interventions:  Addressing equipment needs, compression bandaging, compression stocking, decongestive exercises, home exercise program, intermittent compression, manual lymph drainage, Velcro wraps, referral for compression garment & instructions for don/doffing, patient education and range of motion exercises  Planned education:  Functional anatomy and physiology of the lymphatic system, pathophysiology of lymphedema, lymphedema exercise, proper skin care/nutrition, lymphedema precautions, compression bandaging, self massage, long term self-management of lymphedema, home pump use and activity guidelines  Frequency:  1x week  Duration in weeks:   12  Duration in visits:  12  Discussed with:  Patient

## 2025-03-19 ENCOUNTER — PHYSICAL THERAPY (OUTPATIENT)
Dept: PHYSICAL THERAPY | Facility: MEDICAL CENTER | Age: 60
End: 2025-03-19
Attending: NURSE PRACTITIONER
Payer: COMMERCIAL

## 2025-03-19 DIAGNOSIS — M25.661 KNEE STIFFNESS, RIGHT: ICD-10-CM

## 2025-03-19 DIAGNOSIS — R60.0 BILATERAL LOWER EXTREMITY EDEMA: ICD-10-CM

## 2025-03-19 DIAGNOSIS — Z96.651 STATUS POST RIGHT KNEE REPLACEMENT: ICD-10-CM

## 2025-03-19 PROCEDURE — 97140 MANUAL THERAPY 1/> REGIONS: CPT

## 2025-03-19 SDOH — ECONOMIC STABILITY: GENERAL: QUALITY OF LIFE: GOOD

## 2025-03-19 ASSESSMENT — ENCOUNTER SYMPTOMS
PAIN TIMING: ALL DAY
PAIN SCALE AT HIGHEST: 6
PAIN SCALE AT LOWEST: 3
EXACERBATED BY: WALKING
PAIN SCALE: 3
EXACERBATED BY: STANDING
PAIN LOCATION: RIGHT KNEE STIFFNESS
ALLEVIATING FACTORS: NOTHING

## 2025-03-19 NOTE — OP THERAPY DAILY TREATMENT
Outpatient Physical Therapy  LYMPHEDEMA THERAPY DAILY TREATMENT     [facility] Outpatient Physical Therapy  [address]  [place] [address]  Phone:  [phone]  Fax:  [phone]    Date: 2025    Patient: Antonella Frausto  YOB: 1965  MRN: [id number]     Time Calculation                   Chief Complaint: No chief complaint on file.    Visit #: 17    Subjective:   History of Present Illness:     Mechanism of injury:  Patient has been cleared to return to aquatic therapy. Her surgeon feels that whenever she  is ready to proceed with her L TKA, they will schedule  Quality of life:  Good  Headaches:  no headaches  Sleep disturbance:  Not disrupted  Pain:     Current pain rating:  3    At best pain rating:  3    At worst pain ratin    Location:  Right knee stiffness    Pain timing:  All day    Relieving factors:  Nothing    Aggravating factors:  Walking and standing    Progression:  Unchanged  Social Support:     Lives in:  One-story house    Lives with:  Spouse  Treatments:     None    Patient Goals:     Patient goals for therapy:  Decreased edema and decreased pain      Lymphedema Objective    Visit type  Lipedema and Lipolymphedema    Postural Observation  Seated posture is fair  Standing posture is fair  Correction of posture has no consistent effect      Skin Appearance    moderate edema, pitting edema,  .    Sensation      Left Lower Extremity    Light tough is Impaired    Right Lower Extremity    Light touch is Impaired    Sensation Comments  Hypersensitivity  Left Lower Extremity Circumferential Measurements  Waist: 107.1 cm  Hip: 139.4 cm  Scrotum: cm  Ground/Upper Thigh: 74.9 cm  Mid Thigh: 60.6 cm  Knee: 46.5 cm  Upper Calf: 55.6 cm  Mid Calf: 52.7 cm  Ankle: 39.1 cm  Heel to Foot: 24.2 cm  Total: 600.1 cm    Right Lower Extremity Circumferential Measurements  Waist: 107.1 cm  Hip: 139.4 cm  Scrotum: cm  Ground/Upper Thigh: 69.3 cm  Mid  Thigh: 56 cm  Knee: 45.6 cm  Upper Calf: 55.1 cm  Mid Calf: 49.5 cm  Ankle: 35.6 cm  Heel to Foot: 24.4 cm  Total: 582 cm    Lymphedema Stage  Stage 2 Lymphedema    Other Notes  L ankle - 31.5 cm  L Calf 55.1 cm  Left knee 50.3 cm  Length -49.4 cm  Left lower thigh - 56.4 cm  Left upper thigh - 57.8 cm  R ankle - 30.7 cm  R Calf 53.2 cm  R  knee 51.9 cm  Length - 34.1 cm  Right lower thigh - 57.6 cm  Right upper thigh - 62.1 cm          Therapeutic Treatments and Modalities:     1. Manual Therapy (CPT 27100)    Therapeutic Treatment and Modalities Summary: Short neck sequence, abdominal sequence,  establishment of inguinal to axillary anastamoses, decongestion of the right lower extremity from proximal to distal with an emphasis on medial to lateral drainage    The purpose of Manual Lymph Drainage (MLD) is to reduce lymph volume in the affected limb by increasing intake of lymphatic load into the lymphatic system, increasing the volume of transported lymph fluid, moving lymph fluid in superficial lymph vessels to collateral lymph collectors, anastomoses, or tissue channels, and increasing venous return. The goal of MLD is to re-route the lymph flow around blocked areas into more centrally located healthy lymph vessels, which drain into the venous system.         Time-based treatments/modalities:           Assessment and Plan:   Assessment details:  Patient did not wear her reduction kit yesterday while cleaning, the measurements into her R calf are slightly up, but they knee and thigh are both down. I ask that she try to don her solaris ready wrap system to the knee and thigh over this next week to see if they are capable of maintaining her volume. If they are able to do so and she is able to get into the pool will likely d/c to maintenance plan to preserve visits for her left knee  Barriers to therapy:  Comorbidities  Prognosis: fair      Goals:   Short Term Goals:  1 - Patient will be able to tolerate wearing  reduction kit to bilateral lower extremities for 12/24 hours (GOAL MET)  2 - Patient will have no less than a 2 cm reduction in limb volume bilaterally (GOAL MET)  3 - Patient will be independent in HEP targeted for lymphatic fluid reduction (GOAL MET)  NEW GOALS 2/19/25  1 - Patient will resume wearing of her in elastic compression garments for 12/24 hours days per week to reduce lymphatic load  2 - Patient will resume use of her pneumatic compression pump for 5/7 days per week to reduce lymphatic load and promote lymphatic mobilization   3 - Patient will have no less than a 3 cm reduction in limb volume to facilitate lymphatic load clearance and mobility.     Short term goal time span:  4-6 weeks    Long Term Goals:  NEW GOALS  1 - Patient will be independent with maintenance phase management of lymphedema including: compression garments, skin care education, risk reduction strategies, manual lymphatic drainage, and therapeutic exercise   2 - Patient will have no less than a 5 cm reduction in limb volume bilaterally  3 - Patient will be able to ambulate 200 feet with the use of a SPC while wearing in elastic compression garmenst    Long term goal time span:  2-4 months    Plan:  Therapy options:  Physical therapy treatment to continue  Planned therapy interventions:  Addressing equipment needs, compression bandaging, compression stocking, decongestive exercises, home exercise program, intermittent compression, manual lymph drainage, Velcro wraps, referral for compression garment & instructions for don/doffing, patient education and range of motion exercises  Planned education:  Functional anatomy and physiology of the lymphatic system, pathophysiology of lymphedema, lymphedema exercise, proper skin care/nutrition, lymphedema precautions, compression bandaging, self massage, long term self-management of lymphedema, home pump use and activity guidelines  Frequency:  1x week  Duration in weeks:  12  Duration in  visits:  12  Discussed with:  Patient

## 2025-03-20 PROBLEM — M17.12 PRIMARY OSTEOARTHRITIS OF LEFT KNEE: Status: ACTIVE | Noted: 2025-03-18

## 2025-03-24 DIAGNOSIS — E89.0 POSTOPERATIVE HYPOTHYROIDISM: ICD-10-CM

## 2025-03-24 RX ORDER — LEVOTHYROXINE SODIUM 150 UG/1
150 TABLET ORAL
Qty: 90 TABLET | Refills: 0 | Status: SHIPPED | OUTPATIENT
Start: 2025-03-24

## 2025-03-24 NOTE — TELEPHONE ENCOUNTER
Received request via: Pharmacy    Was the patient seen in the last year in this department? Yes    Does the patient have an active prescription (recently filled or refills available) for medication(s) requested? No    [facility]    Does the patient have [facility] and need 100-day supply? (This applies to ALL medications) Patient does not have SCP

## 2025-03-24 NOTE — TELEPHONE ENCOUNTER
Requested Prescriptions     Pending Prescriptions Disp Refills    levothyroxine (SYNTHROID) 150 MCG Tab [Pharmacy Med Name: LEVOTHYROXINE 0.150MG (150MCG) TAB] 90 Tablet 0     Sig: TAKE 1 TABLET BY MOUTH EVERY MORNING ON AN EMPTY STOMACH       TOVA Nieto.

## 2025-03-26 ENCOUNTER — APPOINTMENT (OUTPATIENT)
Dept: PHYSICAL THERAPY | Facility: MEDICAL CENTER | Age: 60
End: 2025-03-26
Payer: COMMERCIAL

## 2025-03-31 ENCOUNTER — APPOINTMENT (OUTPATIENT)
Dept: ADMISSIONS | Facility: MEDICAL CENTER | Age: 60
End: 2025-03-31
Attending: ORTHOPAEDIC SURGERY
Payer: COMMERCIAL

## 2025-04-01 ENCOUNTER — PHYSICAL THERAPY (OUTPATIENT)
Dept: PHYSICAL THERAPY | Facility: MEDICAL CENTER | Age: 60
End: 2025-04-01
Attending: NURSE PRACTITIONER
Payer: COMMERCIAL

## 2025-04-01 DIAGNOSIS — Z96.651 STATUS POST RIGHT KNEE REPLACEMENT: ICD-10-CM

## 2025-04-01 DIAGNOSIS — R60.0 BILATERAL LOWER EXTREMITY EDEMA: ICD-10-CM

## 2025-04-01 PROCEDURE — 97140 MANUAL THERAPY 1/> REGIONS: CPT

## 2025-04-01 SDOH — ECONOMIC STABILITY: GENERAL: QUALITY OF LIFE: GOOD

## 2025-04-01 ASSESSMENT — ENCOUNTER SYMPTOMS
PAIN TIMING: ALL DAY
PAIN SCALE AT HIGHEST: 6
EXACERBATED BY: WALKING
PAIN SCALE AT LOWEST: 3
PAIN SCALE: 3
PAIN LOCATION: RIGHT KNEE STIFFNESS
EXACERBATED BY: STANDING
ALLEVIATING FACTORS: NOTHING

## 2025-04-01 NOTE — OP THERAPY DAILY TREATMENT
Outpatient Physical Therapy  LYMPHEDEMA THERAPY DAILY TREATMENT     [facility] Outpatient Physical Therapy  [address]  [place] [address]  Phone:  [phone]  Fax:  [phone]    Date: 2025    Patient: Antonella Frausto  YOB: 1965  MRN: [id number]     Time Calculation    Start time: 0815  Stop time: 0853 Time Calculation (min): 38 minutes         Chief Complaint: Lymphedema Aquired    Visit #: 18    Subjective:   History of Present Illness:     Mechanism of injury:  Patient has been scheduled for her L TKA on 25. She has been able to fit back into her ready wraps and is also going to the pool 2x/week   Quality of life:  Good  Headaches:  no headaches  Sleep disturbance:  Not disrupted  Pain:     Current pain rating:  3    At best pain rating:  3    At worst pain ratin    Location:  Right knee stiffness    Pain timing:  All day    Relieving factors:  Nothing    Aggravating factors:  Walking and standing    Progression:  Unchanged  Social Support:     Lives in:  One-story house    Lives with:  Spouse  Treatments:     None    Patient Goals:     Patient goals for therapy:  Decreased edema and decreased pain      Lymphedema Objective    Visit type  Lipedema and Lipolymphedema    Postural Observation  Seated posture is fair  Standing posture is fair  Correction of posture has no consistent effect      Skin Appearance    moderate edema, pitting edema,  .    Sensation      Left Lower Extremity    Light tough is Impaired    Right Lower Extremity    Light touch is Impaired    Sensation Comments  Hypersensitivity  Left Lower Extremity Circumferential Measurements  Waist: 107.1 cm  Hip: 139.4 cm  Scrotum: cm  Ground/Upper Thigh: 74.9 cm  Mid Thigh: 60.6 cm  Knee: 46.5 cm  Upper Calf: 54.1 cm  Mid Calf: 48.1 cm  Ankle: 34.6 cm  Heel to Foot: 24.2 cm  Total: 589.5 cm    Right Lower Extremity Circumferential Measurements  Waist: 107.1 cm  Hip: 139.4  cm  Scrotum: cm  Ground/Upper Thigh: 66.9 cm  Mid Thigh: 55.1 cm  Knee: 45.6 cm  Upper Calf: 55.9 cm  Mid Calf: 45.9 cm  Ankle: 33.1 cm  Heel to Foot: 24 cm  Total: 573 cm    Lymphedema Stage  Stage 2 Lymphedema    Other Notes  L ankle - 31.5 cm  L Calf 55.1 cm  Left knee 50.3 cm  Length -49.4 cm  Left lower thigh - 56.4 cm  Left upper thigh - 57.8 cm  R ankle - 30.7 cm  R Calf 53.2 cm  R  knee 51.9 cm  Length - 34.1 cm  Right lower thigh - 57.6 cm  Right upper thigh - 62.1 cm          Therapeutic Treatments and Modalities:     1. Manual Therapy (CPT 48214)    Therapeutic Treatment and Modalities Summary: Short neck sequence, abdominal sequence,  establishment of inguinal to axillary anastamoses, decongestion of bilateral lower extremities from proximal to distal with an emphasis on medial to lateral drainage    The purpose of Manual Lymph Drainage (MLD) is to reduce lymph volume in the affected limb by increasing intake of lymphatic load into the lymphatic system, increasing the volume of transported lymph fluid, moving lymph fluid in superficial lymph vessels to collateral lymph collectors, anastomoses, or tissue channels, and increasing venous return. The goal of MLD is to re-route the lymph flow around blocked areas into more centrally located healthy lymph vessels, which drain into the venous system.         Time-based treatments/modalities:    Physical Therapy Timed Treatment Charges  Manual therapy minutes (CPT 17572): 38 minutes      Assessment and Plan:   Assessment details:  Patient has been able to return to her pre-surgical lipedema management routine. Her measurements all show good response and she has started to do pre-hab strengthening exercises for her L knee. In an effort to reserve her visits for the year for after her surgery we will hold all remaining sessions for this month and look to schedule three weeks post op. Will follow up post operatively   Barriers to therapy:  Comorbidities  Prognosis:  fair      Goals:   Short Term Goals:  1 - Patient will be able to tolerate wearing reduction kit to bilateral lower extremities for 12/24 hours (GOAL MET)  2 - Patient will have no less than a 2 cm reduction in limb volume bilaterally (GOAL MET)  3 - Patient will be independent in HEP targeted for lymphatic fluid reduction (GOAL MET)  NEW GOALS 2/19/25  1 - Patient will resume wearing of her in elastic compression garments for 12/24 hours days per week to reduce lymphatic load  2 - Patient will resume use of her pneumatic compression pump for 5/7 days per week to reduce lymphatic load and promote lymphatic mobilization   3 - Patient will have no less than a 3 cm reduction in limb volume to facilitate lymphatic load clearance and mobility.     Short term goal time span:  4-6 weeks    Long Term Goals:  NEW GOALS  1 - Patient will be independent with maintenance phase management of lymphedema including: compression garments, skin care education, risk reduction strategies, manual lymphatic drainage, and therapeutic exercise   2 - Patient will have no less than a 5 cm reduction in limb volume bilaterally  3 - Patient will be able to ambulate 200 feet with the use of a SPC while wearing in elastic compression garmenst    Long term goal time span:  2-4 months    Plan:  Therapy options:  Physical therapy treatment to continue  Planned therapy interventions:  Addressing equipment needs, compression bandaging, compression stocking, decongestive exercises, home exercise program, intermittent compression, manual lymph drainage, Velcro wraps, referral for compression garment & instructions for don/doffing, patient education and range of motion exercises  Planned education:  Functional anatomy and physiology of the lymphatic system, pathophysiology of lymphedema, lymphedema exercise, proper skin care/nutrition, lymphedema precautions, compression bandaging, self massage, long term self-management of lymphedema, home pump use and  activity guidelines  Frequency:  1x week  Duration in weeks:  12  Duration in visits:  12  Discussed with:  Patient

## 2025-04-07 ENCOUNTER — PRE-ADMISSION TESTING (OUTPATIENT)
Dept: ADMISSIONS | Facility: MEDICAL CENTER | Age: 60
End: 2025-04-07
Attending: ORTHOPAEDIC SURGERY
Payer: COMMERCIAL

## 2025-04-07 VITALS — BODY MASS INDEX: 39.48 KG/M2 | HEIGHT: 64 IN

## 2025-04-07 DIAGNOSIS — Z01.812 PRE-OPERATIVE LABORATORY EXAMINATION: ICD-10-CM

## 2025-04-07 NOTE — PREADMIT AVS NOTE
Current Medications   Medication Instructions    levothyroxine (SYNTHROID) 150 MCG Tab Continue taking medication as prescribed, including morning of procedure     Ivermectin 1 % Cream Hold medication day of procedure    fluticasone (FLONASE) 50 MCG/ACT nasal spray Continue taking medication as prescribed, including morning of procedure     ondansetron (ZOFRAN ODT) 4 MG TABLET DISPERSIBLE Continue taking medication as prescribed, including morning of procedure     Oxymetazoline HCl (NASAL SPRAY 12 HOUR NA) Continue taking medication as prescribed, including morning of procedure     Vitamin D-Vitamin K (VITAMIN K2-VITAMIN D3 PO) Stop 7 days before surgery    Ascorbic Acid (VITAMIN C) 1000 MG Tab Stop 7 days before surgery    Potassium 99 MG Tab Stop 7 days before surgery    Zinc 100 MG Tab Stop 7 days before surgery    Magnesium 200 MG Tab Stop 7 days before surgery    triamcinolone acetonide (KENALOG) 0.5 % Cream Hold medication day of procedure    cetirizine (ZYRTEC) 10 MG TABS Continue taking medication as prescribed, including morning of procedure

## 2025-04-07 NOTE — PREPROCEDURE INSTRUCTIONS
PreAdmit Telephone Appointment: Reviewed the Preparing for your procedure handout with patient over the phone. Patient instructed per pharmacy guidelines regarding taking, holding or contacting provider for instructions on regularly prescribed medications before surgery.  Levothyroxine, zofran, nasal sprays, zyrtec.  Confirmed with patient where to check in morning of surgery. Handouts/Brochure/Video emailed to patient.

## 2025-04-09 ENCOUNTER — APPOINTMENT (OUTPATIENT)
Dept: PHYSICAL THERAPY | Facility: MEDICAL CENTER | Age: 60
End: 2025-04-09
Attending: NURSE PRACTITIONER
Payer: COMMERCIAL

## 2025-04-15 ENCOUNTER — PRE-ADMISSION TESTING (OUTPATIENT)
Dept: ADMISSIONS | Facility: MEDICAL CENTER | Age: 60
End: 2025-04-15
Attending: ORTHOPAEDIC SURGERY
Payer: COMMERCIAL

## 2025-04-15 ENCOUNTER — APPOINTMENT (OUTPATIENT)
Dept: PHYSICAL THERAPY | Facility: MEDICAL CENTER | Age: 60
End: 2025-04-15
Attending: NURSE PRACTITIONER
Payer: COMMERCIAL

## 2025-04-15 VITALS — BODY MASS INDEX: 43.81 KG/M2 | WEIGHT: 256.62 LBS | HEIGHT: 64 IN

## 2025-04-15 DIAGNOSIS — Z01.812 PRE-OPERATIVE LABORATORY EXAMINATION: ICD-10-CM

## 2025-04-15 LAB
ANION GAP SERPL CALC-SCNC: 11 MMOL/L (ref 7–16)
BUN SERPL-MCNC: 28 MG/DL (ref 8–22)
CALCIUM SERPL-MCNC: 9.4 MG/DL (ref 8.5–10.5)
CHLORIDE SERPL-SCNC: 105 MMOL/L (ref 96–112)
CO2 SERPL-SCNC: 22 MMOL/L (ref 20–33)
CREAT SERPL-MCNC: 0.72 MG/DL (ref 0.5–1.4)
ERYTHROCYTE [DISTWIDTH] IN BLOOD BY AUTOMATED COUNT: 49.2 FL (ref 35.9–50)
GFR SERPLBLD CREATININE-BSD FMLA CKD-EPI: 95 ML/MIN/1.73 M 2
GLUCOSE SERPL-MCNC: 88 MG/DL (ref 65–99)
HCT VFR BLD AUTO: 39.9 % (ref 37–47)
HGB BLD-MCNC: 12.5 G/DL (ref 12–16)
MCH RBC QN AUTO: 27.8 PG (ref 27–33)
MCHC RBC AUTO-ENTMCNC: 31.3 G/DL (ref 32.2–35.5)
MCV RBC AUTO: 88.9 FL (ref 81.4–97.8)
PLATELET # BLD AUTO: 329 K/UL (ref 164–446)
PMV BLD AUTO: 10.1 FL (ref 9–12.9)
POTASSIUM SERPL-SCNC: 4.3 MMOL/L (ref 3.6–5.5)
RBC # BLD AUTO: 4.49 M/UL (ref 4.2–5.4)
SCCMEC + MECA PNL NOSE NAA+PROBE: NEGATIVE
SCCMEC + MECA PNL NOSE NAA+PROBE: POSITIVE
SODIUM SERPL-SCNC: 138 MMOL/L (ref 135–145)
WBC # BLD AUTO: 8.4 K/UL (ref 4.8–10.8)

## 2025-04-15 PROCEDURE — 80048 BASIC METABOLIC PNL TOTAL CA: CPT

## 2025-04-15 PROCEDURE — 87640 STAPH A DNA AMP PROBE: CPT

## 2025-04-15 PROCEDURE — 87641 MR-STAPH DNA AMP PROBE: CPT

## 2025-04-15 PROCEDURE — 36415 COLL VENOUS BLD VENIPUNCTURE: CPT

## 2025-04-15 PROCEDURE — 85027 COMPLETE CBC AUTOMATED: CPT

## 2025-04-15 ASSESSMENT — FIBROSIS 4 INDEX: FIB4 SCORE: 0.75

## 2025-04-15 NOTE — DISCHARGE PLANNING
DISCHARGE PLANNING NOTE - TOTAL JOINT    Procedure:     LEFT TOTAL KNEE ARTHROPLASTY   Procedure Date: 04/29/25  Insurance: Payor: [employer] / Plan: [employer] / Product Type: *No Product type* /    Equipment currently available at home?  front-wheel walker, raised toilet seat, and hand held shower head  Steps into the home? 2  Steps within the home? 0  Toilet height? Standard  Type of shower? tub-shower  Home Oxygen? no  Portable tank?    Oxygen Provider:  Planning same day discharge: yes    Is Outpatient Physical Therapy set up after surgery? Yes  Did you take the Total Joint Class and where or did you receive an Educational booklet? Yes, received NAON book.  Who will be your transportation home on day of discharge?     Have you made arrangements to have someone stay with you at home for the first 3 days following discharge, and if so, whom?     Have you notified your surgeon that you do not have transportation or someone to help you after discharge? N/a    Are you planning on going to a transitional care facility, for example a skilled nursing facility, post operatively for rehab, and if so, have you contacted your insurance plan to see if they cover this? no    Met with the pt. Pt given a copy of Home Safety Checklist, Equipment Resource Guide, CHG scrub kit and instructions. Expected process in Recovery Room and dc criteria discussed with pt. All questions answered and verbalizes understanding of all instructions. No dc needs identified at this time. Anticipate dc to home without barriers. MRSA swab obtained.

## 2025-04-21 RX ORDER — ONDANSETRON 4 MG/1
4 TABLET, ORALLY DISINTEGRATING ORAL EVERY 6 HOURS PRN
Qty: 20 TABLET | Refills: 1 | Status: SHIPPED | OUTPATIENT
Start: 2025-04-21

## 2025-04-21 RX ORDER — ASPIRIN 81 MG/1
81 TABLET, CHEWABLE ORAL 2 TIMES DAILY
Qty: 56 TABLET | Refills: 0
Start: 2025-04-21 | End: 2025-05-19

## 2025-04-21 RX ORDER — IBUPROFEN 600 MG/1
600 TABLET, FILM COATED ORAL EVERY 6 HOURS PRN
Qty: 30 TABLET | Refills: 0
Start: 2025-04-21

## 2025-04-21 RX ORDER — DOCUSATE SODIUM 100 MG/1
100 CAPSULE, LIQUID FILLED ORAL 2 TIMES DAILY
Qty: 60 CAPSULE | Refills: 0 | Status: SHIPPED | OUTPATIENT
Start: 2025-04-21 | End: 2025-05-21

## 2025-04-21 RX ORDER — GABAPENTIN 300 MG/1
300 CAPSULE ORAL NIGHTLY
Qty: 14 CAPSULE | Refills: 0 | Status: SHIPPED | OUTPATIENT
Start: 2025-04-21 | End: 2025-05-05

## 2025-04-21 RX ORDER — OXYCODONE HYDROCHLORIDE 5 MG/1
5 TABLET ORAL EVERY 6 HOURS PRN
Qty: 28 TABLET | Refills: 0 | Status: SHIPPED | OUTPATIENT
Start: 2025-04-21 | End: 2025-04-28

## 2025-04-21 RX ORDER — METHOCARBAMOL 500 MG/1
500 TABLET, FILM COATED ORAL 2 TIMES DAILY
Qty: 28 TABLET | Refills: 0 | Status: SHIPPED | OUTPATIENT
Start: 2025-04-21 | End: 2025-05-05

## 2025-04-21 RX ORDER — TRAMADOL HYDROCHLORIDE 50 MG/1
50 TABLET ORAL EVERY 6 HOURS PRN
Qty: 28 TABLET | Refills: 0 | Status: SHIPPED | OUTPATIENT
Start: 2025-04-21 | End: 2025-04-28

## 2025-04-21 RX ORDER — HYDROXYZINE PAMOATE 25 MG/1
25 CAPSULE ORAL 3 TIMES DAILY
Qty: 15 CAPSULE | Refills: 0 | Status: SHIPPED | OUTPATIENT
Start: 2025-04-21 | End: 2025-04-26

## 2025-04-21 RX ORDER — ACETAMINOPHEN 500 MG
1000 TABLET ORAL 3 TIMES DAILY
Qty: 180 TABLET | Refills: 0
Start: 2025-04-21 | End: 2025-05-21

## 2025-04-21 NOTE — DISCHARGE INSTRUCTIONS
Follow up with Dr. Villatoro in 2 weeks    You may call or text Dr. Villatoro' medical assistant during business hours at [phone].  You may call the emergency BON line during nights/weekends/holidays if needed at [phone].    Instructions:  -Leave the bandage in place until your followup appointment in 2 weeks.  -May shower with bandage in place, if bandage becomes soaked underneath please remove and call the office immediately.  -No baths/tubs/pools/submersion of the wound for now.  -Call if there is significant drainage beneath the bandage    -Put as much weight as comfortable on the operative leg.  Use a walker to assist with balance to avoid any falls.  -It is important to start moving and stretching right away, otherwise the joint can stiffen.    -Take your blood clot prevention medication for 4 weeks after surgery (81 mg of Aspirin, twice daily). This is an over the counter medication and will not be prescribed  -Use ice frequently to help with pain and swelling control  -Pain management:  Standard pain regimen should be:  Ice as much as possible.  Apply the ice anywhere you feel pain.  Tylenol (acetaminophen) - 1000mg every 8 hours. Take this automatically on a scheduled basis. This is an over the counter medication and will not be prescribed  Ibuprofen: 600 mg (3 gel caps) every 8 hours. To be alternated with Acetaminophen (4 hours after Tylenol) as described above. This is an over the counter medication and will not be prescribed  Gabapentin: 300 mg at nighttime.   Robaxin: 500 mg twice a day. Helps with muscle spasms or cramps  Hydroxyzine: 25 mg every 8 hours. Continue for 5 days.  Ondansetron 4mg tabs to be taken every 6 hours as needed for nausea or vomiting    -If you develop any confusion or are not mentally clear you can stop the hydroxyzine first.  If this does not resolve within 24 hours stop the gabapentin as well.      The following 2 medications are opiates.  If you would like to try to  avoid use of opiates do not take these.  If you do have uncontrolled pain start with the tramadol first and only use oxycodone for breakthrough pain.  Tramadol - 1 capsules every 6 hours. Take this automatically on a scheduled basis until no longer needed for pain.  Oxycodone - 1 tablets every 6 hours only if needed.  You would take the oxycodone 3 hours after the Tramadol, such that you are taking one or the other every 3 hours.  Please use oxycodone for breakthrough pain.        -Try to limit the amount of oxycodone since it tends to cause constipation, drowsiness, etc.  But if you need it, take it.        -100mg of Colace (docusate) will be prescribed. Take this twice a day while still taking Tramadol or Oxycodone. Can discontinue after opiates are no longer being taken.  If bowel movement has not occurred in 48 hours please add in a laxative called Senna (over the counter) twice daily in conjunction with the colace. If no bowel movement is produced 48 hours after adding in Senna please start Milk of Magnesia (over the counter).     ICE: Ice is one of the best anti-inflammatories available and is often overlooked as a source of pain control after surgery. Recommended protocols include 20 minutes “on” and then 20 minutes “off” when resting for as often as desired/tolerated, with 30 minutes “on” after physical therapy and first thing in the morning as well.    AGGRESSIVE RANGE OF MOTION.  Do a quad set a million times per day.  Have a family member push down on the thigh and shin every 20 minutes with 2-3 pillows under the heel to help stretch the knee straight.  Flex the knee as far as possible every 20 minutes.         Peripheral Nerve Block Discharge Instructions from Same Day Surgery and Inpatient :    What to Expect - Lower Extremity  The block may cause you to experience numbness and weakness in your hip and thigh, thigh and knee, or calf and foot on the same side as your surgery  Numbness, tingling and / or  "weakness are all normal. For some people, this may be an unpleasant sensation  These issues will be resolved when the local anesthetic wears off   You may experience numbness and tingling in your thigh on the same side as your surgery if the block medicine was injected at your groin area  Numbness will make it difficult to walk  You may have problems with balance and walking so be very careful   Follow your surgeon's direction regarding weight bearing on your surgical limb  Be very careful with your numb limb  Precautions  The numbness may affect your balance  Be careful when walking or moving around  Your leg may be weak: be very careful putting weight on it  If your surgeon did not specify a time, you should not bear weight for 24 hours  Be sure to ask for help when you need it  It is better to have help than to fall and hurt yourself  Prevent Injury  Protect the limb like a baby  Beware of exposing your limb to extreme heat or cold or trauma  The limb may be injured without you noticing because it is numb  Keep the limb elevated whenever possible  Do not sleep on the limb  Change the position of the limb regularly  Avoid putting pressure on your surgical limb  Pain Control  The initial block on the day of surgery will make your extremity feel \"numb\"  Any consecutive injection including prior to discharge from the hospital will make your extremity feel \"numb\"  You may feel an aching or burning when the local anesthesia starts to wear off  Take pain pills as prescribed by your surgeon  Call your surgeon or anesthesiologist if you do not have adequate pain control    ACTIVITY: Rest and take it easy for the first 24 hours.  A responsible adult is recommended to remain with you during that time.  It is normal to feel sleepy.  We encourage you to not do anything that requires balance, judgment or coordination.    MILD FLU-LIKE SYMPTOMS ARE NORMAL. YOU MAY EXPERIENCE GENERALIZED MUSCLE ACHES, THROAT IRRITATION, HEADACHE " AND/OR SOME NAUSEA.    FOR 24 HOURS DO NOT:  Drive, operate machinery or run household appliances.  Drink beer or alcoholic beverages.   Make important decisions or sign legal documents.        DIET: To avoid nausea, slowly advance diet as tolerated, avoiding spicy or greasy foods for the first day.  Add more substantial food to your diet according to your physician's instructions.  INCREASE FLUIDS AND FIBER TO AVOID CONSTIPATION.      You should CALL YOUR PHYSICIAN if you develop:  Fever greater than 101 degrees F.  Pain not relieved by medication, or persistent nausea or vomiting.  Excessive bleeding (blood soaking through dressing) or unexpected drainage from the wound.  Extreme redness or swelling around the incision site, drainage of pus or foul smelling drainage.  Inability to urinate or empty your bladder within 8 hours.  Problems with breathing or chest pain.    You should call 911 if you develop problems with breathing or chest pain.  If you are unable to contact your doctor or surgical center, you should go to the nearest emergency room or urgent care center.      If any questions arise, call your doctor.  If your doctor is not available, please feel free to call the Surgical Center at [phone].     A registered nurse may call you a few days after your surgery to see how you are doing after your procedure.    MEDICATIONS: Resume taking daily medication.  Take prescribed pain medication with food.  If no medication is prescribed, you may take non-aspirin pain medication if needed.  PAIN MEDICATION CAN BE VERY CONSTIPATING.  Take a stool softener or laxative such as senokot, pericolace, or milk of magnesia if needed.     Last pain medication given at    If your physician has prescribed pain medication that includes Acetaminophen (Tylenol), do not take additional Acetaminophen (Tylenol) while taking the prescribed medication.

## 2025-04-23 ENCOUNTER — APPOINTMENT (OUTPATIENT)
Dept: PHYSICAL THERAPY | Facility: MEDICAL CENTER | Age: 60
End: 2025-04-23
Attending: NURSE PRACTITIONER
Payer: COMMERCIAL

## 2025-04-29 ENCOUNTER — HOSPITAL ENCOUNTER (OUTPATIENT)
Facility: MEDICAL CENTER | Age: 60
End: 2025-04-30
Attending: ORTHOPAEDIC SURGERY | Admitting: ORTHOPAEDIC SURGERY
Payer: COMMERCIAL

## 2025-04-29 ENCOUNTER — ANESTHESIA (OUTPATIENT)
Dept: SURGERY | Facility: MEDICAL CENTER | Age: 60
End: 2025-04-29
Payer: COMMERCIAL

## 2025-04-29 ENCOUNTER — ANESTHESIA EVENT (OUTPATIENT)
Dept: SURGERY | Facility: MEDICAL CENTER | Age: 60
End: 2025-04-29
Payer: COMMERCIAL

## 2025-04-29 DIAGNOSIS — G89.18 POST-OPERATIVE PAIN: ICD-10-CM

## 2025-04-29 DIAGNOSIS — Z96.652 S/P TOTAL KNEE ARTHROPLASTY, LEFT: ICD-10-CM

## 2025-04-29 DIAGNOSIS — M17.12 PRIMARY OSTEOARTHRITIS OF LEFT KNEE: ICD-10-CM

## 2025-04-29 PROCEDURE — 700101 HCHG RX REV CODE 250: Performed by: ORTHOPAEDIC SURGERY

## 2025-04-29 PROCEDURE — 27447 TOTAL KNEE ARTHROPLASTY: CPT | Mod: ASROC,LT | Performed by: STUDENT IN AN ORGANIZED HEALTH CARE EDUCATION/TRAINING PROGRAM

## 2025-04-29 PROCEDURE — C1776 JOINT DEVICE (IMPLANTABLE): HCPCS | Performed by: ORTHOPAEDIC SURGERY

## 2025-04-29 PROCEDURE — 64447 NJX AA&/STRD FEMORAL NRV IMG: CPT | Performed by: ORTHOPAEDIC SURGERY

## 2025-04-29 PROCEDURE — 700111 HCHG RX REV CODE 636 W/ 250 OVERRIDE (IP): Performed by: STUDENT IN AN ORGANIZED HEALTH CARE EDUCATION/TRAINING PROGRAM

## 2025-04-29 PROCEDURE — 502000 HCHG MISC OR IMPLANTS RC 0278: Performed by: ORTHOPAEDIC SURGERY

## 2025-04-29 PROCEDURE — A9270 NON-COVERED ITEM OR SERVICE: HCPCS | Performed by: STUDENT IN AN ORGANIZED HEALTH CARE EDUCATION/TRAINING PROGRAM

## 2025-04-29 PROCEDURE — 770030 HCHG ROOM/CARE - EXTENDED RECOVERY EACH 15 MIN

## 2025-04-29 PROCEDURE — 700111 HCHG RX REV CODE 636 W/ 250 OVERRIDE (IP): Mod: JZ | Performed by: STUDENT IN AN ORGANIZED HEALTH CARE EDUCATION/TRAINING PROGRAM

## 2025-04-29 PROCEDURE — 700111 HCHG RX REV CODE 636 W/ 250 OVERRIDE (IP): Mod: JZ | Performed by: ORTHOPAEDIC SURGERY

## 2025-04-29 PROCEDURE — 27447 TOTAL KNEE ARTHROPLASTY: CPT | Mod: LT | Performed by: ORTHOPAEDIC SURGERY

## 2025-04-29 PROCEDURE — 160036 HCHG PACU - EA ADDL 30 MINS PHASE I: Performed by: ORTHOPAEDIC SURGERY

## 2025-04-29 PROCEDURE — 700105 HCHG RX REV CODE 258: Performed by: ORTHOPAEDIC SURGERY

## 2025-04-29 PROCEDURE — 160048 HCHG OR STATISTICAL LEVEL 1-5: Performed by: ORTHOPAEDIC SURGERY

## 2025-04-29 PROCEDURE — 160009 HCHG ANES TIME/MIN: Performed by: ORTHOPAEDIC SURGERY

## 2025-04-29 PROCEDURE — 160035 HCHG PACU - 1ST 60 MINS PHASE I: Performed by: ORTHOPAEDIC SURGERY

## 2025-04-29 PROCEDURE — 700102 HCHG RX REV CODE 250 W/ 637 OVERRIDE(OP): Performed by: STUDENT IN AN ORGANIZED HEALTH CARE EDUCATION/TRAINING PROGRAM

## 2025-04-29 PROCEDURE — 160015 HCHG STAT PREOP MINUTES: Performed by: ORTHOPAEDIC SURGERY

## 2025-04-29 PROCEDURE — 160041 HCHG SURGERY MINUTES - EA ADDL 1 MIN LEVEL 4: Performed by: ORTHOPAEDIC SURGERY

## 2025-04-29 PROCEDURE — 94760 N-INVAS EAR/PLS OXIMETRY 1: CPT

## 2025-04-29 PROCEDURE — 160002 HCHG RECOVERY MINUTES (STAT): Performed by: ORTHOPAEDIC SURGERY

## 2025-04-29 PROCEDURE — 700101 HCHG RX REV CODE 250: Performed by: STUDENT IN AN ORGANIZED HEALTH CARE EDUCATION/TRAINING PROGRAM

## 2025-04-29 PROCEDURE — 700105 HCHG RX REV CODE 258: Performed by: STUDENT IN AN ORGANIZED HEALTH CARE EDUCATION/TRAINING PROGRAM

## 2025-04-29 PROCEDURE — 160029 HCHG SURGERY MINUTES - 1ST 30 MINS LEVEL 4: Performed by: ORTHOPAEDIC SURGERY

## 2025-04-29 DEVICE — IMPLANTABLE DEVICE: Type: IMPLANTABLE DEVICE | Site: KNEE | Status: FUNCTIONAL

## 2025-04-29 DEVICE — COMPONENT PATELLAR TRAITHLON TRITANIUM ASYMMETRIC 32X10MM (1EA): Type: IMPLANTABLE DEVICE | Site: KNEE | Status: FUNCTIONAL

## 2025-04-29 DEVICE — BASEPLATE TIBIAL TRIATHLON TRITANIUM SIZE 3 (1EA): Type: IMPLANTABLE DEVICE | Site: KNEE | Status: FUNCTIONAL

## 2025-04-29 DEVICE — COMPONENT FEMORAL TRIATHLON CRUCIATE RETAIN LEFT SIZE 4 (1EA): Type: IMPLANTABLE DEVICE | Site: KNEE | Status: FUNCTIONAL

## 2025-04-29 RX ORDER — HYDROCODONE BITARTRATE AND ACETAMINOPHEN 7.5; 325 MG/15ML; MG/15ML
15 SOLUTION ORAL
Status: COMPLETED | OUTPATIENT
Start: 2025-04-29 | End: 2025-04-29

## 2025-04-29 RX ORDER — SCOPOLAMINE 1 MG/3D
1 PATCH, EXTENDED RELEASE TRANSDERMAL
Status: DISCONTINUED | OUTPATIENT
Start: 2025-04-29 | End: 2025-04-30 | Stop reason: HOSPADM

## 2025-04-29 RX ORDER — IBUPROFEN 400 MG/1
800 TABLET, FILM COATED ORAL 3 TIMES DAILY PRN
Status: DISCONTINUED | OUTPATIENT
Start: 2025-05-02 | End: 2025-04-30 | Stop reason: HOSPADM

## 2025-04-29 RX ORDER — KETOROLAC TROMETHAMINE 15 MG/ML
15 INJECTION, SOLUTION INTRAMUSCULAR; INTRAVENOUS EVERY 6 HOURS
Status: DISCONTINUED | OUTPATIENT
Start: 2025-04-29 | End: 2025-04-30 | Stop reason: HOSPADM

## 2025-04-29 RX ORDER — HALOPERIDOL 5 MG/ML
1 INJECTION INTRAMUSCULAR EVERY 6 HOURS PRN
Status: DISCONTINUED | OUTPATIENT
Start: 2025-04-29 | End: 2025-04-30 | Stop reason: HOSPADM

## 2025-04-29 RX ORDER — DEXAMETHASONE SODIUM PHOSPHATE 4 MG/ML
INJECTION, SOLUTION INTRA-ARTICULAR; INTRALESIONAL; INTRAMUSCULAR; INTRAVENOUS; SOFT TISSUE
Status: COMPLETED | OUTPATIENT
Start: 2025-04-29 | End: 2025-04-29

## 2025-04-29 RX ORDER — LEVOTHYROXINE SODIUM 75 UG/1
150 TABLET ORAL
Status: DISCONTINUED | OUTPATIENT
Start: 2025-04-30 | End: 2025-04-30 | Stop reason: HOSPADM

## 2025-04-29 RX ORDER — VANCOMYCIN HYDROCHLORIDE 1 G/20ML
INJECTION, POWDER, LYOPHILIZED, FOR SOLUTION INTRAVENOUS
Status: COMPLETED | OUTPATIENT
Start: 2025-04-29 | End: 2025-04-29

## 2025-04-29 RX ORDER — TRAMADOL HYDROCHLORIDE 50 MG/1
50 TABLET ORAL EVERY 4 HOURS PRN
Status: DISCONTINUED | OUTPATIENT
Start: 2025-04-29 | End: 2025-04-30 | Stop reason: HOSPADM

## 2025-04-29 RX ORDER — HYDROMORPHONE HYDROCHLORIDE 2 MG/ML
INJECTION, SOLUTION INTRAMUSCULAR; INTRAVENOUS; SUBCUTANEOUS PRN
Status: DISCONTINUED | OUTPATIENT
Start: 2025-04-29 | End: 2025-04-29 | Stop reason: SURG

## 2025-04-29 RX ORDER — MIDAZOLAM HYDROCHLORIDE 1 MG/ML
INJECTION INTRAMUSCULAR; INTRAVENOUS PRN
Status: DISCONTINUED | OUTPATIENT
Start: 2025-04-29 | End: 2025-04-29 | Stop reason: SURG

## 2025-04-29 RX ORDER — ACETAMINOPHEN 500 MG
1000 TABLET ORAL EVERY 6 HOURS
Status: DISCONTINUED | OUTPATIENT
Start: 2025-04-29 | End: 2025-04-30 | Stop reason: HOSPADM

## 2025-04-29 RX ORDER — SODIUM CHLORIDE 9 MG/ML
INJECTION, SOLUTION INTRAMUSCULAR; INTRAVENOUS; SUBCUTANEOUS
Status: DISCONTINUED | OUTPATIENT
Start: 2025-04-29 | End: 2025-04-29 | Stop reason: HOSPADM

## 2025-04-29 RX ORDER — SODIUM CHLORIDE, SODIUM LACTATE, POTASSIUM CHLORIDE, CALCIUM CHLORIDE 600; 310; 30; 20 MG/100ML; MG/100ML; MG/100ML; MG/100ML
INJECTION, SOLUTION INTRAVENOUS CONTINUOUS
Status: ACTIVE | OUTPATIENT
Start: 2025-04-29 | End: 2025-04-29

## 2025-04-29 RX ORDER — DIPHENHYDRAMINE HYDROCHLORIDE 50 MG/ML
25 INJECTION, SOLUTION INTRAMUSCULAR; INTRAVENOUS EVERY 6 HOURS PRN
Status: DISCONTINUED | OUTPATIENT
Start: 2025-04-29 | End: 2025-04-30 | Stop reason: HOSPADM

## 2025-04-29 RX ORDER — HALOPERIDOL 5 MG/ML
1 INJECTION INTRAMUSCULAR
Status: DISCONTINUED | OUTPATIENT
Start: 2025-04-29 | End: 2025-04-29 | Stop reason: HOSPADM

## 2025-04-29 RX ORDER — OMEPRAZOLE 20 MG/1
20 CAPSULE, DELAYED RELEASE ORAL 2 TIMES DAILY PRN
Status: DISCONTINUED | OUTPATIENT
Start: 2025-04-29 | End: 2025-04-30 | Stop reason: HOSPADM

## 2025-04-29 RX ORDER — CEFAZOLIN SODIUM 1 G/3ML
2 INJECTION, POWDER, FOR SOLUTION INTRAMUSCULAR; INTRAVENOUS ONCE
Status: DISCONTINUED | OUTPATIENT
Start: 2025-04-29 | End: 2025-04-29 | Stop reason: HOSPADM

## 2025-04-29 RX ORDER — CEFAZOLIN SODIUM 1 G/3ML
INJECTION, POWDER, FOR SOLUTION INTRAMUSCULAR; INTRAVENOUS PRN
Status: DISCONTINUED | OUTPATIENT
Start: 2025-04-29 | End: 2025-04-29 | Stop reason: SURG

## 2025-04-29 RX ORDER — KETOROLAC TROMETHAMINE 15 MG/ML
INJECTION, SOLUTION INTRAMUSCULAR; INTRAVENOUS PRN
Status: DISCONTINUED | OUTPATIENT
Start: 2025-04-29 | End: 2025-04-29 | Stop reason: SURG

## 2025-04-29 RX ORDER — KETOROLAC TROMETHAMINE 30 MG/ML
INJECTION, SOLUTION INTRAMUSCULAR; INTRAVENOUS
Status: DISCONTINUED | OUTPATIENT
Start: 2025-04-29 | End: 2025-04-29 | Stop reason: HOSPADM

## 2025-04-29 RX ORDER — HYDROMORPHONE HYDROCHLORIDE 1 MG/ML
0.5 INJECTION, SOLUTION INTRAMUSCULAR; INTRAVENOUS; SUBCUTANEOUS
Status: DISCONTINUED | OUTPATIENT
Start: 2025-04-29 | End: 2025-04-30 | Stop reason: HOSPADM

## 2025-04-29 RX ORDER — ACETAMINOPHEN 500 MG
1000 TABLET ORAL EVERY 6 HOURS PRN
Status: DISCONTINUED | OUTPATIENT
Start: 2025-05-04 | End: 2025-04-30 | Stop reason: HOSPADM

## 2025-04-29 RX ORDER — EPHEDRINE SULFATE 50 MG/ML
5 INJECTION, SOLUTION INTRAVENOUS
Status: DISCONTINUED | OUTPATIENT
Start: 2025-04-29 | End: 2025-04-29 | Stop reason: HOSPADM

## 2025-04-29 RX ORDER — BUPIVACAINE HYDROCHLORIDE AND EPINEPHRINE 2.5; 5 MG/ML; UG/ML
INJECTION, SOLUTION EPIDURAL; INFILTRATION; INTRACAUDAL; PERINEURAL
Status: COMPLETED | OUTPATIENT
Start: 2025-04-29 | End: 2025-04-29

## 2025-04-29 RX ORDER — OXYCODONE HYDROCHLORIDE 10 MG/1
10 TABLET ORAL
Status: DISCONTINUED | OUTPATIENT
Start: 2025-04-29 | End: 2025-04-30 | Stop reason: HOSPADM

## 2025-04-29 RX ORDER — LABETALOL HYDROCHLORIDE 5 MG/ML
5 INJECTION, SOLUTION INTRAVENOUS
Status: DISCONTINUED | OUTPATIENT
Start: 2025-04-29 | End: 2025-04-29 | Stop reason: HOSPADM

## 2025-04-29 RX ORDER — TRANEXAMIC ACID 100 MG/ML
INJECTION, SOLUTION INTRAVENOUS PRN
Status: DISCONTINUED | OUTPATIENT
Start: 2025-04-29 | End: 2025-04-29 | Stop reason: SURG

## 2025-04-29 RX ORDER — EPHEDRINE SULFATE 50 MG/ML
INJECTION, SOLUTION INTRAVENOUS PRN
Status: DISCONTINUED | OUTPATIENT
Start: 2025-04-29 | End: 2025-04-29 | Stop reason: SURG

## 2025-04-29 RX ORDER — SODIUM PHOSPHATE,MONO-DIBASIC 19G-7G/118
1 ENEMA (ML) RECTAL
Status: DISCONTINUED | OUTPATIENT
Start: 2025-04-29 | End: 2025-04-30 | Stop reason: HOSPADM

## 2025-04-29 RX ORDER — ROCURONIUM BROMIDE 10 MG/ML
INJECTION, SOLUTION INTRAVENOUS PRN
Status: DISCONTINUED | OUTPATIENT
Start: 2025-04-29 | End: 2025-04-29 | Stop reason: SURG

## 2025-04-29 RX ORDER — OXYCODONE HYDROCHLORIDE 5 MG/1
5 TABLET ORAL
Status: DISCONTINUED | OUTPATIENT
Start: 2025-04-29 | End: 2025-04-30 | Stop reason: HOSPADM

## 2025-04-29 RX ORDER — POLYETHYLENE GLYCOL 3350 17 G/17G
1 POWDER, FOR SOLUTION ORAL 2 TIMES DAILY PRN
Status: DISCONTINUED | OUTPATIENT
Start: 2025-04-29 | End: 2025-04-30 | Stop reason: HOSPADM

## 2025-04-29 RX ORDER — AMOXICILLIN 250 MG
1 CAPSULE ORAL
Status: DISCONTINUED | OUTPATIENT
Start: 2025-04-29 | End: 2025-04-30 | Stop reason: HOSPADM

## 2025-04-29 RX ORDER — ONDANSETRON 2 MG/ML
4 INJECTION INTRAMUSCULAR; INTRAVENOUS
Status: DISCONTINUED | OUTPATIENT
Start: 2025-04-29 | End: 2025-04-29 | Stop reason: HOSPADM

## 2025-04-29 RX ORDER — HYDROCODONE BITARTRATE AND ACETAMINOPHEN 7.5; 325 MG/15ML; MG/15ML
30 SOLUTION ORAL
Status: COMPLETED | OUTPATIENT
Start: 2025-04-29 | End: 2025-04-29

## 2025-04-29 RX ORDER — DIPHENHYDRAMINE HYDROCHLORIDE 50 MG/ML
12.5 INJECTION, SOLUTION INTRAMUSCULAR; INTRAVENOUS
Status: DISCONTINUED | OUTPATIENT
Start: 2025-04-29 | End: 2025-04-29 | Stop reason: HOSPADM

## 2025-04-29 RX ORDER — ASPIRIN 81 MG/1
81 TABLET ORAL 2 TIMES DAILY
Status: DISCONTINUED | OUTPATIENT
Start: 2025-04-30 | End: 2025-04-30 | Stop reason: HOSPADM

## 2025-04-29 RX ORDER — DIPHENHYDRAMINE HCL 25 MG
25 TABLET ORAL EVERY 6 HOURS PRN
Status: DISCONTINUED | OUTPATIENT
Start: 2025-04-29 | End: 2025-04-30 | Stop reason: HOSPADM

## 2025-04-29 RX ORDER — MIDAZOLAM HYDROCHLORIDE 1 MG/ML
1 INJECTION INTRAMUSCULAR; INTRAVENOUS
Status: DISCONTINUED | OUTPATIENT
Start: 2025-04-29 | End: 2025-04-29 | Stop reason: HOSPADM

## 2025-04-29 RX ORDER — BISACODYL 10 MG
10 SUPPOSITORY, RECTAL RECTAL
Status: DISCONTINUED | OUTPATIENT
Start: 2025-04-29 | End: 2025-04-30 | Stop reason: HOSPADM

## 2025-04-29 RX ORDER — METOPROLOL TARTRATE 1 MG/ML
INJECTION, SOLUTION INTRAVENOUS PRN
Status: DISCONTINUED | OUTPATIENT
Start: 2025-04-29 | End: 2025-04-29 | Stop reason: SURG

## 2025-04-29 RX ORDER — ONDANSETRON 2 MG/ML
4 INJECTION INTRAMUSCULAR; INTRAVENOUS EVERY 4 HOURS PRN
Status: DISCONTINUED | OUTPATIENT
Start: 2025-04-29 | End: 2025-04-30 | Stop reason: HOSPADM

## 2025-04-29 RX ORDER — ONDANSETRON 2 MG/ML
INJECTION INTRAMUSCULAR; INTRAVENOUS PRN
Status: DISCONTINUED | OUTPATIENT
Start: 2025-04-29 | End: 2025-04-29 | Stop reason: SURG

## 2025-04-29 RX ORDER — DIPHENHYDRAMINE HCL 25 MG
25 TABLET ORAL NIGHTLY PRN
Status: DISCONTINUED | OUTPATIENT
Start: 2025-04-30 | End: 2025-04-30 | Stop reason: HOSPADM

## 2025-04-29 RX ORDER — DEXAMETHASONE SODIUM PHOSPHATE 4 MG/ML
4 INJECTION, SOLUTION INTRA-ARTICULAR; INTRALESIONAL; INTRAMUSCULAR; INTRAVENOUS; SOFT TISSUE
Status: DISCONTINUED | OUTPATIENT
Start: 2025-04-29 | End: 2025-04-30 | Stop reason: HOSPADM

## 2025-04-29 RX ORDER — HYDRALAZINE HYDROCHLORIDE 20 MG/ML
5 INJECTION INTRAMUSCULAR; INTRAVENOUS
Status: DISCONTINUED | OUTPATIENT
Start: 2025-04-29 | End: 2025-04-29 | Stop reason: HOSPADM

## 2025-04-29 RX ORDER — DOCUSATE SODIUM 100 MG/1
100 CAPSULE, LIQUID FILLED ORAL 2 TIMES DAILY
Status: DISCONTINUED | OUTPATIENT
Start: 2025-04-29 | End: 2025-04-30 | Stop reason: HOSPADM

## 2025-04-29 RX ORDER — IPRATROPIUM BROMIDE AND ALBUTEROL SULFATE 2.5; .5 MG/3ML; MG/3ML
3 SOLUTION RESPIRATORY (INHALATION)
Status: DISCONTINUED | OUTPATIENT
Start: 2025-04-29 | End: 2025-04-29 | Stop reason: HOSPADM

## 2025-04-29 RX ORDER — AMOXICILLIN 250 MG
1 CAPSULE ORAL NIGHTLY
Status: DISCONTINUED | OUTPATIENT
Start: 2025-04-29 | End: 2025-04-30 | Stop reason: HOSPADM

## 2025-04-29 RX ORDER — EPINEPHRINE 1 MG/ML(1)
AMPUL (ML) INJECTION
Status: DISCONTINUED | OUTPATIENT
Start: 2025-04-29 | End: 2025-04-29 | Stop reason: HOSPADM

## 2025-04-29 RX ORDER — ROPIVACAINE HYDROCHLORIDE 5 MG/ML
INJECTION, SOLUTION EPIDURAL; INFILTRATION; PERINEURAL
Status: DISCONTINUED | OUTPATIENT
Start: 2025-04-29 | End: 2025-04-29 | Stop reason: HOSPADM

## 2025-04-29 RX ORDER — LIDOCAINE HYDROCHLORIDE 20 MG/ML
INJECTION, SOLUTION EPIDURAL; INFILTRATION; INTRACAUDAL; PERINEURAL PRN
Status: DISCONTINUED | OUTPATIENT
Start: 2025-04-29 | End: 2025-04-29 | Stop reason: SURG

## 2025-04-29 RX ORDER — ACETAMINOPHEN 500 MG
1000 TABLET ORAL ONCE
Status: COMPLETED | OUTPATIENT
Start: 2025-04-29 | End: 2025-04-29

## 2025-04-29 RX ADMIN — KETOROLAC TROMETHAMINE 15 MG: 15 INJECTION, SOLUTION INTRAMUSCULAR; INTRAVENOUS at 20:32

## 2025-04-29 RX ADMIN — METOPROLOL TARTRATE 5 MG: 5 INJECTION INTRAVENOUS at 13:37

## 2025-04-29 RX ADMIN — HYDROMORPHONE HYDROCHLORIDE 1 MG: 2 INJECTION INTRAMUSCULAR; INTRAVENOUS; SUBCUTANEOUS at 13:14

## 2025-04-29 RX ADMIN — SUGAMMADEX 200 MG: 100 INJECTION, SOLUTION INTRAVENOUS at 14:31

## 2025-04-29 RX ADMIN — BUPIVACAINE HYDROCHLORIDE AND EPINEPHRINE 15 ML: 2.5; 5 INJECTION, SOLUTION EPIDURAL; INFILTRATION; INTRACAUDAL; PERINEURAL at 12:55

## 2025-04-29 RX ADMIN — SENNOSIDES AND DOCUSATE SODIUM 1 TABLET: 50; 8.6 TABLET ORAL at 20:32

## 2025-04-29 RX ADMIN — DEXAMETHASONE SODIUM PHOSPHATE 4 MG: 4 INJECTION, SOLUTION INTRA-ARTICULAR; INTRALESIONAL; INTRAMUSCULAR; INTRAVENOUS; SOFT TISSUE at 12:55

## 2025-04-29 RX ADMIN — ROCURONIUM BROMIDE 50 MG: 10 INJECTION INTRAVENOUS at 13:31

## 2025-04-29 RX ADMIN — PROPOFOL 150 MG: 10 INJECTION, EMULSION INTRAVENOUS at 13:14

## 2025-04-29 RX ADMIN — HYDROMORPHONE HYDROCHLORIDE 0.5 MG: 2 INJECTION INTRAMUSCULAR; INTRAVENOUS; SUBCUTANEOUS at 13:28

## 2025-04-29 RX ADMIN — DOCUSATE SODIUM 100 MG: 100 CAPSULE, LIQUID FILLED ORAL at 20:32

## 2025-04-29 RX ADMIN — SODIUM CHLORIDE, POTASSIUM CHLORIDE, SODIUM LACTATE AND CALCIUM CHLORIDE: 600; 310; 30; 20 INJECTION, SOLUTION INTRAVENOUS at 12:28

## 2025-04-29 RX ADMIN — PROPOFOL 20 MG: 10 INJECTION, EMULSION INTRAVENOUS at 14:32

## 2025-04-29 RX ADMIN — HYDROCODONE BITARTRATE AND ACETAMINOPHEN 7.5 MG: 7.5; 325 SOLUTION ORAL at 18:17

## 2025-04-29 RX ADMIN — MIDAZOLAM HYDROCHLORIDE 2 MG: 1 INJECTION, SOLUTION INTRAMUSCULAR; INTRAVENOUS at 12:55

## 2025-04-29 RX ADMIN — KETOROLAC TROMETHAMINE 15 MG: 15 INJECTION, SOLUTION INTRAMUSCULAR; INTRAVENOUS at 14:13

## 2025-04-29 RX ADMIN — HYDROMORPHONE HYDROCHLORIDE 0.5 MG: 2 INJECTION INTRAMUSCULAR; INTRAVENOUS; SUBCUTANEOUS at 13:25

## 2025-04-29 RX ADMIN — LIDOCAINE HYDROCHLORIDE 100 MG: 20 INJECTION, SOLUTION EPIDURAL; INFILTRATION; INTRACAUDAL; PERINEURAL at 13:14

## 2025-04-29 RX ADMIN — BUPIVACAINE HYDROCHLORIDE AND EPINEPHRINE BITARTRATE 15 ML: 2.5; .0091 INJECTION, SOLUTION EPIDURAL; INFILTRATION; INTRACAUDAL; PERINEURAL at 12:55

## 2025-04-29 RX ADMIN — PROPOFOL 30 MG: 10 INJECTION, EMULSION INTRAVENOUS at 14:28

## 2025-04-29 RX ADMIN — EPHEDRINE SULFATE 10 MG: 50 INJECTION, SOLUTION INTRAVENOUS at 13:20

## 2025-04-29 RX ADMIN — CEFAZOLIN 3 G: 1 INJECTION, POWDER, FOR SOLUTION INTRAMUSCULAR; INTRAVENOUS at 13:14

## 2025-04-29 RX ADMIN — DEXAMETHASONE SODIUM PHOSPHATE 4 MG: 4 INJECTION INTRA-ARTICULAR; INTRALESIONAL; INTRAMUSCULAR; INTRAVENOUS; SOFT TISSUE at 12:55

## 2025-04-29 RX ADMIN — ACETAMINOPHEN 1000 MG: 500 TABLET ORAL at 20:32

## 2025-04-29 RX ADMIN — ONDANSETRON 4 MG: 2 INJECTION INTRAMUSCULAR; INTRAVENOUS at 14:13

## 2025-04-29 RX ADMIN — TRANEXAMIC ACID 1000 MG: 100 INJECTION, SOLUTION INTRAVENOUS at 13:16

## 2025-04-29 RX ADMIN — CEFAZOLIN 2 G: 2 INJECTION, POWDER, FOR SOLUTION INTRAMUSCULAR; INTRAVENOUS at 18:38

## 2025-04-29 RX ADMIN — ACETAMINOPHEN 1000 MG: 500 TABLET, FILM COATED ORAL at 12:28

## 2025-04-29 SDOH — ECONOMIC STABILITY: TRANSPORTATION INSECURITY
IN THE PAST 12 MONTHS, HAS THE LACK OF TRANSPORTATION KEPT YOU FROM MEDICAL APPOINTMENTS OR FROM GETTING MEDICATIONS?: NO

## 2025-04-29 SDOH — ECONOMIC STABILITY: TRANSPORTATION INSECURITY
IN THE PAST 12 MONTHS, HAS LACK OF RELIABLE TRANSPORTATION KEPT YOU FROM MEDICAL APPOINTMENTS, MEETINGS, WORK OR FROM GETTING THINGS NEEDED FOR DAILY LIVING?: NO

## 2025-04-29 ASSESSMENT — FIBROSIS 4 INDEX
FIB4 SCORE: 0.79
FIB4 SCORE: 0.79

## 2025-04-29 ASSESSMENT — PAIN DESCRIPTION - PAIN TYPE
TYPE: SURGICAL PAIN
TYPE: SURGICAL PAIN
TYPE: CHRONIC PAIN
TYPE: SURGICAL PAIN

## 2025-04-29 ASSESSMENT — COGNITIVE AND FUNCTIONAL STATUS - GENERAL
TOILETING: A LITTLE
MOBILITY SCORE: 21
HELP NEEDED FOR BATHING: A LITTLE
SUGGESTED CMS G CODE MODIFIER MOBILITY: CJ
SUGGESTED CMS G CODE MODIFIER DAILY ACTIVITY: CJ
DRESSING REGULAR LOWER BODY CLOTHING: A LITTLE
WALKING IN HOSPITAL ROOM: A LITTLE
STANDING UP FROM CHAIR USING ARMS: A LITTLE
DAILY ACTIVITIY SCORE: 21
CLIMB 3 TO 5 STEPS WITH RAILING: A LITTLE

## 2025-04-29 ASSESSMENT — SOCIAL DETERMINANTS OF HEALTH (SDOH)
WITHIN THE PAST 12 MONTHS, THE FOOD YOU BOUGHT JUST DIDN'T LAST AND YOU DIDN'T HAVE MONEY TO GET MORE: NEVER TRUE
WITHIN THE PAST 12 MONTHS, YOU WORRIED THAT YOUR FOOD WOULD RUN OUT BEFORE YOU GOT THE MONEY TO BUY MORE: NEVER TRUE
WITHIN THE LAST YEAR, HAVE TO BEEN RAPED OR FORCED TO HAVE ANY KIND OF SEXUAL ACTIVITY BY YOUR PARTNER OR EX-PARTNER?: NO
IN THE PAST 12 MONTHS, HAS THE ELECTRIC, GAS, OIL, OR WATER COMPANY THREATENED TO SHUT OFF SERVICE IN YOUR HOME?: NO
WITHIN THE LAST YEAR, HAVE YOU BEEN HUMILIATED OR EMOTIONALLY ABUSED IN OTHER WAYS BY YOUR PARTNER OR EX-PARTNER?: NO
WITHIN THE LAST YEAR, HAVE YOU BEEN KICKED, HIT, SLAPPED, OR OTHERWISE PHYSICALLY HURT BY YOUR PARTNER OR EX-PARTNER?: NO
WITHIN THE LAST YEAR, HAVE YOU BEEN AFRAID OF YOUR PARTNER OR EX-PARTNER?: NO

## 2025-04-29 ASSESSMENT — LIFESTYLE VARIABLES
CONSUMPTION TOTAL: NEGATIVE
HAVE YOU EVER FELT YOU SHOULD CUT DOWN ON YOUR DRINKING: NO
DOES PATIENT WANT TO STOP DRINKING: NO
HOW MANY TIMES IN THE PAST YEAR HAVE YOU HAD 5 OR MORE DRINKS IN A DAY: 0
EVER FELT BAD OR GUILTY ABOUT YOUR DRINKING: NO
ALCOHOL_USE: NO
AVERAGE NUMBER OF DAYS PER WEEK YOU HAVE A DRINK CONTAINING ALCOHOL: 0
TOTAL SCORE: 0
TOTAL SCORE: 0
ON A TYPICAL DAY WHEN YOU DRINK ALCOHOL HOW MANY DRINKS DO YOU HAVE: 0
EVER HAD A DRINK FIRST THING IN THE MORNING TO STEADY YOUR NERVES TO GET RID OF A HANGOVER: NO
TOTAL SCORE: 0
HAVE PEOPLE ANNOYED YOU BY CRITICIZING YOUR DRINKING: NO

## 2025-04-29 ASSESSMENT — PATIENT HEALTH QUESTIONNAIRE - PHQ9
1. LITTLE INTEREST OR PLEASURE IN DOING THINGS: NOT AT ALL
SUM OF ALL RESPONSES TO PHQ9 QUESTIONS 1 AND 2: 0
2. FEELING DOWN, DEPRESSED, IRRITABLE, OR HOPELESS: NOT AT ALL

## 2025-04-29 ASSESSMENT — PAIN SCALES - GENERAL: PAIN_LEVEL: 3

## 2025-04-29 NOTE — ANESTHESIA TIME REPORT
Anesthesia Start and Stop Event Times       Date Time Event    4/29/2025 1309 Ready for Procedure     1309 Anesthesia Start     1441 Anesthesia Stop          Responsible Staff  04/29/25      Name Role Begin End    Davion Red M.D. Anesth 1309 1441          Overtime Reason:  no overtime (within assigned shift)    Comments:

## 2025-04-29 NOTE — ANESTHESIA PROCEDURE NOTES
Peripheral Block    Date/Time: 4/29/2025 12:55 PM    Performed by: Davion Red M.D.  Authorized by: Davion Red M.D.    Patient Location:  Pre-op  Start Time:  4/29/2025 12:55 PM  Reason for Block: at surgeon's request and post-op pain management ONLY    patient identified, IV checked, site marked, risks and benefits discussed, surgical consent, monitors and equipment checked, pre-op evaluation and timeout performed    Patient Position:  Supine  Prep: ChloraPrep    Monitoring:  Heart rate, continuous pulse ox and cardiac monitor  Block Region:  Lower Extremity  Lower Extremity - Block Type:  Other  Other Block Type: AFCN  Laterality:  Left  Procedures: ultrasound guided  Image captured, interpreted and electronically stored.  Block Type:  Single-shot  Needle Length:  100mm  Needle Gauge:  21 G  Needle Localization:  Ultrasound guidance  Ultrasound picture in chart  Injection Assessment:  Negative aspiration for heme, no paresthesia on injection, incremental injection and local visualized surrounding nerve on ultrasound  Evidence of intravascular injection: No     Injected local in same view as Adductor canal block, the plane above sartorius

## 2025-04-29 NOTE — ANESTHESIA POSTPROCEDURE EVALUATION
Patient: Antonella Frausto    Procedure Summary       Date: 04/29/25 Room / Location:  OR 06 / SURGERY [facility]    Anesthesia Start: 1309 Anesthesia Stop: 1441    Procedure: LEFT TOTAL KNEE ARTHROPLASTY (Left: Knee) Diagnosis:       Primary osteoarthritis of left knee      (Primary osteoarthritis of left knee [M17.12])    Surgeons: Kvng Villatoro M.D. Responsible Provider: Davion Red M.D.    Anesthesia Type: general, peripheral nerve block ASA Status: 3            Final Anesthesia Type: general, peripheral nerve block  Last vitals  BP   Blood Pressure: 129/78    Temp   36.3 °C (97.3 °F)    Pulse   72   Resp   12    SpO2   98 %      Anesthesia Post Evaluation    Patient location during evaluation: PACU  Patient participation: complete - patient participated  Level of consciousness: awake and alert  Pain score: 3    Airway patency: patent  Anesthetic complications: no  Cardiovascular status: hemodynamically stable  Respiratory status: acceptable  Hydration status: euvolemic    PONV: none          There were no known notable events for this encounter.     Nurse Pain Score: 3 (NPRS)

## 2025-04-29 NOTE — ANESTHESIA PREPROCEDURE EVALUATION
Case: [id number] Date/Time: 04/29/25 1330    Procedure: LEFT TOTAL KNEE ARTHROPLASTY    Diagnosis: Primary osteoarthritis of left knee [M17.12]    Pre-op diagnosis: Primary osteoarthritis of left knee [M17.12]    Location: [place] [address] / SURGERY [facility]    Surgeons: Kvng Villatoro M.D.            Relevant Problems   ANESTHESIA (within normal limits)      PULMONARY   (positive) Shortness of breath      CARDIAC (within normal limits)      ENDO   (positive) Postoperative hypothyroidism      Other   (positive) Morbid obesity due to excess calories (HCC)   (positive) Osteoarthritis of foot   (positive) Osteoarthritis, knee   (positive) Primary osteoarthritis of left knee   (positive) Primary osteoarthritis of right knee       Physical Exam    Airway   Mallampati: III  TM distance: >3 FB  Neck ROM: full       Cardiovascular - normal exam  Rhythm: regular  Rate: normal  (-) murmur     Dental    Pulmonary - normal exam     Abdominal   (+) obese     Neurological - normal exam                   Anesthesia Plan    ASA 3   ASA physical status 3 criteria: morbid obesity - BMI greater than or equal to 40    Plan - general and peripheral nerve block     Peripheral nerve block will be post-op pain control  Airway plan will be LMA          Induction: intravenous    Postoperative Plan: Postoperative administration of opioids is intended.    Pertinent diagnostic labs and testing reviewed    Informed Consent:    Anesthetic plan and risks discussed with patient.    Use of blood products discussed with: patient whom consented to blood products.

## 2025-04-29 NOTE — ANESTHESIA PROCEDURE NOTES
Airway    Date/Time: 4/29/2025 1:15 PM    Performed by: Davion Red M.D.  Authorized by: Davion Red M.D.    Location:  OR  Urgency:  Elective  Difficult Airway: No    Indications for Airway Management:  Anesthesia      Spontaneous Ventilation: absent    Sedation Level:  Deep  Preoxygenated: Yes    Mask Difficulty Assessment:  0 - not attempted  Final Airway Type:  Supraglottic airway  Final Supraglottic Airway:  Standard LMA    SGA Size:  4  Number of Attempts at Approach:  1

## 2025-04-29 NOTE — OR NURSING
1437 Admitted from OR via [place]. O2 at 6liters by mask no resp distress. Left knee dressing intact no drainage pedal pulse intact extremity pink and warm.    1500 Opens eyes to name mask removed now on room air.    1515 Sitting up on gurney tolerating fluids denies pain. Educated on IS able to pull 2500.     1530 Pt  Rashid updated via phone. O2 turned down to 1 liter by cannula.    1540 On room air trial.    1600 Resting eyes closed.    1605 Pulse ox 60-70 encouraged deep breathing o2 at 1 liter by cannula restarted sat returned to 100%    1635 Pt sat dropped 8os placed back on 1 liter requested bed for admit.    1650 Pt  updated pt to be admitted to floor will call with room number.  1715 Awake  at bedside.    1740 Doing excercises in bed lifting leg off gurney able to bend knee.    1850 Report called to Ellen. Verbalized relief of pain.

## 2025-04-29 NOTE — OP REPORT
Preop Diagnosis: Advanced left knee arthritis, morbid obesity with lower extremity lipidemia  Postop Diagnosis:  Same  Procedure: Left total knee arthroplasty  Surgeon: Dr. Kvng Villatoro MD  Assistant: Lucas Salcedo PA-C  Anesthesia: Dr. Walker. General + Adductor canal block  Estimated Blood Loss: 50cc  Drains: None  Complications: None      Indications: Antonella Frausto is a 59 y.o. female who has had severe progressive knee pain that failed conservative management.  There were severe degenerative changes on radiographs.  We discussed the options and she was ultimately indicated for knee replacement.  We discussed the risk of bleeding, transfusion, pain, neurovascular injury, stiffness, fracture, infection, wound complication, loosening of the parts over time, blood clots, and medical complication.  No guarantees were made and she elected to proceed.      Procedure:    Findings included severe degenerative changes with varus deformity.  Her leg was very large and heavy with a deep fat layer, making the entire case much harder than usual.  At the end of the case, the knee easily came to full extension and flexed up to calf/thigh impingement with the arthrotomy closed.  The patella tracked centrally.    Informed consent and site marking were confirmed in preop.  An adductor canal block had been performed by the anesthesiologist, and then she was brought back to the OR where anesthesia was performed. Supine positioning was perfmored with all bony prominences well padded with a padded tourniquet on the thigh.  The extremity was prepped and draped in the usual sterile fashion. I performed a pre-procedure timeout to confirm we had the correct patient, side, site, procedure, and presence of necessary personal and equipment.  I confirmed that Ancef and tranexamic acid were given.  The tourniquet was then inflated.    A midline incision was made medial to the tibial tubercle centered over patella taken down to the deep  capsule of the knee. A medial parapatellar arthrotomy was performed.  The fat pad was released. The patella was subluxated and the knee was flexed. The remnants of the anterior horn of the medial and lateral meniscus were removed as well as the ACL from the intercondylar notch. All distal protruding osteophytes were removed. I then drilled and accessed the intramedullary canal of the femur, lavaged it and placed the intramedullary cutting guide. The distal femur was cut in 5 degrees of valgus. I then sized the femur and based rotation off of bony landmarks.  All distal femoral cuts were made.  The tibia was then subluxated forward and cut perpendicular to its long axis.  A spacer block was placed in the knee extension to confirm I had resected enough bone and then sequential releases were performed until there was a rectangular gap.  Collaterals were intact and overall limb alignment was checked and appropriate.  The knee was then tensed at 90 degrees and the meniscal remnants and posterior osteophytes were removed.  The posterior capsule was injected with a local anesthetic cocktail.  The tibia was then subluxed forward, sized, and punched in the appropriate amount of external rotation and mechanical alignment was verified using a drop jose d. Trials were placed, the knee was reduced with a trial insert, it was taken through a range of motion, and found to be stable in the varus/valgus plane 0-30 degrees of flexion and the AP plane at 90 degrees of flexion. Attention was then turned to the patella. A symmetric resection was performed to recreate native patella height and appropriately sized. All extraneous osteophyte and synovium were removed.  With a trial in place, the patella tracked centrally using the no thumbs technique. All trial components were removed. The real components were impacted and the trial liner was place and found to be appropriate.  The tourniquet was let down and hemostasis ensured. The real  insert was placed. Final implants were Mouna Triathlon CR Cementless, size 4 femur, size 3 tibia, with a 16 mm CS insert and 32 oval patella.Stability and patellar tracking were excellent. The knee was then copiously irrigated. One gram of Vancomycin was left in the wound.  The arthrotomy was closed with number 2 running barbed suture, and the wound was closed in layers.  An occlusive silver dressing was applied and the patient was turned over to anesthesia in stable condition without any apparent intraoperative complications.     Plan:  WBAT with a walker (which will need to be provided if they do not already have one due to weakness, imbalance, and fall risk), PT/OT evaluation, Aspirin 81mg BID for 4 weeks for DVT prophylaxis, Leave dressing in place until followup.

## 2025-04-29 NOTE — LETTER
March 24, 2025    Patient Name: Antonella Frausto  Surgeon Name: Kvng Villatoro M.D.  Surgery Facility: [facility] ([address] [place])  Surgery Date: 4/29/2025    The time of your surgery is not final and may change up to and until the day of your surgery. You will be contacted 24-48 hours prior to your surgery date with your check-in and surgery time.    If you will not be at one of the below numbers please call the surgery scheduler at [phone]  Preferred Phone: [phone]    BEFORE YOUR SURGERY   Pre Registration and/or Lab Work must be done within and no earlier than 28 days prior to your surgery date. Your scheduled facility will contact you regarding all required preregistration and/or lab work. If you have not been contacted within 7 days of your scheduled procedure please call [facility] at [phone] for an appointment as soon as possible.    DAY OF YOUR SURGERY  Nothing to eat or drink after midnight     Refrain from smoking any substance after midnight prior to surgery. Smoking may interfere with the anesthetic and frequently produces nausea during the recovery period.    Continue taking all lifesaving medications. Including the morning of your surgery with small sip of water.    Please do NOT take on the day of surgery:  Diuretics: examples- furosemide (Lasix), spironolactone, hydrochlorothiazide  ACE-inhibitors: examples- lisinopril, ramipril, enalapril  “ARBs”: examples- losartan, Olmesartan, valsartan    Please arrive at the hospital/surgery center at the check-in time provided.     An adult will need to bring you and take you home after your surgery.     AFTER YOUR SURGERY  Post op Appointment:   Date: 05/14/2025   Time: 10:30AM   With: Lucas Salcedo PA-C   Location: [address] [place] [address] [place] [address]    - Therapy- Your first appointment should be 4-5  day(s) after your surgery. For your convenience we have 4 Physical Therapy  locations: [facility], [place], and [facility]. Call our office ASAP to schedule an appointment at [phone] or take the enclosed Therapy Prescription to a facility of your choice.  - No dental work for 3-6 months after your surgery.  - Post Surgery - You will need someone to drive you home  - Post Surgery - You will need someone to stay with you for 24 hours  - You must have someone provide transportation post surgery and someone to monitor you for at least 24 hours post-surgery. If you don't have either of these your appointment will be canceled.     TIME OFF WORK  FMLA or Disability forms can be faxed directly to: [phone] or you may drop them off at [address] [place] [address]. Our office charges a $35.00 fee per form. Forms will be completed within 10 business days of drop off and payment received. For the status of your forms you may contact our disability office directly [phone].    MEDICATION INSTRUCTIONS **Please read section completely**  The following medications should be stopped a minimum of 10 days prior to surgery:  All over the counter, Supplements & Herbal medications    Anorectics: Phentermine (Adipex-P, Lomaira and Suprenza), Phentermine-topiramate (Qsymia), Bupropion-naltrexone (Contrave)    **If you are on Bupropion for anxiety/depression, please continue this medication up until the day of surgery.     Opiod Partial Agonists/Opioid Antagonists: Buprenorphine (Suboxone, Belbuca, Butrans, Probuphine Implant, Sublocade), Naltrexone (ReVia, Vivitrol), Naloxone    Amphetamines: Dextroamphetamine/Amphetamine (Adderall, Mydayis), Methylphenidate Hydrochloride (Concerta, Metadate, Methylin, Ritalin)    The following medications should be stopped 5 days prior to surgery:  Blood Thinners: Any Aspirin, Aspirin products, anti-inflammatories such as ibuprofen and any blood thinners such as Coumadin and Plavix. Please consult your prescribing physician if you are on life  saving blood thinners, in regards to when to stop medications prior to surgery.     The following medications should be stopped a minimum of 3 days prior to surgery:  PDE-5 inhibitors: Sildenafil (Viagra), Tadalafil (Cialis), Vardenafil (Levitra), Avanafil (Stendra)    MAO Inhibitors: Rasagiline (Azilect), Selegiline (Eldepryl, Emsam, Selapar), Isocarboxazid (Marplan), Phenelzine (Nardil)

## 2025-04-29 NOTE — ANESTHESIA PROCEDURE NOTES
Peripheral Block    Date/Time: 4/29/2025 12:55 PM    Performed by: Davion Red M.D.  Authorized by: Davion Red M.D.    Patient Location:  Pre-op  Start Time:  4/29/2025 12:55 PM  Reason for Block: at surgeon's request and post-op pain management ONLY    patient identified, IV checked, site marked, risks and benefits discussed, surgical consent, monitors and equipment checked, pre-op evaluation and timeout performed    Patient Position:  Supine  Prep: ChloraPrep    Monitoring:  Heart rate, continuous pulse ox and cardiac monitor  Block Region:  Lower Extremity  Lower Extremity - Block Type:  Selective FEMORAL nerve block at the Adductor Canal    Laterality:  Left  Procedures: ultrasound guided  Image captured, interpreted and electronically stored.  Block Type:  Single-shot  Needle Length:  100mm  Needle Gauge:  21 G  Needle Localization:  Ultrasound guidance  Ultrasound picture in chart  Injection Assessment:  Negative aspiration for heme, no paresthesia on injection, incremental injection and local visualized surrounding nerve on ultrasound  Evidence of intravascular injection: No     US Guided Selective Femoral Nerve Block at Adductor Canal:   US probe placed at mid-thigh level on externally rotated leg and femur identified.  Probe directed medially until Sartorius Muscle (SM), Femoral Artery (FA) and Saphenous Nerve (SN) identified in Adductor Canal (AC).  Needle inserted anterolateral to probe in an in plane approach into a subsartorial perivascular perineural position.  After negative aspiration LA injected with ease and visualized spreading within the AC.

## 2025-04-29 NOTE — H&P
Surgery Orthopedic History & Physical Note    Date  4/29/2025    Primary Care Physician  KHOI Nieto  Pre-Op Diagnosis Codes:      * Primary osteoarthritis of left knee [M17.12]    HPI  This is a 59 y.o. female who presents for a TKA.    Past Medical History:   Diagnosis Date    Adverse effect of anesthesia 1/28/25    My oxygen levels were very low following my last surgery, I had to be sent home with oxygen.    Arthritis     FEET & knees    Bronchitis 12/15/2014    on antibiotic    Diabetes (HCC)     prediabetic    High cholesterol     Hypothyroidism     Increased BMI     Pain 12/22/2014    right leg, feet, 4/10    Postmenopausal bleeding 07/25/2022    Prediabetes 02/13/2020    Urinary incontinence     Venous thrombosis 08/16/2018    Superficial blood clot right leg, right groin area       Past Surgical History:   Procedure Laterality Date    KNEE ARTHROPLASTY TOTAL Right 1/28/2025    Procedure: RIGHT TOTAL KNEE ARTHROPLASTY;  Surgeon: Kvng Villatoro M.D.;  Location: SURGERY [facility];  Service: Orthopedics    HYSTEROSCOPY WITH MYOSURE  11/18/2022    Procedure: HYSTEROSCOPY WITH POLYPECTOMY;  Surgeon: Amarilys James M.D.;  Location: SURGERY SAME DAY [facility];  Service: Gynecology    KNEE ARTHROSCOPY  12/29/2014    Performed by Chas Titus M.D. at SURGERY SAME DAY [facility] ORS    MENISCECTOMY, KNEE, MEDIAL  12/29/2014    Performed by Chas Titus M.D. at SURGERY SAME DAY [facility] ORS    CHONDROPLASTY  12/29/2014    Performed by Chas Titus M.D. at SURGERY SAME DAY [facility] ORS    THYROIDECTOMY TOTAL  10/20/2010    Performed by SHABBIR BROOKE at SURGERY SAME DAY [facility] ORS    NODE BIOPSY  10/20/2010    Performed by SHABBIR BROOKE at SURGERY SAME DAY [facility] ORS    CHOLECYSTECTOMY  1995    GYN SURGERY      D & C with miscarrage        Current Facility-Administered Medications   Medication Dose Route Frequency Provider Last Rate Last Admin    ceFAZolin (Ancef) injection 2  g  2 g Intravenous Once Kvng Villatoro M.D.        lactated ringers infusion   Intravenous Continuous Kvng Villatoro M.D. 10 mL/hr at 04/29/25 1228 New Bag at 04/29/25 1228       Social History     Socioeconomic History    Marital status:      Spouse name: Shakeel Frausto    Number of children: 3    Years of education: Not on file    Highest education level: Not on file   Occupational History    Not on file   Tobacco Use    Smoking status: Never     Passive exposure: Never    Smokeless tobacco: Never   Vaping Use    Vaping status: Never Used   Substance and Sexual Activity    Alcohol use: No    Drug use: No    Sexual activity: Yes     Partners: Male   Other Topics Concern    Not on file   Social History Narrative    Not on file     Social Drivers of Health     Financial Resource Strain: Not on file   Food Insecurity: Not on file   Transportation Needs: Not on file   Physical Activity: Not on file   Stress: Not on file   Social Connections: Not on file   Intimate Partner Violence: Not on file   Housing Stability: Not on file       Family History   Problem Relation Age of Onset    Cancer Mother         Thyroid ca, lymphoma    Thyroid Mother     Lymphoma Mother     Thyroid cancer Mother     Hypertension Mother     Heart Disease Father     Heart Failure Father     Glaucoma Father     No Known Problems Sister     Heart Failure Brother     Heart Attack Brother     Arthritis Brother         psoriatic    DVT Brother     Psoriasis Brother     Heart Disease Brother     Cancer Maternal Grandmother         Cervical Cancer    No Known Problems Maternal Grandfather     Dementia Paternal Grandmother     Parkinson's Disease Paternal Grandmother     Glaucoma Paternal Grandmother     Cancer Paternal Grandfather         Throat and liver    Cervical Cancer Other        Allergies  Asa [aspirin], Codeine, Latex, and Meloxicam    Review of Systems  Negative    Physical Exam  Regular rate and rhythm  Nonlabored breathing  Abdomen soft  and nontender   Neurovascular intact distally  Skin is in good condition    Vital Signs  Blood Pressure: 129/78   Temperature: 36.3 °C (97.3 °F)   Pulse: 72   Respiration: 12   Pulse Oximetry: 98 %       Labs:                    Radiology:  No orders to display         Assessment/Plan:  Pre-Op Diagnosis Codes:      * Primary osteoarthritis of left knee [M17.12]  Procedure(s):  LEFT TOTAL KNEE ARTHROPLASTY

## 2025-04-30 VITALS
BODY MASS INDEX: 43.66 KG/M2 | RESPIRATION RATE: 17 BRPM | TEMPERATURE: 97.4 F | HEART RATE: 67 BPM | HEIGHT: 64 IN | DIASTOLIC BLOOD PRESSURE: 61 MMHG | WEIGHT: 255.73 LBS | OXYGEN SATURATION: 93 % | SYSTOLIC BLOOD PRESSURE: 118 MMHG

## 2025-04-30 LAB
HCT VFR BLD AUTO: 36.1 % (ref 37–47)
HGB BLD-MCNC: 11.3 G/DL (ref 12–16)

## 2025-04-30 PROCEDURE — A9270 NON-COVERED ITEM OR SERVICE: HCPCS | Performed by: STUDENT IN AN ORGANIZED HEALTH CARE EDUCATION/TRAINING PROGRAM

## 2025-04-30 PROCEDURE — 700102 HCHG RX REV CODE 250 W/ 637 OVERRIDE(OP): Performed by: STUDENT IN AN ORGANIZED HEALTH CARE EDUCATION/TRAINING PROGRAM

## 2025-04-30 PROCEDURE — 700111 HCHG RX REV CODE 636 W/ 250 OVERRIDE (IP): Mod: JZ | Performed by: STUDENT IN AN ORGANIZED HEALTH CARE EDUCATION/TRAINING PROGRAM

## 2025-04-30 PROCEDURE — 36415 COLL VENOUS BLD VENIPUNCTURE: CPT

## 2025-04-30 PROCEDURE — 97535 SELF CARE MNGMENT TRAINING: CPT

## 2025-04-30 PROCEDURE — 94760 N-INVAS EAR/PLS OXIMETRY 1: CPT

## 2025-04-30 PROCEDURE — 99024 POSTOP FOLLOW-UP VISIT: CPT | Performed by: ORTHOPAEDIC SURGERY

## 2025-04-30 PROCEDURE — 97162 PT EVAL MOD COMPLEX 30 MIN: CPT

## 2025-04-30 PROCEDURE — 85014 HEMATOCRIT: CPT

## 2025-04-30 PROCEDURE — 770030 HCHG ROOM/CARE - EXTENDED RECOVERY EACH 15 MIN

## 2025-04-30 PROCEDURE — 97165 OT EVAL LOW COMPLEX 30 MIN: CPT

## 2025-04-30 PROCEDURE — 85018 HEMOGLOBIN: CPT

## 2025-04-30 RX ADMIN — ACETAMINOPHEN 1000 MG: 500 TABLET ORAL at 00:44

## 2025-04-30 RX ADMIN — DOCUSATE SODIUM 100 MG: 100 CAPSULE, LIQUID FILLED ORAL at 05:08

## 2025-04-30 RX ADMIN — KETOROLAC TROMETHAMINE 15 MG: 15 INJECTION, SOLUTION INTRAMUSCULAR; INTRAVENOUS at 00:44

## 2025-04-30 RX ADMIN — OXYCODONE 5 MG: 5 TABLET ORAL at 10:21

## 2025-04-30 RX ADMIN — ASPIRIN 81 MG: 81 TABLET, COATED ORAL at 01:54

## 2025-04-30 RX ADMIN — KETOROLAC TROMETHAMINE 15 MG: 15 INJECTION, SOLUTION INTRAMUSCULAR; INTRAVENOUS at 05:47

## 2025-04-30 RX ADMIN — LEVOTHYROXINE SODIUM 150 MCG: 0.07 TABLET ORAL at 05:08

## 2025-04-30 ASSESSMENT — GAIT ASSESSMENTS
ASSISTIVE DEVICE: FRONT WHEEL WALKER
DEVIATION: ANTALGIC
GAIT LEVEL OF ASSIST: SUPERVISED
DISTANCE (FEET): 15
DISTANCE (FEET): 200

## 2025-04-30 ASSESSMENT — COGNITIVE AND FUNCTIONAL STATUS - GENERAL
CLIMB 3 TO 5 STEPS WITH RAILING: A LITTLE
SUGGESTED CMS G CODE MODIFIER MOBILITY: CK
DAILY ACTIVITIY SCORE: 21
MOVING TO AND FROM BED TO CHAIR: A LITTLE
MOBILITY SCORE: 18
HELP NEEDED FOR BATHING: A LITTLE
MOVING FROM LYING ON BACK TO SITTING ON SIDE OF FLAT BED: A LITTLE
TURNING FROM BACK TO SIDE WHILE IN FLAT BAD: A LITTLE
SUGGESTED CMS G CODE MODIFIER DAILY ACTIVITY: CJ
TOILETING: A LITTLE
WALKING IN HOSPITAL ROOM: A LITTLE
STANDING UP FROM CHAIR USING ARMS: A LITTLE
DRESSING REGULAR LOWER BODY CLOTHING: A LITTLE

## 2025-04-30 ASSESSMENT — PAIN DESCRIPTION - PAIN TYPE
TYPE: SURGICAL PAIN

## 2025-04-30 ASSESSMENT — PATIENT HEALTH QUESTIONNAIRE - PHQ9
2. FEELING DOWN, DEPRESSED, IRRITABLE, OR HOPELESS: NOT AT ALL
SUM OF ALL RESPONSES TO PHQ9 QUESTIONS 1 AND 2: 0
1. LITTLE INTEREST OR PLEASURE IN DOING THINGS: NOT AT ALL

## 2025-04-30 ASSESSMENT — ACTIVITIES OF DAILY LIVING (ADL): TOILETING: INDEPENDENT

## 2025-04-30 NOTE — PROGRESS NOTES
4 Eyes Skin Assessment Completed by Brissa, RN and Krupa RN.    Head WDL  Ears WDL  Nose WDL  Mouth WDL  Neck WDL  Breast/Chest WDL  Shoulder Blades WDL  Spine WDL  (R) Arm/Elbow/Hand WDL  (L) Arm/Elbow/Hand WDL  Abdomen WDL  Groin WDL  Scrotum/Coccyx/Buttocks WDL  (R) Leg Scar to Knee, Swelling  (L) Leg Incision with honeycomb dressing, CDI; Swelling  (R) Heel/Foot/Toe Dry and Flaky  (L) Heel/Foot/Toe Dry and Flaky          Devices In Places Blood Pressure Cuff, Pulse Ox, SCD's, and Nasal Cannula      Interventions In Place NC W/Ear Foams, Pillows, and Pressure Redistribution Mattress    Possible Skin Injury No    Pictures Uploaded Into Epic N/A  Wound Consult Placed N/A  RN Wound Prevention Protocol Ordered No

## 2025-04-30 NOTE — PROGRESS NOTES
BSSR received from night RN. Pt resting comfortably in bed not in any distress on RA at 93%. AAOx4. Reported pain, cold pack and resting for relief. Denies nausea. L knee dressing in place, CDI. CMS noted.  Discussed POC. Fall risk precautions in place, locked bed in lowest position, bed alarm on and call light within reach. All needs met at this time. Hourly rounding in place.

## 2025-04-30 NOTE — CARE PLAN
The patient is Stable - Low risk of patient condition declining or worsening    Shift Goals  Clinical Goals: Patient pain will be managed at a tolerable level of 3/10 or less throughout night, Patient will remain free from fall or injury  Patient Goals: Rest comfortably, pain management  Family Goals: n/a    Progress made toward(s) clinical / shift goals:  Patient's pain maintained at a tolerable level of 3/10 or less throughout shift with medication per MAR and rest. Pt did not sustain a fall or injury during shift. Able to rest comfortably throughout shift. Patient ambulated more than 50 feet in hallway during shift. Patient able to void clear, yellow urine. Patient used IS effectively.     Patient is not progressing towards the following goals:

## 2025-04-30 NOTE — PROGRESS NOTES
"Subjective:    No acute events.  Pain reasonably controlled.  Mobilizing well.    Objective:  /66   Pulse 72   Temp 36.2 °C (97.1 °F) (Temporal)   Resp 16   Ht 1.626 m (5' 4\")   Wt 116 kg (255 lb 11.7 oz)   SpO2 93%     Recent Labs     04/30/25  0325   HEMOGLOBIN 11.3*   HEMATOCRIT 36.1*             Intake/Output Summary (Last 24 hours) at 4/30/2025 0639  Last data filed at 4/30/2025 0500  Gross per 24 hour   Intake 1770 ml   Output 750 ml   Net 1020 ml       Comfortable, no distress  Neurologically and vascularly intact with palpable pedal pulses bilaterally.  Dressing C/D/I      Assessment:  Doing well s/p total knee arthroplasty.    Plan:    Diet as tolerated  WBAT  PT/OT  Reviewed knee ROM including terminal flexion and extension exercises  DVT ppx - ASA BID + Sequential Compression Devices  Plan to discharge home  Follow-up in approximately 2 weeks post-op.  [phone]    "

## 2025-04-30 NOTE — PROGRESS NOTES
Discharged per MD order in a stable condition. Discussed discharge summary, medication list and follow up with pt and , verbalized understanding. Answered all questions. Removed PIV. All belongings accounted for. Transported via  with an escort.

## 2025-04-30 NOTE — THERAPY
Occupational Therapy   Initial Evaluation     Patient Name: Antonella Frausto  Age:  59 y.o., Sex:  female  Medical Record #: [id number]  Today's Date: 4/30/2025     Precautions  Precautions: Weight Bearing As Tolerated Left Lower Extremity    Assessment  Patient is 59 y.o. female with a diagnosis of Left TKA.  Additional factors influencing patient status / progress: WBAT LLE.  Pt and  reside in a [facility] in [place], [place].   is available to assist as needed.  PLOF Mod I to Indep for ADL's, transfers and ambulation w/out a device.  Therapist reviewed environmental/safety awareness, fall precautions, Joint protection, NORM dressing, AE/DME, ADL's and transfers    Plan    Occupational Therapy Initial Treatment Plan   Duration: (P) Evaluation only    DC Equipment Recommendations: (P) None  Discharge Recommendations: (P) Anticipate that the patient will have no further occupational therapy needs after discharge from the hospital     Subjective    Pt was alert and cooperative w/ tx.     Objective       04/30/25 0810    Services   Is patient using  services for this encounter? No   Initial Contact Note    Initial Contact Note Order Received and Verified, Evaluation Only - Patient Does Not Require Further Acute Occupational Therapy at this Time.  However, May Benefit from Post Acute Therapy for Higher Level Functional Deficits.   Prior Living Situation   Housing / Facility 1 Story House   Steps Into Home 2   Steps In Home 0   Bathroom Set up Bathtub / Shower Combination;Shower Curtain   Equipment Owned Front-Wheel Walker;Quad Cane;Tub / Shower Seat;Raised Toilet Seat Without Arms;Reacher   Lives with - Patient's Self Care Capacity Spouse   Comments Pt and  reside in a SL in [place], [place].   is available to assist as needed.  PLOF Mod I to Indep for ADL's, transfers and ambulation w/out a device.   Prior Level of ADL Function   Self Feeding Independent   Grooming / Hygiene  Independent   Bathing Independent   Dressing Independent   Toileting Independent   Prior Level of IADL Function   Medication Management Independent   Laundry Independent   Kitchen Mobility Independent   Finances Independent   Home Management Independent   Shopping Independent   Prior Level Of Mobility Independent Without Device in Community;Independent With Steps in Community;Independent Without Device in Home;Independent With Steps in Home   Driving / Transportation Driving Independent   Precautions   Precautions Weight Bearing As Tolerated Left Lower Extremity   Vitals   Pulse Oximetry 92 %   O2 (LPM) 0   O2 Delivery Device Room air w/o2 available   Pain   Intervention Cold Pack;Rest;Repositioned   Pain 0 - 10 Group   Location Knee   Location Orientation Left   Comfort Goal Comfort with Movement;Perform Activity   Non Verbal Descriptors   Non Verbal Scale  Calm;Unlabored Breathing   Cognition    Cognition / Consciousness WDL   Level of Consciousness Alert   Passive ROM Upper Body   Passive ROM Upper Body WDL   Active ROM Upper Body   Active ROM Upper Body  WDL   Dominant Hand Right   Strength Upper Body   Upper Body Strength  WDL   Upper Body Muscle Tone   Upper Body Muscle Tone  WDL   Coordination Upper Body   Coordination WDL   Balance Assessment   Sitting Balance (Static) Good   Sitting Balance (Dynamic) Good   Standing Balance (Static) Good   Standing Balance (Dynamic) Fair +   Weight Shift Sitting Good   Weight Shift Standing Good   Comments OOB FWW   Bed Mobility    Supine to Sit Modified Independent   Sit to Supine Modified Independent   ADL Assessment   Grooming Modified Independent;Standing   Upper Body Dressing Independent   Lower Body Dressing Minimal Assist   Toileting Supervision   How much help from another person does the patient currently need...   Putting on and taking off regular lower body clothing? 3   Bathing (including washing, rinsing, and drying)? 3   Toileting, which includes using a  toilet, bedpan, or urinal? 3   Putting on and taking off regular upper body clothing? 4   Taking care of personal grooming such as brushing teeth? 4   Eating meals? 4   6 Clicks Daily Activity Score 21   Functional Mobility   Sit to Stand Supervised   Bed, Chair, Wheelchair Transfer Supervised   Toilet Transfers Supervised   Transfer Method Stand Step   Mobility Sup FWW   Distance (Feet) 15   # of Times Distance was Traveled 2   Edema / Skin Assessment   Comments Surgical site/dressing clean, dry and intact.  Review of wear/care of NORM dressing.   Education Group   Education Provided Joint Protection;Home Safety;Transfers;Role of Occupational Therapist;Activities of Daily Living;Use of Call Light;Adaptive Equipment   Role of Occupational Therapist Patient Response Patient;Acceptance;Explanation;Verbal Demonstration   Joint Protection Patient Response Patient;Acceptance;Explanation;Demonstration;Teach Back;Verbal Demonstration;Action Demonstration   Home Safety Patient Response Patient;Acceptance;Explanation;Demonstration;Verbal Demonstration   Transfers Patient Response Patient;Acceptance;Explanation;Demonstration;Verbal Demonstration;Action Demonstration;Teach Back   ADL Patient Response Patient;Acceptance;Explanation;Demonstration;Teach Back;Verbal Demonstration;Action Demonstration   Adaptive Equipment Patient Response Patient;Acceptance;Explanation;Demonstration;Teach Back;Verbal Demonstration;Action Demonstration   Use of Call Light Patient Response Patient;Acceptance;Explanation;Demonstration;Verbal Demonstration   Occupational Therapy Initial Treatment Plan    Duration Evaluation only   Anticipated Discharge Equipment and Recommendations   DC Equipment Recommendations None   Discharge Recommendations Anticipate that the patient will have no further occupational therapy needs after discharge from the hospital

## 2025-04-30 NOTE — CARE PLAN
The patient is Stable - Low risk of patient condition declining or worsening    Shift Goals  Clinical Goals: Pt's pain will be controlled, PT/OT and free from falls during this shift  Patient Goals: Able to maintain pain controlled, PT/OT and dc home  Family Goals: n/a    Progress made toward(s) clinical / shift goals:  Pt reported pain controlled. PT/OT eval done. Ambulated. Tolerated diet, denies nausea. Pt did not sustain any fall during this shift.    Patient is not progressing towards the following goals:

## 2025-04-30 NOTE — PROGRESS NOTES
Patient arrived to floor at 1904. Assumed patient care. Patient is A&O4, on 0.5L at 96%, no SOB noted. Ambulated to bed with steady gait using FWW. No signs of distress or discomfort. Plan of care for the night discussed with patient. Patient verbalized understanding. Patient resting in bed comfortably. Safety precautions in place. All patient belongings and call light within reach. All needs addressed at this time. Patient will call with any needs.

## 2025-04-30 NOTE — THERAPY
Physical Therapy   Initial Evaluation     Patient Name: Antonella Frausto  Age:  59 y.o., Sex:  female  Medical Record #: [id number]  Today's Date: 4/30/2025     Precautions  Precautions: (P) Weight Bearing As Tolerated Left Lower Extremity    Assessment  Patient is a 57 y/o female who was seen s/p left TKA with WBAT orders for left lower extremity.   Pt was agreeable to therapy evaluation and presented with safe upright functional mobility with AD use after therapy session.   Pt was provided with education on elevation, icing, positioning, and supine/seated therapeutic exercises.   Pt was able to return demo safe gait mechanics with use of AD on flat level surfaces with appropriate heel to toe gait mechanics  . Pt was able to return demo safe mechanics in order to navigate stairs with use of FWW. Pt was able to demonstrate safe use of AD during transfers/transitions and general locomotion with no lindsay LOB.   Pt has no acute skilled PT needs at this time, anticipate pt to d/c home once medically clear with recs for FWW use and OP therapy services.     Plan         DC Equipment Recommendations: (P) None  Discharge Recommendations: (P) Recommend outpatient physical therapy services to address higher level deficits         Initial Contact Note    Initial Contact Note Order Received and Verified, Physical Therapy Evaluation in Progress with Full Report to Follow.   Precautions   Precautions Weight Bearing As Tolerated Left Lower Extremity   Pain 0 - 10 Group   Location Knee   Location Orientation Left   Pain Rating Scale (NPRS) 6   Therapist Pain Assessment Post Activity;6   Prior Living Situation   Prior Services Home With Outpatient Therapy   Housing / Facility 1 Story House   Steps Into Home 1   Equipment Owned Front-Wheel Walker   Lives with - Patient's Self Care Capacity Spouse   Prior Level of Functional Mobility   Bed Mobility Independent   Transfer Status Independent   Ambulation Independent   Ambulation Distance  community   Comments pt attends OP PT for prior R TKA   Cognition    Level of Consciousness Alert   Sensation Upper Body   Upper Extremity Sensation  WDL   Upper Body Muscle Tone   Upper Body Muscle Tone  WDL   Active ROM Lower Body    Comments left knee 0-110   Strength Lower Body   Comments WFL limited by post op pain but WFL for ambulation   Sensation Lower Body   Lower Extremity Sensation   WDL   Balance Assessment   Sitting Balance (Static) Good   Sitting Balance (Dynamic) Good   Standing Balance (Static) Good   Standing Balance (Dynamic) Good   Weight Shift Sitting Good   Weight Shift Standing Good   Comments w/FWW   Bed Mobility    Supine to Sit Independent   Gait Analysis   Gait Level Of Assist Supervised   Assistive Device Front Wheel Walker   Distance (Feet) 200   # of Times Distance was Traveled 1   Deviation Antalgic   Weight Bearing Status WBAT   Functional Mobility   Sit to Stand Supervised   Bed, Chair, Wheelchair Transfer Supervised   6 Clicks Assessment - How much HELP from from another person do you currently need... (If the patient hasn't done an activity recently, how much help from another person do you think he/she would need if he/she tried?)   Turning from your back to your side while in a flat bed without using bedrails? 3   Moving from lying on your back to sitting on the side of a flat bed without using bedrails? 3   Moving to and from a bed to a chair (including a wheelchair)? 3   Standing up from a chair using your arms (e.g., wheelchair, or bedside chair)? 3   Walking in hospital room? 3   Climbing 3-5 steps with a railing? 3   6 clicks Mobility Score 18   Anticipated Discharge Equipment and Recommendations   DC Equipment Recommendations None   Discharge Recommendations Recommend outpatient physical therapy services to address higher level deficits   Interdisciplinary Plan of Care Collaboration   IDT Collaboration with  Nursing;Family / Caregiver   Patient Position at End of Therapy In  Bed   Collaboration Comments RN updated. Pt cleared for home.   Session Information   Date / Session Number  4/30-Eval only.

## 2025-05-14 ENCOUNTER — PHYSICAL THERAPY (OUTPATIENT)
Dept: PHYSICAL THERAPY | Facility: MEDICAL CENTER | Age: 60
End: 2025-05-14
Attending: NURSE PRACTITIONER
Payer: COMMERCIAL

## 2025-05-14 DIAGNOSIS — M25.662 KNEE STIFFNESS, LEFT: ICD-10-CM

## 2025-05-14 DIAGNOSIS — Z96.651 STATUS POST RIGHT KNEE REPLACEMENT: ICD-10-CM

## 2025-05-14 DIAGNOSIS — Z96.652 S/P TKR (TOTAL KNEE REPLACEMENT), LEFT: ICD-10-CM

## 2025-05-14 DIAGNOSIS — Z96.652 S/P TOTAL KNEE REPLACEMENT, LEFT: Primary | ICD-10-CM

## 2025-05-14 PROCEDURE — 97140 MANUAL THERAPY 1/> REGIONS: CPT

## 2025-05-14 SDOH — ECONOMIC STABILITY: GENERAL: QUALITY OF LIFE: GOOD

## 2025-05-14 ASSESSMENT — ENCOUNTER SYMPTOMS
PAIN TIMING: ALL DAY
PAIN SCALE AT LOWEST: 3
ALLEVIATING FACTORS: NOTHING
EXACERBATED BY: STANDING
PAIN SCALE: 3
PAIN LOCATION: RIGHT KNEE STIFFNESS
EXACERBATED BY: WALKING
PAIN SCALE AT HIGHEST: 6

## 2025-05-14 NOTE — OP THERAPY PROGRESS SUMMARY
Outpatient Physical Therapy  LYMPHEDEMA THERAPY PROGRESS SUMMARY NOTE    [facility] Out[facility]  [address]  [place] [address]  Phone:  [phone]  Fax:  [phone]    Date of Visit: 2025    Patient: Antonella Frausto  YOB: 1965  MRN: [id number]     Referring Provider: KHOI Nieto  [address] [address]   Referring Diagnosis Localized edema [R60.0]     Visit #: Visit count could not be calculated. Make sure you are using a visit which is associated with an episode.    Progress Report Period: 25-25    Time Calculation    Start time: 1445  Stop time: 1517 Time Calculation (min): 32 minutes         Chief Complaint: Lymphedema Aquired    Visit Diagnoses     ICD-10-CM   1. S/P total knee replacement, left  Z96.652   2. Knee stiffness, left  M25.662   3. Status post right knee replacement  Z96.651   4. S/P TKR (total knee replacement), left  Z96.652       Subjective:   History of Present Illness:     Mechanism of injury:  Patient has been scheduled for her L TKA on 25. She has been able to fit back into her ready wraps and is also going to the pool 2x/week   Quality of life:  Good  Headaches:  no headaches  Sleep disturbance:  Not disrupted  Pain:     Current pain rating:  3    At best pain rating:  3    At worst pain ratin    Location:  Right knee stiffness    Pain timing:  All day    Relieving factors:  Nothing    Aggravating factors:  Walking and standing    Progression:  Unchanged  Social Support:     Lives in:  One-story house    Lives with:  Spouse  Treatments:     None    Patient Goals:     Patient goals for therapy:  Decreased edema and decreased pain      Lymphedema Objective    Visit type  Lipedema and Lipolymphedema    Postural Observation  Seated posture is fair  Standing posture is fair  Correction of posture has no consistent effect      Skin Appearance    moderate edema,  pitting edema,  .    Sensation      Left Lower Extremity    Light tough is Impaired    Right Lower Extremity    Light touch is Impaired    Sensation Comments  Hypersensitivity  Left Lower Extremity Circumferential Measurements  Waist: 107.1 cm  Hip: 139.4 cm  Scrotum: cm  Ground/Upper Thigh: 74.9 cm  Mid Thigh: 63.9 cm  Knee: 51.5 cm  Upper Calf: 60.2 cm  Mid Calf: 54.5 cm  Ankle: 41.6 cm  Heel to Foot: 24.6 cm  Total: 617.7 cm    Right Lower Extremity Circumferential Measurements  Waist: 107.1 cm  Hip: 139.4 cm  Scrotum: cm  Ground/Upper Thigh: 69.9 cm  Mid Thigh: 57.9 cm  Knee: 47.1 cm  Upper Calf: 56.2 cm  Mid Calf: 48.1 cm  Ankle: 38.2 cm  Heel to Foot: 24.1 cm  Total: 588 cm    Lymphedema Stage  Stage 2 Lymphedema    Other Notes  L ankle - 31.5 cm  L Calf 55.1 cm  Left knee 50.3 cm  Length -49.4 cm  Left lower thigh - 56.4 cm  Left upper thigh - 57.8 cm  R ankle - 30.7 cm  R Calf 53.2 cm  R  knee 51.9 cm  Length - 34.1 cm  Right lower thigh - 57.6 cm  Right upper thigh - 62.1 cm      Exercises/Treatment    Time-based treatments/modalities:    Physical Therapy Timed Treatment Charges  Manual therapy minutes (CPT 28214): 32 minutes      Assessment and Plan:   Assessment details:  Thank you for referring Antonella to physical therapy. She has attended 4 sessions with 0 cancellations/no shows since date of last progress report on 2/12/25. Treatment has consisted of patient education with regards to the lymphatic system, manual lymphatic drainage, compression bandaging, manual therapies to increase soft tissue and joint restriction, compression garments, therapeutic exercise to improve musculoskeletal strength and mobility, and lymphedema risk reduction strategies. Since last PN she has undergone a L TKA on 4/29/25. She is currently in orthopedic based physical therapy to address her ROM and strength limitations. She has sharply increased pain into her LLE that is different than she experienced with her RLE. She is  taking medication to address and does feel that this is helpful, but is concerned with her degree of pain. She is not yet wearing any flat knit compression on either leg or using her pneumatic compression pump as a result of trying to manage her pain. I ask that she try to resume her compression on the RLE, and speak with her surgeon regarding her use of pneumatic pump. Swelling on the LLE is up 8-10 cm at each point and 1-3cm on the RLE at each point. I am concerned both for her mobility as well as her increased risk for infection. Patient would continue to benefit from skilled physical therapy intervention to address the above mentioned impairments and prevent further functional decline. If you have any questions involving this plan of care please feel free to contact our clinic at [phone]. Thank you for the opportunity to work with Antonella.     Barriers to therapy:  Comorbidities  Prognosis: fair      Goals:   Short Term Goals:  1 - Patient will be able to tolerate wearing reduction kit to bilateral lower extremities for 12/24 hours (GOAL MET)  2 - Patient will have no less than a 2 cm reduction in limb volume bilaterally (GOAL MET)  3 - Patient will be independent in HEP targeted for lymphatic fluid reduction (GOAL MET)  NEW GOALS 2/19/25 and 5/14/25  1 - Patient will resume wearing of her in-elastic compression garments for 12/24 hours days per week to reduce lymphatic load  2 - Patient will resume use of her pneumatic compression pump for 5/7 days per week to reduce lymphatic load and promote lymphatic mobilization   3 - Patient will have no less than a 3 cm reduction in limb volume to facilitate lymphatic load clearance and mobility.     Short term goal time span:  4-6 weeks    Long Term Goals:  NEW GOALS  1 - Patient will be independent with maintenance phase management of lymphedema including: compression garments, skin care education, risk reduction strategies, manual lymphatic drainage, and  therapeutic exercise (GOAL IN PROGRESS)  2 - Patient will have no less than a 5 cm reduction in limb volume bilaterally (GOAL IN PROGRESS)  3 - Patient will be able to ambulate 200 feet with the use of a SPC while wearing in elastic compression garmenst (GOAL IN PROGRESS)    Long term goal time span:  2-4 months    Plan:  Therapy options:  Physical therapy treatment to continue  Planned therapy interventions:  Addressing equipment needs, compression bandaging, compression stocking, decongestive exercises, home exercise program, intermittent compression, manual lymph drainage, Velcro wraps, referral for compression garment & instructions for don/doffing, patient education and range of motion exercises  Planned education:  Functional anatomy and physiology of the lymphatic system, pathophysiology of lymphedema, lymphedema exercise, proper skin care/nutrition, lymphedema precautions, compression bandaging, self massage, long term self-management of lymphedema, home pump use and activity guidelines  Frequency:  1x week  Duration in weeks:  12  Duration in visits:  12  Discussed with:  Patient      Functional Assessment Used        Referring provider co-signature:  I have reviewed this plan of care and my co-signature certifies the need for services.    Certification Period: 05/14/2025 to 07/13/25    Physician Signature: ________________________________ Date: ______________

## 2025-05-15 NOTE — OP THERAPY DAILY TREATMENT
Outpatient Physical Therapy  LYMPHEDEMA THERAPY DAILY TREATMENT     [facility] Outpatient Physical Therapy  [address]  [place] [address]  Phone:  [phone]  Fax:  [phone]    Date: 05/14/2025    Patient: Antonella Frausto  YOB: 1965  MRN: [id number]     Time Calculation    Start time: 1445  Stop time: 1517 Time Calculation (min): 32 minutes         Chief Complaint: Lymphedema Aquired    Visit #: 19    Subjective:   History of Present Illness:     Mechanism of injury:  SEE PN       Lymphedema Objective      Exercises/Treatment  Time-based treatments/modalities:    Physical Therapy Timed Treatment Charges  Manual therapy minutes (CPT 00624): 32 minutes      Assessment and Plan:   Assessment details:  SEE PN

## 2025-05-21 ENCOUNTER — APPOINTMENT (OUTPATIENT)
Dept: PHYSICAL THERAPY | Facility: MEDICAL CENTER | Age: 60
End: 2025-05-21
Attending: NURSE PRACTITIONER
Payer: COMMERCIAL

## 2025-05-28 ENCOUNTER — APPOINTMENT (OUTPATIENT)
Dept: PHYSICAL THERAPY | Facility: MEDICAL CENTER | Age: 60
End: 2025-05-28
Attending: NURSE PRACTITIONER
Payer: COMMERCIAL

## 2025-06-04 ENCOUNTER — PHYSICAL THERAPY (OUTPATIENT)
Dept: PHYSICAL THERAPY | Facility: MEDICAL CENTER | Age: 60
End: 2025-06-04
Attending: NURSE PRACTITIONER
Payer: COMMERCIAL

## 2025-06-04 DIAGNOSIS — Z96.651 STATUS POST RIGHT KNEE REPLACEMENT: Primary | ICD-10-CM

## 2025-06-04 DIAGNOSIS — Z96.652 S/P TKR (TOTAL KNEE REPLACEMENT), LEFT: ICD-10-CM

## 2025-06-04 DIAGNOSIS — R60.0 BILATERAL LOWER EXTREMITY EDEMA: ICD-10-CM

## 2025-06-04 PROCEDURE — 97140 MANUAL THERAPY 1/> REGIONS: CPT

## 2025-06-04 SDOH — ECONOMIC STABILITY: GENERAL: QUALITY OF LIFE: GOOD

## 2025-06-04 ASSESSMENT — ENCOUNTER SYMPTOMS
ALLEVIATING FACTORS: NOTHING
PAIN SCALE AT LOWEST: 3
EXACERBATED BY: STANDING
PAIN SCALE: 3
PAIN SCALE AT HIGHEST: 6
PAIN LOCATION: RIGHT KNEE STIFFNESS
PAIN TIMING: ALL DAY
EXACERBATED BY: WALKING

## 2025-06-04 NOTE — OP THERAPY DAILY TREATMENT
Outpatient Physical Therapy  LYMPHEDEMA THERAPY DAILY TREATMENT     [facility] Outpatient Physical Therapy  [address]  [place] [address]  Phone:  [phone]  Fax:  [phone]    Date: 2025    Patient: Antonella Frausto  YOB: 1965  MRN: [id number]     Time Calculation    Start time: 1400  Stop time: 1439 Time Calculation (min): 39 minutes         Chief Complaint: Difficulty Walking and Lymphedema Aquired    Visit #: 20    Subjective:   History of Present Illness:     Mechanism of injury:  Patient is still having considerable pain, but was able to don her ReadyWraps this week. She has been able to transition from her walker to a SPC and is pleased with this progress  Quality of life:  Good  Headaches:  no headaches  Sleep disturbance:  Not disrupted  Pain:     Current pain rating:  3    At best pain rating:  3    At worst pain ratin    Location:  Right knee stiffness    Pain timing:  All day    Relieving factors:  Nothing    Aggravating factors:  Walking and standing    Progression:  Unchanged  Social Support:     Lives in:  One-story house    Lives with:  Spouse  Treatments:     None    Patient Goals:     Patient goals for therapy:  Decreased edema and decreased pain      Lymphedema Objective    Visit type  Lipedema and Lipolymphedema    Postural Observation  Seated posture is fair  Standing posture is fair  Correction of posture has no consistent effect      Skin Appearance    moderate edema, pitting edema,  .    Sensation      Left Lower Extremity    Light tough is Impaired    Right Lower Extremity    Light touch is Impaired    Sensation Comments  Hypersensitivity  Left Lower Extremity Circumferential Measurements  Waist: 107.1 cm  Hip: 139.4 cm  Scrotum: cm  Ground/Upper Thigh: 74.9 cm  Mid Thigh: 57.1 cm  Knee: 49.7 cm  Upper Calf: 58.5 cm  Mid Calf: 51.7 cm  Ankle: 41.4 cm  Heel to Foot: 24.6 cm  Total: 604.4 cm    Right Lower Extremity  Circumferential Measurements  Waist: 107.1 cm  Hip: 139.4 cm  Scrotum: cm  Ground/Upper Thigh: 69.9 cm  Mid Thigh: 57.9 cm  Knee: 47.1 cm  Upper Calf: 56.2 cm  Mid Calf: 48.1 cm  Ankle: 38.2 cm  Heel to Foot: 24.1 cm  Total: 588 cm    Lymphedema Stage  Stage 2 Lymphedema    Other Notes  L ankle - 31.5 cm  L Calf 55.1 cm  Left knee 50.3 cm  Length -49.4 cm  Left lower thigh - 56.4 cm  Left upper thigh - 57.8 cm  R ankle - 30.7 cm  R Calf 53.2 cm  R  knee 51.9 cm  Length - 34.1 cm  Right lower thigh - 57.6 cm  Right upper thigh - 62.1 cm          Therapeutic Treatments and Modalities:     1. Manual Therapy (CPT 20870)    Therapeutic Treatment and Modalities Summary: Short neck sequence, abdominal sequence, establishment of inguinal to axillary anastamoses, decongestion of the left lower extremity from proximal to distal with an emphasis on medial to lateral drainage     KT Tape starburst pattern to the left knee    The purpose of Manual Lymph Drainage (MLD) is to reduce lymph volume in the affected limb by increasing intake of lymphatic load into the lymphatic system, increasing the volume of transported lymph fluid, moving lymph fluid in superficial lymph vessels to collateral lymph collectors, anastomoses, or tissue channels, and increasing venous return. The goal of MLD is to re-route the lymph flow around blocked areas into more centrally located healthy lymph vessels, which drain into the venous system.       Time-based treatments/modalities:    Physical Therapy Timed Treatment Charges  Manual therapy minutes (CPT 27705): 39 minutes      Assessment and Plan:   Assessment details:  Improved tolerance to tx as well as cicrumferential measurements. I have asked that she resume using her pump as this will not only assist with fluid reduction, but pain management. Trial of KT tape. Will assess effectiveness next session   Barriers to therapy:  Comorbidities  Prognosis: fair      Goals:   Short Term Goals:  1 - Patient  will be able to tolerate wearing reduction kit to bilateral lower extremities for 12/24 hours (GOAL MET)  2 - Patient will have no less than a 2 cm reduction in limb volume bilaterally (GOAL MET)  3 - Patient will be independent in HEP targeted for lymphatic fluid reduction (GOAL MET)  NEW GOALS 2/19/25 and 5/14/25  1 - Patient will resume wearing of her in-elastic compression garments for 12/24 hours days per week to reduce lymphatic load  2 - Patient will resume use of her pneumatic compression pump for 5/7 days per week to reduce lymphatic load and promote lymphatic mobilization   3 - Patient will have no less than a 3 cm reduction in limb volume to facilitate lymphatic load clearance and mobility.     Short term goal time span:  4-6 weeks    Long Term Goals:  NEW GOALS  1 - Patient will be independent with maintenance phase management of lymphedema including: compression garments, skin care education, risk reduction strategies, manual lymphatic drainage, and therapeutic exercise (GOAL IN PROGRESS)  2 - Patient will have no less than a 5 cm reduction in limb volume bilaterally (GOAL IN PROGRESS)  3 - Patient will be able to ambulate 200 feet with the use of a SPC while wearing in elastic compression garmenst (GOAL IN PROGRESS)    Long term goal time span:  2-4 months    Plan:  Therapy options:  Physical therapy treatment to continue  Planned therapy interventions:  Addressing equipment needs, compression bandaging, compression stocking, decongestive exercises, home exercise program, intermittent compression, manual lymph drainage, Velcro wraps, referral for compression garment & instructions for don/doffing, patient education and range of motion exercises  Planned education:  Functional anatomy and physiology of the lymphatic system, pathophysiology of lymphedema, lymphedema exercise, proper skin care/nutrition, lymphedema precautions, compression bandaging, self massage, long term self-management of lymphedema,  home pump use and activity guidelines  Frequency:  1x week  Duration in weeks:  12  Duration in visits:  12  Discussed with:  Patient

## 2025-06-11 ENCOUNTER — PHYSICAL THERAPY (OUTPATIENT)
Dept: PHYSICAL THERAPY | Facility: MEDICAL CENTER | Age: 60
End: 2025-06-11
Attending: NURSE PRACTITIONER
Payer: COMMERCIAL

## 2025-06-11 DIAGNOSIS — R60.0 BILATERAL LOWER EXTREMITY EDEMA: ICD-10-CM

## 2025-06-11 DIAGNOSIS — M25.662 KNEE STIFFNESS, LEFT: ICD-10-CM

## 2025-06-11 DIAGNOSIS — Z96.652 S/P TKR (TOTAL KNEE REPLACEMENT), LEFT: Primary | ICD-10-CM

## 2025-06-11 PROCEDURE — 97140 MANUAL THERAPY 1/> REGIONS: CPT

## 2025-06-11 SDOH — ECONOMIC STABILITY: GENERAL: QUALITY OF LIFE: GOOD

## 2025-06-11 ASSESSMENT — ENCOUNTER SYMPTOMS
PAIN SCALE AT HIGHEST: 6
PAIN LOCATION: RIGHT KNEE STIFFNESS
ALLEVIATING FACTORS: NOTHING
EXACERBATED BY: WALKING
EXACERBATED BY: STANDING
PAIN TIMING: ALL DAY
PAIN SCALE AT LOWEST: 3
PAIN SCALE: 3

## 2025-06-11 NOTE — OP THERAPY DAILY TREATMENT
Outpatient Physical Therapy  LYMPHEDEMA THERAPY DAILY TREATMENT     [facility] Outpatient Physical Therapy  [address]  [place] [address]  Phone:  [phone]  Fax:  [phone]    Date: 2025    Patient: Antonella Frausto  YOB: 1965  MRN: [id number]     Time Calculation                   Chief Complaint: No chief complaint on file.    Visit #: 21    Subjective:   History of Present Illness:     Mechanism of injury:  Patient has been using her pump and was able to attend all of her quilt show. She will complete PT for her L TKA this week  Quality of life:  Good  Headaches:  no headaches  Sleep disturbance:  Not disrupted  Pain:     Current pain rating:  3    At best pain rating:  3    At worst pain ratin    Location:  Right knee stiffness    Pain timing:  All day    Relieving factors:  Nothing    Aggravating factors:  Walking and standing    Progression:  Unchanged  Social Support:     Lives in:  One-story house    Lives with:  Spouse  Treatments:     None    Patient Goals:     Patient goals for therapy:  Decreased edema and decreased pain      Lymphedema Objective    Visit type  Lipedema and Lipolymphedema    Postural Observation  Seated posture is fair  Standing posture is fair  Correction of posture has no consistent effect      Skin Appearance    moderate edema, pitting edema,  .    Sensation      Left Lower Extremity    Light tough is Impaired    Right Lower Extremity    Light touch is Impaired    Sensation Comments  Hypersensitivity  Left Lower Extremity Circumferential Measurements  Waist: 107.1 cm  Hip: 139.4 cm  Scrotum: cm  Ground/Upper Thigh: 74.9 cm  Mid Thigh: 57.1 cm  Knee: 49.7 cm  Upper Calf: 57.9 cm  Mid Calf: 50.1 cm  Ankle: 38.1 cm  Heel to Foot: 24.6 cm  Total: 598.9 cm    Right Lower Extremity Circumferential Measurements  Waist: 107.1 cm  Hip: 139.4 cm  Scrotum: cm  Ground/Upper Thigh: 69.9 cm  Mid Thigh: 57.9 cm  Knee: 47.1  cm  Upper Calf: 56.2 cm  Mid Calf: 48.1 cm  Ankle: 38.2 cm  Heel to Foot: 24.1 cm  Total: 588 cm    Lymphedema Stage  Stage 2 Lymphedema    Other Notes  L ankle - 31.5 cm  L Calf 55.1 cm  Left knee 50.3 cm  Length -49.4 cm  Left lower thigh - 56.4 cm  Left upper thigh - 57.8 cm  R ankle - 30.7 cm  R Calf 53.2 cm  R  knee 51.9 cm  Length - 34.1 cm  Right lower thigh - 57.6 cm  Right upper thigh - 62.1 cm          Therapeutic Treatments and Modalities:     1. Manual Therapy (CPT 04161)    Therapeutic Treatment and Modalities Summary: Short neck sequence, abdominal sequence, establishment of inguinal to axillary anastamoses, decongestion of the left lower extremity from proximal to distal with an emphasis on medial to lateral drainage     KT Tape starburst pattern to the left knee    The purpose of Manual Lymph Drainage (MLD) is to reduce lymph volume in the affected limb by increasing intake of lymphatic load into the lymphatic system, increasing the volume of transported lymph fluid, moving lymph fluid in superficial lymph vessels to collateral lymph collectors, anastomoses, or tissue channels, and increasing venous return. The goal of MLD is to re-route the lymph flow around blocked areas into more centrally located healthy lymph vessels, which drain into the venous system.       Time-based treatments/modalities:           Assessment and Plan:   Assessment details:  Additional 2cm reduction observed following resuming use of her pump. She will also resume aquatic there-ex once finished with PT for her L TKA. Will follow up next week to see how this transition back to water is helping, and will also consider placing order for new velcro wraps  Barriers to therapy:  Comorbidities  Prognosis: fair      Goals:   Short Term Goals:  1 - Patient will be able to tolerate wearing reduction kit to bilateral lower extremities for 12/24 hours (GOAL MET)  2 - Patient will have no less than a 2 cm reduction in limb volume  bilaterally (GOAL MET)  3 - Patient will be independent in HEP targeted for lymphatic fluid reduction (GOAL MET)  NEW GOALS 2/19/25 and 5/14/25  1 - Patient will resume wearing of her in-elastic compression garments for 12/24 hours days per week to reduce lymphatic load  2 - Patient will resume use of her pneumatic compression pump for 5/7 days per week to reduce lymphatic load and promote lymphatic mobilization   3 - Patient will have no less than a 3 cm reduction in limb volume to facilitate lymphatic load clearance and mobility.     Short term goal time span:  4-6 weeks    Long Term Goals:  NEW GOALS  1 - Patient will be independent with maintenance phase management of lymphedema including: compression garments, skin care education, risk reduction strategies, manual lymphatic drainage, and therapeutic exercise (GOAL IN PROGRESS)  2 - Patient will have no less than a 5 cm reduction in limb volume bilaterally (GOAL IN PROGRESS)  3 - Patient will be able to ambulate 200 feet with the use of a SPC while wearing in elastic compression garmenst (GOAL IN PROGRESS)    Long term goal time span:  2-4 months    Plan:  Therapy options:  Physical therapy treatment to continue  Planned therapy interventions:  Addressing equipment needs, compression bandaging, compression stocking, decongestive exercises, home exercise program, intermittent compression, manual lymph drainage, Velcro wraps, referral for compression garment & instructions for don/doffing, patient education and range of motion exercises  Planned education:  Functional anatomy and physiology of the lymphatic system, pathophysiology of lymphedema, lymphedema exercise, proper skin care/nutrition, lymphedema precautions, compression bandaging, self massage, long term self-management of lymphedema, home pump use and activity guidelines  Frequency:  1x week  Duration in weeks:  12  Duration in visits:  12  Discussed with:  Patient

## 2025-06-18 ENCOUNTER — PHYSICAL THERAPY (OUTPATIENT)
Dept: PHYSICAL THERAPY | Facility: MEDICAL CENTER | Age: 60
End: 2025-06-18
Attending: NURSE PRACTITIONER
Payer: COMMERCIAL

## 2025-06-18 DIAGNOSIS — R60.0 BILATERAL LOWER EXTREMITY EDEMA: ICD-10-CM

## 2025-06-18 DIAGNOSIS — Z96.652 S/P TOTAL KNEE REPLACEMENT, LEFT: ICD-10-CM

## 2025-06-18 DIAGNOSIS — Z96.652 S/P TKR (TOTAL KNEE REPLACEMENT), LEFT: ICD-10-CM

## 2025-06-18 DIAGNOSIS — Z96.651 STATUS POST RIGHT KNEE REPLACEMENT: ICD-10-CM

## 2025-06-18 DIAGNOSIS — M25.662 KNEE STIFFNESS, LEFT: Primary | ICD-10-CM

## 2025-06-18 PROCEDURE — 97140 MANUAL THERAPY 1/> REGIONS: CPT | Mod: KX

## 2025-06-18 SDOH — ECONOMIC STABILITY: GENERAL: QUALITY OF LIFE: GOOD

## 2025-06-18 ASSESSMENT — ENCOUNTER SYMPTOMS
EXACERBATED BY: STANDING
PAIN SCALE AT LOWEST: 3
PAIN SCALE: 3
PAIN LOCATION: RIGHT KNEE STIFFNESS
PAIN SCALE AT HIGHEST: 6
ALLEVIATING FACTORS: NOTHING
EXACERBATED BY: WALKING
PAIN TIMING: ALL DAY

## 2025-06-18 NOTE — OP THERAPY DAILY TREATMENT
Outpatient Physical Therapy  LYMPHEDEMA THERAPY DAILY TREATMENT     [facility] Outpatient Physical Therapy  [address]  [place] [address]  Phone:  [phone]  Fax:  [phone]    Date: 2025    Patient: Antonella Frausto  YOB: 1965  MRN: [id number]     Time Calculation    Start time: 1402  Stop time: 1441 Time Calculation (min): 39 minutes         Chief Complaint: No chief complaint on file.    Visit #: 22    Subjective:   History of Present Illness:     Mechanism of injury:  Patient is having sharp pain into her right foot. She went to urgent care and was told that she had planar fasciitis. She was also told that some had a broken bone in that foot, but that it looked like it was more than a year old and healed   Quality of life:  Good  Headaches:  no headaches  Sleep disturbance:  Not disrupted  Pain:     Current pain rating:  3    At best pain rating:  3    At worst pain ratin    Location:  Right knee stiffness    Pain timing:  All day    Relieving factors:  Nothing    Aggravating factors:  Walking and standing    Progression:  Unchanged  Social Support:     Lives in:  One-story house    Lives with:  Spouse  Treatments:     None    Patient Goals:     Patient goals for therapy:  Decreased edema and decreased pain      Lymphedema Objective    Visit type  Lipedema and Lipolymphedema    Postural Observation  Seated posture is fair  Standing posture is fair  Correction of posture has no consistent effect      Skin Appearance    moderate edema, pitting edema,  .    Sensation      Left Lower Extremity    Light tough is Impaired    Right Lower Extremity    Light touch is Impaired    Sensation Comments  Hypersensitivity  Left Lower Extremity Circumferential Measurements  Waist: 107.1 cm  Hip: 139.4 cm  Scrotum: cm  Ground/Upper Thigh: 74.9 cm  Mid Thigh: 57.1 cm  Knee: 48.9 cm  Upper Calf: 58.4 cm  Mid Calf: 50.1 cm  Ankle: 40.7 cm  Heel to Foot: 25.5  cm  Total: 602.1 cm    Right Lower Extremity Circumferential Measurements  Waist: 107.1 cm  Hip: 139.4 cm  Scrotum: cm  Ground/Upper Thigh: 69.9 cm  Mid Thigh: 57.9 cm  Knee: 47.1 cm  Upper Calf: 56.2 cm  Mid Calf: 48.1 cm  Ankle: 38.2 cm  Heel to Foot: 24.1 cm  Total: 588 cm    Lymphedema Stage  Stage 2 Lymphedema    Other Notes  L ankle - 31.5 cm  L Calf 55.1 cm  Left knee 50.3 cm  Length -49.4 cm  Left lower thigh - 56.4 cm  Left upper thigh - 57.8 cm  R ankle - 30.7 cm  R Calf 53.2 cm  R  knee 51.9 cm  Length - 34.1 cm  Right lower thigh - 57.6 cm  Right upper thigh - 62.1 cm          Therapeutic Treatments and Modalities:     1. Manual Therapy (CPT 57884)    Therapeutic Treatment and Modalities Summary: Short neck sequence, abdominal sequence, establishment of inguinal to axillary anastamoses, decongestion of the left lower extremity from proximal to distal with an emphasis on medial to lateral drainage     The purpose of Manual Lymph Drainage (MLD) is to reduce lymph volume in the affected limb by increasing intake of lymphatic load into the lymphatic system, increasing the volume of transported lymph fluid, moving lymph fluid in superficial lymph vessels to collateral lymph collectors, anastomoses, or tissue channels, and increasing venous return. The goal of MLD is to re-route the lymph flow around blocked areas into more centrally located healthy lymph vessels, which drain into the venous system.       Time-based treatments/modalities:    Physical Therapy Timed Treatment Charges  Manual therapy minutes (CPT 66010): 39 minutes      Assessment and Plan:   Assessment details:  Showed patient standing gastroc and soleus stretch to address her right foot plantar fasciitis. She did stand stationary for several hours on Friday and Saturday prior to this onset on Monday and I do think this activity played a role in her pain as well as increased measurements. She will be diligent with her stretching as well as  aquatic exercise and will return to clinic for follow up in one month  Barriers to therapy:  Comorbidities  Prognosis: fair      Goals:   Short Term Goals:  1 - Patient will be able to tolerate wearing reduction kit to bilateral lower extremities for 12/24 hours (GOAL MET)  2 - Patient will have no less than a 2 cm reduction in limb volume bilaterally (GOAL MET)  3 - Patient will be independent in HEP targeted for lymphatic fluid reduction (GOAL MET)  NEW GOALS 2/19/25 and 5/14/25  1 - Patient will resume wearing of her in-elastic compression garments for 12/24 hours days per week to reduce lymphatic load  2 - Patient will resume use of her pneumatic compression pump for 5/7 days per week to reduce lymphatic load and promote lymphatic mobilization   3 - Patient will have no less than a 3 cm reduction in limb volume to facilitate lymphatic load clearance and mobility.     Short term goal time span:  4-6 weeks    Long Term Goals:  NEW GOALS  1 - Patient will be independent with maintenance phase management of lymphedema including: compression garments, skin care education, risk reduction strategies, manual lymphatic drainage, and therapeutic exercise (GOAL IN PROGRESS)  2 - Patient will have no less than a 5 cm reduction in limb volume bilaterally (GOAL IN PROGRESS)  3 - Patient will be able to ambulate 200 feet with the use of a SPC while wearing in elastic compression garmenst (GOAL IN PROGRESS)    Long term goal time span:  2-4 months    Plan:  Therapy options:  Physical therapy treatment to continue  Planned therapy interventions:  Addressing equipment needs, compression bandaging, compression stocking, decongestive exercises, home exercise program, intermittent compression, manual lymph drainage, Velcro wraps, referral for compression garment & instructions for don/doffing, patient education and range of motion exercises  Planned education:  Functional anatomy and physiology of the lymphatic system,  pathophysiology of lymphedema, lymphedema exercise, proper skin care/nutrition, lymphedema precautions, compression bandaging, self massage, long term self-management of lymphedema, home pump use and activity guidelines  Frequency:  1x week  Duration in weeks:  12  Duration in visits:  12  Discussed with:  Patient

## 2025-06-22 DIAGNOSIS — E89.0 POSTOPERATIVE HYPOTHYROIDISM: ICD-10-CM

## 2025-06-24 ENCOUNTER — APPOINTMENT (OUTPATIENT)
Dept: PHYSICAL THERAPY | Facility: MEDICAL CENTER | Age: 60
End: 2025-06-24
Payer: COMMERCIAL

## 2025-06-24 RX ORDER — LEVOTHYROXINE SODIUM 150 UG/1
150 TABLET ORAL
Qty: 90 TABLET | Refills: 0 | Status: SHIPPED | OUTPATIENT
Start: 2025-06-24

## 2025-06-25 NOTE — TELEPHONE ENCOUNTER
Received request via: Pharmacy    Was the patient seen in the last year in this department? Yes    Does the patient have an active prescription (recently filled or refills available) for medication(s) requested? No    [facility]     Does the patient have assisted Plus and need 100-day supply? (This applies to ALL medications) Patient does not have SCP

## 2025-07-09 ENCOUNTER — HOSPITAL ENCOUNTER (OUTPATIENT)
Dept: LAB | Facility: MEDICAL CENTER | Age: 60
End: 2025-07-09
Attending: NURSE PRACTITIONER
Payer: COMMERCIAL

## 2025-07-09 DIAGNOSIS — E89.0 POSTOPERATIVE HYPOTHYROIDISM: ICD-10-CM

## 2025-07-09 DIAGNOSIS — E66.01 MORBID OBESITY DUE TO EXCESS CALORIES (HCC): ICD-10-CM

## 2025-07-09 DIAGNOSIS — E78.5 DYSLIPIDEMIA: ICD-10-CM

## 2025-07-09 DIAGNOSIS — R73.03 PREDIABETES: ICD-10-CM

## 2025-07-09 DIAGNOSIS — E55.9 VITAMIN D DEFICIENCY: ICD-10-CM

## 2025-07-09 DIAGNOSIS — R60.0 BILATERAL LOWER EXTREMITY EDEMA: ICD-10-CM

## 2025-07-09 DIAGNOSIS — Z11.4 SCREENING FOR HIV WITHOUT PRESENCE OF RISK FACTORS: ICD-10-CM

## 2025-07-09 DIAGNOSIS — Z00.00 ROUTINE HEALTH MAINTENANCE: ICD-10-CM

## 2025-07-09 LAB
25(OH)D3 SERPL-MCNC: 66 NG/ML (ref 30–100)
ALBUMIN SERPL BCP-MCNC: 3.8 G/DL (ref 3.2–4.9)
ALBUMIN/GLOB SERPL: 1.3 G/DL
ALP SERPL-CCNC: 92 U/L (ref 30–99)
ALT SERPL-CCNC: 11 U/L (ref 2–50)
ANION GAP SERPL CALC-SCNC: 11 MMOL/L (ref 7–16)
AST SERPL-CCNC: 16 U/L (ref 12–45)
BASOPHILS # BLD AUTO: 0.7 % (ref 0–1.8)
BASOPHILS # BLD: 0.06 K/UL (ref 0–0.12)
BILIRUB SERPL-MCNC: 0.3 MG/DL (ref 0.1–1.5)
BUN SERPL-MCNC: 23 MG/DL (ref 8–22)
CALCIUM ALBUM COR SERPL-MCNC: 9.7 MG/DL (ref 8.5–10.5)
CALCIUM SERPL-MCNC: 9.5 MG/DL (ref 8.5–10.5)
CHLORIDE SERPL-SCNC: 105 MMOL/L (ref 96–112)
CHOLEST SERPL-MCNC: 147 MG/DL (ref 100–199)
CO2 SERPL-SCNC: 22 MMOL/L (ref 20–33)
CREAT SERPL-MCNC: 0.83 MG/DL (ref 0.5–1.4)
EOSINOPHIL # BLD AUTO: 0.38 K/UL (ref 0–0.51)
EOSINOPHIL NFR BLD: 4.3 % (ref 0–6.9)
ERYTHROCYTE [DISTWIDTH] IN BLOOD BY AUTOMATED COUNT: 47.6 FL (ref 35.9–50)
EST. AVERAGE GLUCOSE BLD GHB EST-MCNC: 105 MG/DL
FASTING STATUS PATIENT QL REPORTED: NORMAL
GFR SERPLBLD CREATININE-BSD FMLA CKD-EPI: 80 ML/MIN/1.73 M 2
GLOBULIN SER CALC-MCNC: 2.9 G/DL (ref 1.9–3.5)
GLUCOSE SERPL-MCNC: 76 MG/DL (ref 65–99)
HBA1C MFR BLD: 5.3 % (ref 4–5.6)
HCT VFR BLD AUTO: 40.7 % (ref 37–47)
HDLC SERPL-MCNC: 38 MG/DL
HGB BLD-MCNC: 12.6 G/DL (ref 12–16)
HIV 1+2 AB+HIV1 P24 AG SERPL QL IA: NORMAL
IMM GRANULOCYTES # BLD AUTO: 0.03 K/UL (ref 0–0.11)
IMM GRANULOCYTES NFR BLD AUTO: 0.3 % (ref 0–0.9)
LDLC SERPL CALC-MCNC: 92 MG/DL
LYMPHOCYTES # BLD AUTO: 2.43 K/UL (ref 1–4.8)
LYMPHOCYTES NFR BLD: 27.6 % (ref 22–41)
MCH RBC QN AUTO: 26.9 PG (ref 27–33)
MCHC RBC AUTO-ENTMCNC: 31 G/DL (ref 32.2–35.5)
MCV RBC AUTO: 87 FL (ref 81.4–97.8)
MONOCYTES # BLD AUTO: 0.65 K/UL (ref 0–0.85)
MONOCYTES NFR BLD AUTO: 7.4 % (ref 0–13.4)
NEUTROPHILS # BLD AUTO: 5.27 K/UL (ref 1.82–7.42)
NEUTROPHILS NFR BLD: 59.7 % (ref 44–72)
NRBC # BLD AUTO: 0 K/UL
NRBC BLD-RTO: 0 /100 WBC (ref 0–0.2)
PLATELET # BLD AUTO: 350 K/UL (ref 164–446)
PMV BLD AUTO: 9.9 FL (ref 9–12.9)
POTASSIUM SERPL-SCNC: 4.4 MMOL/L (ref 3.6–5.5)
PROT SERPL-MCNC: 6.7 G/DL (ref 6–8.2)
RBC # BLD AUTO: 4.68 M/UL (ref 4.2–5.4)
SODIUM SERPL-SCNC: 138 MMOL/L (ref 135–145)
T3FREE SERPL-MCNC: 2.63 PG/ML (ref 2–4.4)
T4 FREE SERPL-MCNC: 1.35 NG/DL (ref 0.93–1.7)
TRIGL SERPL-MCNC: 85 MG/DL (ref 0–149)
TSH SERPL-ACNC: 5.27 UIU/ML (ref 0.38–5.33)
WBC # BLD AUTO: 8.8 K/UL (ref 4.8–10.8)

## 2025-07-09 PROCEDURE — 84443 ASSAY THYROID STIM HORMONE: CPT

## 2025-07-09 PROCEDURE — 36415 COLL VENOUS BLD VENIPUNCTURE: CPT

## 2025-07-09 PROCEDURE — 80061 LIPID PANEL: CPT

## 2025-07-09 PROCEDURE — 85025 COMPLETE CBC W/AUTO DIFF WBC: CPT

## 2025-07-09 PROCEDURE — 83036 HEMOGLOBIN GLYCOSYLATED A1C: CPT

## 2025-07-09 PROCEDURE — 84481 FREE ASSAY (FT-3): CPT

## 2025-07-09 PROCEDURE — 82306 VITAMIN D 25 HYDROXY: CPT

## 2025-07-09 PROCEDURE — 84439 ASSAY OF FREE THYROXINE: CPT

## 2025-07-09 PROCEDURE — 80053 COMPREHEN METABOLIC PANEL: CPT

## 2025-07-09 PROCEDURE — 87389 HIV-1 AG W/HIV-1&-2 AB AG IA: CPT

## 2025-07-17 ENCOUNTER — OFFICE VISIT (OUTPATIENT)
Dept: MEDICAL GROUP | Facility: PHYSICIAN GROUP | Age: 60
End: 2025-07-17
Payer: COMMERCIAL

## 2025-07-17 VITALS
BODY MASS INDEX: 41.15 KG/M2 | DIASTOLIC BLOOD PRESSURE: 72 MMHG | HEIGHT: 64 IN | SYSTOLIC BLOOD PRESSURE: 110 MMHG | HEART RATE: 89 BPM | WEIGHT: 241 LBS | TEMPERATURE: 97.4 F

## 2025-07-17 DIAGNOSIS — Z00.00 ROUTINE HEALTH MAINTENANCE: ICD-10-CM

## 2025-07-17 DIAGNOSIS — Z12.31 ENCOUNTER FOR SCREENING MAMMOGRAM FOR BREAST CANCER: ICD-10-CM

## 2025-07-17 DIAGNOSIS — R60.9 LIPEDEMA: ICD-10-CM

## 2025-07-17 DIAGNOSIS — E89.0 POSTOPERATIVE HYPOTHYROIDISM: ICD-10-CM

## 2025-07-17 DIAGNOSIS — Z00.00 ENCOUNTER FOR WELL ADULT EXAM WITHOUT ABNORMAL FINDINGS: ICD-10-CM

## 2025-07-17 PROCEDURE — 3078F DIAST BP <80 MM HG: CPT | Performed by: NURSE PRACTITIONER

## 2025-07-17 PROCEDURE — 99396 PREV VISIT EST AGE 40-64: CPT | Performed by: NURSE PRACTITIONER

## 2025-07-17 PROCEDURE — 3074F SYST BP LT 130 MM HG: CPT | Performed by: NURSE PRACTITIONER

## 2025-07-17 RX ORDER — LEVOTHYROXINE SODIUM 175 UG/1
175 TABLET ORAL
Qty: 90 TABLET | Refills: 0 | Status: SHIPPED | OUTPATIENT
Start: 2025-07-17

## 2025-07-17 ASSESSMENT — FIBROSIS 4 INDEX: FIB4 SCORE: 0.84

## 2025-07-17 NOTE — PROGRESS NOTES
Subjective:   CC:   Chief Complaint   Patient presents with    Annual Exam    Lab Results     Verbal consent was acquired by the patient to use [employer] ambient listening note generation during this visit Yes    HPI:   Antonella Frausto is a 59 y.o. female who presents for annual exam:    History of Present Illness  The patient presents for a routine checkup.    She has been experiencing fatigue and constant coldness, even when the room temperature is set at 76 degrees. She has lost weight since last year and continues to exercise, including physical therapy, running, and using an exercise bike for 20 minutes twice daily. She also attends a fitness class twice a week. Her diet has improved, and she does not consume alcohol or use drugs. She feels safe in her relationship and uses sunscreen and sun protection outdoors. She always wears her seatbelt in the car. She is up to date with her dental check-ups, with the next one scheduled for 09/2025. She had an eye exam in 01/2025. She has not had any STD testing. She has not undergone hormone replacement therapy and is not experiencing any menopausal symptoms. She performs self-breast exams. She reports no ear pain or difficulty swallowing. She experiences constipation and diarrhea and tries to maintain adequate hydration. She has noticed some spotting, which she attributes to rubbing herself too hard. She reports no chest pain or shortness of breath. She has been taking tirzepatide 5.5 for a couple of months, which has helped with leg swelling but not with leg edema. She is unsure if it is compounded or contains B12. She has been applying castor oil on her scars.    She has not yet received her second shingles vaccine. She takes vitamin D 10,000 IU daily.    Her TSH level was high at 5.2, but her medication was not adjusted. She continues to take it every morning with a glass of water on an empty stomach. She takes her supplements at night before bed and drinks tea  about 30 minutes after taking her medication.    She has been attending a lymphedema clinic and has two visits remaining. She is requesting a referral for next year, as she was advised not to schedule it until 2025.    She had two total knee replacements. She had four pregnancies in total, with the last one resulting in a miscarriage at three months. She had surgery in 2025, during which her oxygen level dropped significantly. She was sent home with oxygen after the first surgery due to lack of available rooms, but was kept overnight after the second surgery. She still experiences stiffness upon standing, with the right knee being more problematic than the left. She has an appointment with dermatology in 2025.    Marital Status:   Diet: Improved diet  Alcohol: Does not consume alcohol  Tobacco: Never smoked  Recreational Drugs: Does not use drugs  Coffee/Tea/Caffeine-containing Drinks: Drinks tea    PAST SURGICAL HISTORY:  - Two total knee replacements  - Surgery in 2025    FAMILY HISTORY  Her sister and brother are still alive and doing okay.     Anticipatory Guidance:  Cholesterol screenin2025   LDL controlled: 92  Diabetes screenin2025   A1c controlled: 5.3%  Diet: Recommend more lean meats, fruits, vegetables, whole grains.   Exercise: Encourage regular exercise.   Substance abuse: No   Safe in relationship: Yes  Seatbelts, bike/motorcycle helmet: Yes  Sun protection: Yes   Dentist: Due for follow up  Eye doctor: Up to date     Cancer Screening:  Colorectal cancer screenin2023 negative Cologuard  Cervical cancer screenin2022  Breast cancer screenin2024     Infectious Disease Screening/Immunizations:  STI screening: Declines  Chlamydia/Gonorrhea screening: Declines  Hep C screening: Complete  HIV screen: Complete  Practices safe sex: Yes    Immunizations:   Influenza: Out of season   Tetanus: 2022   Hep B: Complete   Pneumonia: Declines  Shingrix:  2021 & recommend completing series   Received HPV series: Aged out    Preventative Care Screening:   Osteoporosis Screening: NA, due at age 63  Tobacco Screening: Never smoker  AAA Screening: N/A    Patient's last menstrual period was 2012 (within years).  She has not utilized hormone replacement therapy.  Denies any menopausal symptoms.  No significant bloating/fluid retention, pelvic pain, or dyspareunia. No abnormal vaginal discharge.   No breast tenderness, mass, nipple discharge or changes in size or contour.    OB History    Para Term  AB Living   4 3 3 0 1 3   SAB IAB Ectopic Molar Multiple Live Births   1 0 0 0 0 3     She  reports being sexually active and has had partner(s) who are male.  She  has a past medical history of Adverse effect of anesthesia (25), Arthritis, Bronchitis (12/15/2014), Diabetes (HCC), High cholesterol, Hypothyroidism, Increased BMI, Pain (2014), Postmenopausal bleeding (2022), Prediabetes (2020), Urinary incontinence, and Venous thrombosis (2018).  She  has a past surgical history that includes cholecystectomy (1995); gyn surgery; thyroidectomy total (10/20/2010); node biopsy (10/20/2010); knee arthroscopy (2014); meniscectomy, knee, medial (2014); chondroplasty (2014); hysteroscopy with myosure (2022); knee arthroplasty total (Right, 2025); pr total knee arthroplasty (Left, 2025); and orif, knee ([id number]).    Family History   Problem Relation Age of Onset    Cancer Mother         Thyroid ca, lymphoma    Thyroid Mother     Lymphoma Mother     Thyroid cancer Mother     Hypertension Mother     Heart Disease Father     Heart Failure Father     Glaucoma Father     No Known Problems Sister     Heart Failure Brother     Heart Attack Brother     Arthritis Brother         psoriatic    DVT Brother     Psoriasis Brother     Heart Disease Brother     Cancer Maternal Grandmother         Cervical  "Cancer    No Known Problems Maternal Grandfather     Dementia Paternal Grandmother     Parkinson's Disease Paternal Grandmother     Glaucoma Paternal Grandmother     Cancer Paternal Grandfather         Throat and liver    No Known Problems Son     No Known Problems Son     No Known Problems Son     Cervical Cancer Other      Social History[1]  Patient Active Problem List    Diagnosis Date Noted    S/P total knee arthroplasty, left 04/29/2025    Primary osteoarthritis of left knee 03/18/2025    Lipedema 12/03/2024    Osteoarthritis, knee 10/14/2024    Primary osteoarthritis of right knee 10/14/2024    Intrinsic eczema 07/22/2021    Shortness of breath 11/12/2019    Chronic allergic rhinitis 04/23/2018    Morbid obesity due to excess calories (HCC) 02/16/2018    Vitamin D deficiency 08/16/2017    Osteoarthritis of foot 07/10/2013    Postoperative hypothyroidism 09/14/2012    Dyslipidemia 09/14/2012     Current Medications[2]  Allergies[3]    Review of Systems   Constitutional: Negative for fever, chills and malaise/fatigue.   HENT: Negative for congestion.    Eyes: Negative for pain.   Respiratory: Negative for cough and shortness of breath.    Cardiovascular: Negative for chest pain and leg swelling.   Gastrointestinal: Negative for nausea, vomiting, abdominal pain and diarrhea.   Genitourinary: Negative for dysuria and hematuria.   Skin: Negative for rash.   Neurological: Negative for dizziness, focal weakness and headaches.   Endo/Heme/Allergies: Does not bruise/bleed easily.   Psychiatric/Behavioral: Negative for depression.  The patient is not nervous/anxious.      Objective:   /72 (BP Location: Right arm, Patient Position: Sitting, BP Cuff Size: Adult)   Pulse 89   Temp 36.3 °C (97.4 °F) (Temporal)   Ht 1.626 m (5' 4\")   Wt 109 kg (241 lb)   LMP 07/17/2012 (Within Years)   PF 99 L/min   BMI 41.37 kg/m²     Wt Readings from Last 4 Encounters:   07/17/25 109 kg (241 lb)   04/29/25 116 kg (255 lb 11.7 " oz)   04/15/25 116 kg (256 lb 9.9 oz)   02/11/25 104 kg (230 lb)     Physical Exam:  Constitutional: Well-developed and well-nourished. Not diaphoretic. No distress.   Skin: Skin is warm and dry. No rash noted.  Head: Atraumatic without lesions.  Eyes: Conjunctivae and extraocular motions are normal. Pupils are equal, round, and reactive to light. No scleral icterus.   Ears:  External ears unremarkable. Tympanic membranes clear and intact.  Nose: Nares patent. Septum midline. Turbinates without erythema nor edema. No discharge.   Mouth/Throat: Tongue normal. Oropharynx is clear and moist. Posterior pharynx without erythema or exudates.  Neck: Supple, trachea midline. Normal range of motion. No thyromegaly present. No lymphadenopathy--cervical or supraclavicular.  Cardiovascular: Regular rate and rhythm, S1 and S2 without murmur, rubs, or gallops.    Respiratory: Effort normal. Clear to auscultation throughout. No adventitious sounds.   Breast:  Breast exam deferred. Discussed monthly self exams and what to look for, including peau d'orange or nipple retraction, discharge, breasts moving freely and equally without restriction, axillary/supraclavicular adenopathy, or palpable masses/nodules.  Abdomen: Soft, non tender, and without distention. Active bowel sounds in all four quadrants. No rebound, guarding.  Extremities: No cyanosis, clubbing, erythema, nor edema. Radial pulses intact and symmetric.   Musculoskeletal: All major joints AROM full in all directions without pain.  Neurological: Alert and oriented x 3. Grossly non-focal. Strength and sensation grossly intact.   Psychiatric:  Behavior, mood, and affect are appropriate.    Assessment and Plan:   1. Encounter for well adult exam without abnormal findings  Due for pneumonia vaccine but declined today. Due for second shingles vaccine, to be obtained from pharmacy with records sent to clinic. Flu shot due in fall.    2. Postoperative hypothyroidism  Chronic,  ongoing.  Reports feeling tired and cold all the time, symptoms of hypothyroidism. Levothyroxine dose increased to 175 mcg for two months. T3, T4, and TSH levels to be rechecked after two months. Follow-up scheduled in two months to assess symptoms and lab results.  - levothyroxine (SYNTHROID) 175 MCG Tab; Take 1 Tablet by mouth every morning on an empty stomach.  Dispense: 90 Tablet; Refill: 0  - T3 FREE; Future  - FREE THYROXINE; Future  - TSH; Future    3. Lipedema  Chronic, ongoing.  Referral to lipedema clinic to be made in 11/2025. Patient has two visits left for current year and will need referral for next year.    4. Encounter for screening mammogram for breast cancer  Due for screening.  - MA-SCREENING MAMMO BILAT W/TOMOSYNTHESIS W/CAD; Future    5. Routine health maintenance  Flu shot due in fall, colon cancer screening in 07/2026, Pap smear in 09/2027, tetanus in 07/2032. Mammogram order placed for end of month or 08/2025.     Health maintenance: Up to date   Labs per orders  Immunizations: Declines   Patient counseled about skin care, diet, supplements, and exercise.  Discussed  breast self exam, mammography screening, menopause, osteoporosis, adequate intake of calcium and vitamin D, diet and exercise, colorectal cancer screening.     Follow-up: Return in about 3 months (around 10/17/2025) for Thyroid, Follow up Labs.     Please note that this dictation was created using voice recognition software. I have worked with consultants from the vendor as well as technical experts from [facility] [employer] to optimize the interface. I have made every reasonable attempt to correct obvious errors, but I expect that there are errors of grammar and possibly content that I did not discover before finalizing the note.        [1]   Social History  Tobacco Use    Smoking status: Never     Passive exposure: Never    Smokeless tobacco: Never   Vaping Use    Vaping status: Never Used   Substance Use Topics    Alcohol use: No     "Drug use: No   [2]   Current Outpatient Medications   Medication Sig Dispense Refill    TIRZEPATIDE SC Inject  under the skin.      levothyroxine (SYNTHROID) 175 MCG Tab Take 1 Tablet by mouth every morning on an empty stomach. 90 Tablet 0    gabapentin (NEURONTIN) 300 MG Cap TAKE 1 CAPSULE BY MOUTH EVERY EVENING FOR 14 DAYS 14 Capsule 0    ibuprofen (MOTRIN) 600 MG Tab Take 1 Tablet by mouth every 6 hours as needed for Moderate Pain. 30 Tablet 0    ondansetron (ZOFRAN ODT) 4 MG TABLET DISPERSIBLE Take 1 Tablet by mouth every 6 hours as needed for Nausea/Vomiting. 20 Tablet 1    mupirocin (BACTROBAN) 2 % Ointment Swab 2x daily in each nostril for 5 days 22 g 0    Ivermectin 1 % Cream APPLY TOPICALLY TO FACE EVERY DAY      fluticasone (FLONASE) 50 MCG/ACT nasal spray Administer 1 Spray into affected nostril(S) every day. 16 g 0    Oxymetazoline HCl (NASAL SPRAY 12 HOUR NA) Administer  into affected nostril(S) as needed.      Vitamin D-Vitamin K (VITAMIN K2-VITAMIN D3 PO) Take 10,000 Units by mouth every day.      Ascorbic Acid (VITAMIN C) 1000 MG Tab Take 0.5 Tablets by mouth every day.      Potassium 99 MG Tab Take  by mouth every day.      Zinc 100 MG Tab Take  by mouth every day.      Magnesium 200 MG Tab Take  by mouth every day.      triamcinolone acetonide (KENALOG) 0.5 % Cream Apply 1 g topically 2 times a day as needed (eczema). 15 g 6    cetirizine (ZYRTEC) 10 MG TABS Take 1 Tablet by mouth every day.       No current facility-administered medications for this visit.   [3]   Allergies  Allergen Reactions    Asa [Aspirin] Vomiting and Nausea    Codeine Vomiting and Nausea    Latex Rash     \"irritates my skin\"    Meloxicam Rash     Bruising     "

## 2025-07-24 ENCOUNTER — PHYSICAL THERAPY (OUTPATIENT)
Dept: PHYSICAL THERAPY | Facility: MEDICAL CENTER | Age: 60
End: 2025-07-24
Attending: NURSE PRACTITIONER
Payer: COMMERCIAL

## 2025-07-24 DIAGNOSIS — Z96.652 S/P TKR (TOTAL KNEE REPLACEMENT), LEFT: ICD-10-CM

## 2025-07-24 DIAGNOSIS — M25.662 KNEE STIFFNESS, LEFT: Primary | ICD-10-CM

## 2025-07-24 DIAGNOSIS — R60.0 BILATERAL LOWER EXTREMITY EDEMA: ICD-10-CM

## 2025-07-24 DIAGNOSIS — Z96.652 S/P TOTAL KNEE REPLACEMENT, LEFT: ICD-10-CM

## 2025-07-24 PROCEDURE — 97140 MANUAL THERAPY 1/> REGIONS: CPT

## 2025-07-24 SDOH — ECONOMIC STABILITY: GENERAL: QUALITY OF LIFE: GOOD

## 2025-07-24 ASSESSMENT — ENCOUNTER SYMPTOMS
PAIN LOCATION: RIGHT KNEE STIFFNESS
PAIN SCALE AT HIGHEST: 6
EXACERBATED BY: WALKING
EXACERBATED BY: STANDING
PAIN SCALE AT LOWEST: 3
PAIN TIMING: ALL DAY
ALLEVIATING FACTORS: NOTHING
PAIN SCALE: 3

## 2025-07-24 NOTE — OP THERAPY DAILY TREATMENT
Outpatient Physical Therapy  LYMPHEDEMA THERAPY DAILY TREATMENT     [facility]  [address]  [place] [address]  Phone:  [phone]  Fax:  [phone]    Date: 2025    Patient: Antonella Frausto  YOB: 1965  MRN: [id number]     Time Calculation    Start time: 1321  Stop time: 1400 Time Calculation (min): 39 minutes         Chief Complaint: Lymphedema Aquired    Visit #: [id number]    Subjective:   History of Present Illness:     Mechanism of injury:  Patients plantar fasciitis has improved and she is able to return to the pool. She has also lost 10lbs on GPL-1 inhibitor. Her pump is giving her an error message and she has reached out to [employer] to address  Quality of life:  Good  Headaches:  no headaches  Sleep disturbance:  Not disrupted  Pain:     Current pain rating:  3    At best pain rating:  3    At worst pain ratin    Location:  Right knee stiffness    Pain timing:  All day    Relieving factors:  Nothing    Aggravating factors:  Walking and standing    Progression:  Unchanged  Social Support:     Lives in:  One-story house    Lives with:  Spouse  Treatments:     None    Patient Goals:     Patient goals for therapy:  Decreased edema and decreased pain      Lymphedema Objective    Visit type  Lipedema and Lipolymphedema    Postural Observation  Seated posture is fair  Standing posture is fair  Correction of posture has no consistent effect      Skin Appearance    moderate edema, pitting edema,  .    Sensation      Left Lower Extremity    Light tough is Impaired    Right Lower Extremity    Light touch is Impaired    Sensation Comments  Hypersensitivity  Left Lower Extremity Circumferential Measurements  Waist: 107.1 cm  Hip: 139.4 cm  Scrotum: cm  Ground/Upper Thigh: 74.9 cm  Mid Thigh: 57.1 cm  Knee: 48.9 cm  Upper Calf: 58.4 cm  Mid Calf: 50.1 cm  Ankle: 40.7 cm  Heel to Foot: 25.5 cm  Total: 602.1 cm    Right Lower  Extremity Circumferential Measurements  Waist: 107.1 cm  Hip: 139.4 cm  Scrotum: cm  Ground/Upper Thigh: 69.9 cm  Mid Thigh: 57.9 cm  Knee: 47.1 cm  Upper Calf: 56.2 cm  Mid Calf: 48.1 cm  Ankle: 38.2 cm  Heel to Foot: 24.1 cm  Total: 588 cm    Lymphedema Stage  Stage 2 Lymphedema    Other Notes  L ankle - 31.5 cm  L Calf 55.1 cm  Left knee 50.3 cm  Length -49.4 cm  Left lower thigh - 56.4 cm  Left upper thigh - 57.8 cm  R ankle - 30.7 cm  R Calf 53.2 cm  R  knee 51.9 cm  Length - 34.1 cm  Right lower thigh - 57.6 cm  Right upper thigh - 62.1 cm          Therapeutic Treatments and Modalities:     1. Manual Therapy (CPT 89614)    Therapeutic Treatment and Modalities Summary: Short neck sequence, abdominal sequence, establishment of inguinal to axillary anastamoses, decongestion of the left lower extremity from proximal to distal with an emphasis on medial to lateral drainage     The purpose of Manual Lymph Drainage (MLD) is to reduce lymph volume in the affected limb by increasing intake of lymphatic load into the lymphatic system, increasing the volume of transported lymph fluid, moving lymph fluid in superficial lymph vessels to collateral lymph collectors, anastomoses, or tissue channels, and increasing venous return. The goal of MLD is to re-route the lymph flow around blocked areas into more centrally located healthy lymph vessels, which drain into the venous system.       Time-based treatments/modalities:    Physical Therapy Timed Treatment Charges  Manual therapy minutes (CPT 96368): 39 minutes      Assessment and Plan:   Assessment details:  Improved texture noted into the legs bilaterally, suspect that this is due in part to decreased inflammation from GPL-1. I would like ot see how much more weight she is able to drop as I am considering ordering her new custom compression claudia's. She will also reach out to me when they are able to identify the problem with her pump is assistance is needed.   Barriers to  therapy:  Comorbidities  Prognosis: fair      Goals:   Short Term Goals:  1 - Patient will be able to tolerate wearing reduction kit to bilateral lower extremities for 12/24 hours (GOAL MET)  2 - Patient will have no less than a 2 cm reduction in limb volume bilaterally (GOAL MET)  3 - Patient will be independent in HEP targeted for lymphatic fluid reduction (GOAL MET)  NEW GOALS 2/19/25 and 5/14/25  1 - Patient will resume wearing of her in-elastic compression garments for 12/24 hours days per week to reduce lymphatic load  2 - Patient will resume use of her pneumatic compression pump for 5/7 days per week to reduce lymphatic load and promote lymphatic mobilization   3 - Patient will have no less than a 3 cm reduction in limb volume to facilitate lymphatic load clearance and mobility.     Short term goal time span:  4-6 weeks    Long Term Goals:  NEW GOALS  1 - Patient will be independent with maintenance phase management of lymphedema including: compression garments, skin care education, risk reduction strategies, manual lymphatic drainage, and therapeutic exercise (GOAL IN PROGRESS)  2 - Patient will have no less than a 5 cm reduction in limb volume bilaterally (GOAL IN PROGRESS)  3 - Patient will be able to ambulate 200 feet with the use of a SPC while wearing in elastic compression garmenst (GOAL IN PROGRESS)    Long term goal time span:  2-4 months    Plan:  Therapy options:  Physical therapy treatment to continue  Planned therapy interventions:  Addressing equipment needs, compression bandaging, compression stocking, decongestive exercises, home exercise program, intermittent compression, manual lymph drainage, Velcro wraps, referral for compression garment & instructions for don/doffing, patient education and range of motion exercises  Planned education:  Functional anatomy and physiology of the lymphatic system, pathophysiology of lymphedema, lymphedema exercise, proper skin care/nutrition, lymphedema  precautions, compression bandaging, self massage, long term self-management of lymphedema, home pump use and activity guidelines  Frequency:  1x week  Duration in weeks:  12  Duration in visits:  12  Discussed with:  Patient

## 2025-08-04 ENCOUNTER — RX ONLY (RX ONLY)
Age: 60
End: 2025-08-04

## 2025-08-04 ENCOUNTER — APPOINTMENT (OUTPATIENT)
Dept: URBAN - METROPOLITAN AREA CLINIC 6 | Facility: CLINIC | Age: 60
Setting detail: DERMATOLOGY
End: 2025-08-04

## 2025-08-04 DIAGNOSIS — D22 MELANOCYTIC NEVI: ICD-10-CM

## 2025-08-04 DIAGNOSIS — L71.8 OTHER ROSACEA: ICD-10-CM | Status: WELL CONTROLLED

## 2025-08-04 DIAGNOSIS — L81.4 OTHER MELANIN HYPERPIGMENTATION: ICD-10-CM

## 2025-08-04 DIAGNOSIS — L72.0 EPIDERMAL CYST: ICD-10-CM

## 2025-08-04 DIAGNOSIS — L82.1 OTHER SEBORRHEIC KERATOSIS: ICD-10-CM

## 2025-08-04 DIAGNOSIS — Z71.89 OTHER SPECIFIED COUNSELING: ICD-10-CM

## 2025-08-04 DIAGNOSIS — D18.0 HEMANGIOMA: ICD-10-CM

## 2025-08-04 PROBLEM — D18.01 HEMANGIOMA OF SKIN AND SUBCUTANEOUS TISSUE: Status: ACTIVE | Noted: 2025-08-04

## 2025-08-04 PROBLEM — D23.62 OTHER BENIGN NEOPLASM OF SKIN OF LEFT UPPER LIMB, INCLUDING SHOULDER: Status: ACTIVE | Noted: 2025-08-04

## 2025-08-04 PROBLEM — D22.61 MELANOCYTIC NEVI OF RIGHT UPPER LIMB, INCLUDING SHOULDER: Status: ACTIVE | Noted: 2025-08-04

## 2025-08-04 PROBLEM — D22.5 MELANOCYTIC NEVI OF TRUNK: Status: ACTIVE | Noted: 2025-08-04

## 2025-08-04 PROCEDURE — ? DEFER

## 2025-08-04 PROCEDURE — ? COUNSELING

## 2025-08-04 PROCEDURE — ? SUNSCREEN RECOMMENDATIONS

## 2025-08-04 PROCEDURE — ? ADDITIONAL NOTES

## 2025-08-04 PROCEDURE — ? PRESCRIPTION MEDICATION MANAGEMENT

## 2025-08-04 RX ORDER — IVERMECTIN 10 MG/G
CREAM TOPICAL
Qty: 45 | Refills: 3 | Status: ERX

## 2025-08-04 ASSESSMENT — LOCATION SIMPLE DESCRIPTION DERM
LOCATION SIMPLE: UPPER BACK
LOCATION SIMPLE: LEFT CHEEK
LOCATION SIMPLE: RIGHT UPPER BACK
LOCATION SIMPLE: LEFT UPPER BACK
LOCATION SIMPLE: RIGHT CHEEK
LOCATION SIMPLE: RIGHT HAND
LOCATION SIMPLE: NOSE

## 2025-08-04 ASSESSMENT — LOCATION DETAILED DESCRIPTION DERM
LOCATION DETAILED: NASAL DORSUM
LOCATION DETAILED: RIGHT INFERIOR MEDIAL MALAR CHEEK
LOCATION DETAILED: RIGHT THENAR EMINENCE
LOCATION DETAILED: RIGHT SUPERIOR MEDIAL UPPER BACK
LOCATION DETAILED: LEFT CENTRAL MALAR CHEEK
LOCATION DETAILED: RIGHT SUPERIOR UPPER BACK
LOCATION DETAILED: INFERIOR THORACIC SPINE
LOCATION DETAILED: LEFT MEDIAL UPPER BACK

## 2025-08-04 ASSESSMENT — LOCATION ZONE DERM
LOCATION ZONE: TRUNK
LOCATION ZONE: FACE
LOCATION ZONE: HAND
LOCATION ZONE: NOSE

## (undated) DEVICE — SODIUM CHL. IRRIGATION 0.9% 3000ML (4EA/CA 65CA/PF)

## (undated) DEVICE — TUBING CLEARLINK DUO-VENT - C-FLO (48EA/CA)

## (undated) DEVICE — TIP INTPLS HFLO ML ORFC BTRY - (12/CS) FOR SURGILAV

## (undated) DEVICE — SET EXTENSION WITH 2 PORTS (48EA/CA) ***PART #2C8610 IS A SUBSTITUTE*****

## (undated) DEVICE — BLADE 90X18X1.27MM SAW SAGITTAL

## (undated) DEVICE — SUTURE 2-0 MONOCRYL PLUS UNDYED CT-1 1 X 36 (36EA/BX)"

## (undated) DEVICE — BLADE SAGITTAL 34MM

## (undated) DEVICE — SENSOR OXIMETER ADULT SPO2 RD SET (20EA/BX)

## (undated) DEVICE — GLOVE BIOGEL SZ 8 SURGICAL PF LTX - (50PR/BX 4BX/CA)

## (undated) DEVICE — GLOVE BIOGEL PI ORTHO SZ 7.5 PF LF (40PR/BX)

## (undated) DEVICE — GLOVE BIOGEL SZ 7 SURGICAL PF LTX - (50PR/BX 4BX/CA)

## (undated) DEVICE — PAD SANITARY 11IN MAXI IND WRAPPED  (12EA/PK 24PK/CA)

## (undated) DEVICE — DRESSING AQACEL ADVANTAGE ANTIMICROBIAL WITH HYDROFIBER .075IN X 18IN (5EA/BX)

## (undated) DEVICE — GLOVE BIOGEL PI INDICATOR SZ 8.0 SURGICAL PF LF -(50/BX 4BX/CA)

## (undated) DEVICE — CANISTER SUCTION RIGID RED 1500CC (40EA/CA)

## (undated) DEVICE — LACTATED RINGERS INJ 1000 ML - (14EA/CA 60CA/PF)

## (undated) DEVICE — TOWEL STOP TIMEOUT SAFETY FLAG (40EA/CA)

## (undated) DEVICE — TUBE CONNECTING SUCTION - CLEAR PLASTIC STERILE 72 IN (50EA/CA)

## (undated) DEVICE — SUCTION INSTRUMENT YANKAUER BULBOUS TIP W/O VENT (50EA/CA)

## (undated) DEVICE — DRAPE LARGE 3 QUARTER - (20/CA)

## (undated) DEVICE — Device

## (undated) DEVICE — SODIUM CHL IRRIGATION 0.9% 1000ML (12EA/CA)

## (undated) DEVICE — HANDPIECE 10FT INTPLS SCT PLS IRRIGATION HAND CONTROL SET (6/PK)

## (undated) DEVICE — MASK OXYGEN VNYL ADLT MED CONC WITH 7 FOOT TUBING  - (50EA/CA)

## (undated) DEVICE — KIT  I.V. START (100EA/CA)

## (undated) DEVICE — NEEDLE SPINAL NON-SAFETY 22 GA X 3 (25EA/BX)"

## (undated) DEVICE — SET TUBING AQUILEX OUT-FLOW MYOSURE (10EA/BX)

## (undated) DEVICE — HUMID-VENT HEAT AND MOISTURE EXCHANGE- (50/BX)

## (undated) DEVICE — SUTURE 5 ETHIBOND V-37 (12PK/BX)

## (undated) DEVICE — CANNULA W/ SUPPLY TUBING O2 - (50/CA)

## (undated) DEVICE — PAD PREP 24 X 48 CUFFED - (100/CA)

## (undated) DEVICE — SPECIMEN PLASTIC CONVERTOR - (10/CA)

## (undated) DEVICE — SUTURE GENERAL

## (undated) DEVICE — SUTURE 3-0 MONOCRYL PLUS PS-1 - 27 INCH (36/BX)

## (undated) DEVICE — CANISTER SUCTION 3000ML MECHANICAL FILTER AUTO SHUTOFF MEDI-VAC NONSTERILE LF DISP  (40EA/CA)

## (undated) DEVICE — DERMABOND ADVANCED - (12EA/BX)

## (undated) DEVICE — COVER MAYO STAND X-LG - (22EA/CA)

## (undated) DEVICE — STOCKINETTE IMPERVIOUS 12X48 - STERILELF (10/CA)"

## (undated) DEVICE — BLADE SAGITTAL SAW DUAL CUT 25.0 X 90.0 X 1.27MM (1/EA)

## (undated) DEVICE — SET LEADWIRE 5 LEAD BEDSIDE DISPOSABLE ECG (1SET OF 5/EA)

## (undated) DEVICE — SLEEVE VASO CALF MED - (10PR/CA)

## (undated) DEVICE — GOWN WARMING STANDARD FLEX - (30/CA)

## (undated) DEVICE — DEVICE TISSUE REMOVAL LITE MYOSURE

## (undated) DEVICE — PACK TOTAL KNEE (1/CA)

## (undated) DEVICE — SET TUBING AQUILEX IN-FLOW MYOSURE (10EA/BX)

## (undated) DEVICE — GLOVE BIOGEL SZ 6.5 SURGICAL PF LTX (50PR/BX 4BX/CA)

## (undated) DEVICE — WATER IRRIGATION STERILE 1000ML (12EA/CA)